# Patient Record
Sex: FEMALE | Race: WHITE | HISPANIC OR LATINO | Employment: FULL TIME | URBAN - METROPOLITAN AREA
[De-identification: names, ages, dates, MRNs, and addresses within clinical notes are randomized per-mention and may not be internally consistent; named-entity substitution may affect disease eponyms.]

---

## 2017-01-16 ENCOUNTER — TELEPHONE (OUTPATIENT)
Dept: MEDICAL GROUP | Facility: PHYSICIAN GROUP | Age: 27
End: 2017-01-16

## 2017-01-16 NOTE — TELEPHONE ENCOUNTER
----- Message from DEVANTE Vance sent at 1/15/2017  7:24 AM PST -----  Negative lab results.  F/u with Rosie as discussed.  Thank you

## 2017-02-09 ENCOUNTER — OFFICE VISIT (OUTPATIENT)
Dept: MEDICAL GROUP | Facility: PHYSICIAN GROUP | Age: 27
End: 2017-02-09
Payer: COMMERCIAL

## 2017-02-09 VITALS
HEART RATE: 96 BPM | DIASTOLIC BLOOD PRESSURE: 78 MMHG | BODY MASS INDEX: 29.27 KG/M2 | OXYGEN SATURATION: 97 % | RESPIRATION RATE: 14 BRPM | WEIGHT: 155 LBS | TEMPERATURE: 99.5 F | SYSTOLIC BLOOD PRESSURE: 114 MMHG | HEIGHT: 61 IN

## 2017-02-09 DIAGNOSIS — J01.10 ACUTE NON-RECURRENT FRONTAL SINUSITIS: ICD-10-CM

## 2017-02-09 PROCEDURE — 99214 OFFICE O/P EST MOD 30 MIN: CPT | Performed by: FAMILY MEDICINE

## 2017-02-09 RX ORDER — AMOXICILLIN AND CLAVULANATE POTASSIUM 875; 125 MG/1; MG/1
1 TABLET, FILM COATED ORAL 2 TIMES DAILY
Qty: 28 TAB | Refills: 0 | Status: SHIPPED | OUTPATIENT
Start: 2017-02-09 | End: 2017-02-23

## 2017-02-09 NOTE — MR AVS SNAPSHOT
"        Livier Nickerson   2017 1:00 PM   Office Visit   MRN: [id number]    Department:  [facility]   Dept Phone:  [phone]    Description:  Female : 1990   Provider:  Leatha Syed M.D.           Reason for Visit     Headache           Allergies as of 2017     Allergen Noted Reactions    Iodine 2015   Hives, Rash         Morphine 2015   Rash    \"Skin burny rash.\"      You were diagnosed with     Acute non-recurrent frontal sinusitis   [[id number]]         Vital Signs     Blood Pressure Pulse Temperature Respirations Height Weight    114/78 mmHg 96 37.5 °C (99.5 °F) 14 1.549 m (5' 0.98\") 70.308 kg (155 lb)    Body Mass Index Oxygen Saturation Last Menstrual Period Smoking Status          29.30 kg/m2 97% 2017 Never Smoker         Basic Information     Date Of Birth Sex Race Ethnicity Preferred Language    1990 Female White Non- English      Problem List              ICD-10-CM Priority Class Noted - Resolved    Palpitations R00.2   Unknown - Present    HYPOTENSION    Unknown - Present    Abdominal mass R19.00   9/10/2013 - Present    ASTHMA    9/10/2013 - Present    Colon polyps K63.5   9/10/2013 - Present    Headache R51 High  2015 - Present    Behcet's disease (CMS-[facility]) M35.2   2015 - Present    Bacteremia R78.81 Medium  2015 - Present    Hypokalemia E87.6   2015 - Present    Diarrhea R19.7   2015 - Present    Vaginitis N76.0   2016 - Present    Encounter for female birth control Z30.019   2016 - Present    Screening for STDs (sexually transmitted diseases) Z11.3   2016 - Present      Health Maintenance        Date Due Completion Dates    IMM HEP B VACCINE (1 of 3 - Primary Series) 1990 ---    IMM HEP A VACCINE (1 of 2 - Standard Series) 1993 ---    IMM HPV VACCINE (1 of 3 - Female 3 Dose Series) 2003 ---    IMM VARICELLA (CHICKENPOX) VACCINE (1 of 2 - 2 Dose Adolescent Series) 2005 ---   " IMM DTaP/Tdap/Td Vaccine (1 - Tdap) 2/19/2011 ---    IMM INFLUENZA (1) 9/1/2016 1/30/2015, 10/31/2013    PAP SMEAR 7/31/2018 7/31/2015, 6/13/2014, 9/10/2009 (Prv Comp)    Override on 9/10/2009: Previously completed (normal)    COLONOSCOPY 2/16/2025 2/16/2015            Current Immunizations     Influenza TIV (IM) 10/31/2013    Influenza Vaccine Quad Inj (Pf) 1/30/2015 10:32 PM    Pneumococcal polysaccharide vaccine (PPSV-23) 1/30/2015 10:35 PM      Below and/or attached are the medications your provider expects you to take. Review all of your home medications and newly ordered medications with your provider and/or pharmacist. Follow medication instructions as directed by your provider and/or pharmacist. Please keep your medication list with you and share with your provider. Update the information when medications are discontinued, doses are changed, or new medications (including over-the-counter products) are added; and carry medication information at all times in the event of emergency situations     Allergies:  IODINE - Hives,Rash     MORPHINE - Rash               Medications  Valid as of: February 09, 2017 -  1:34 PM    Generic Name Brand Name Tablet Size Instructions for use    Acetaminophen (Tab) TYLENOL 500 MG Take 500-1,000 mg by mouth every 6 hours as needed for Moderate Pain.        Amoxicillin-Pot Clavulanate (Tab) AUGMENTIN 875-125 MG Take 1 Tab by mouth 2 times a day for 14 days.        Aspirin-Acetaminophen-Caffeine (Tab) EXCEDRIN 250-250-65 MG Take 1 Tab by mouth every 6 hours as needed for Headache.        Butoconazole Nitrate (1 Dose) (Cream) Butoconazole Nitrate (1 Dose) 2 % One applicator vaginally every bedtime x 6 days        Doxycycline Hyclate (Tab) VIBRAMYCIN 100 MG Take 1 Tab by mouth 2 times a day.        Escitalopram Oxalate (Tab) LEXAPRO 10 MG Take 1 Tab by mouth every day.        Fluconazole (Tab) DIFLUCAN 150 MG Take 1 tablet by mouth once.  Repeat in 72 hours.        Fluconazole (Tab)  DIFLUCAN 150 MG Take 1 Tab by mouth every day.        Fluconazole (Tab) DIFLUCAN 150 MG 1 tab po as a single dose prn yeast infection.        Fluconazole (Tab) DIFLUCAN 150 MG Take 1 Tab by mouth 1 time daily as needed (; may repeat 2nd dose in 3-5d if sx persist).        Fluconazole (Tab) DIFLUCAN 150 MG Take 1 Tab by mouth every day. May repeat in 2 days        Fluconazole (Tab) DIFLUCAN 150 MG Take 1 Tab by mouth every day. Take one pill by mouth.  Repeat in 72 hours, then monthly for 6 months.        Ibuprofen (Tab) MOTRIN 800 MG Take 800 mg by mouth every 8 hours as needed for Moderate Pain or Headache.        Naproxen Sodium   Take  by mouth.        Norethin Ace-Eth Estrad-FE (Tab) Norethin Ace-Eth Estrad-FE 1-20 MG-MCG(24) Take 1 Each by mouth every day.        Norethin Ace-Eth Estrad-FE (Tab) Norethin Ace-Eth Estrad-FE 1-20 MG-MCG(24) Take 1 tablet by mouth every day.        Norethindrone (Tab) MICRONOR 0.35 MG Take 1 Tab by mouth every day. Begin on day 1 of menses.        Ondansetron (TABLET DISPERSIBLE) ZOFRAN ODT 4 MG Take 1 Tab by mouth every 8 hours as needed for Nausea/Vomiting.        Oseltamivir Phosphate (Cap) TAMIFLU 75 MG Take 1 Cap by mouth 2 times a day.        Terconazole (Cream) TERAZOL 0.4 % Insert 1 Applicator in vagina every bedtime.        .                 Medicines prescribed today were sent to:     [id number] - [place] - [address]    [address] [place] [address]    Phone: [phone] Fax: [phone]    Open 24 Hours?: No      Medication refill instructions:       If your prescription bottle indicates you have medication refills left, it is not necessary to call your provider’s office. Please contact your pharmacy and they will refill your medication.    If your prescription bottle indicates you do not have any refills left, you may request refills at any time through one of the following ways: The online [employer] system (except Urgent Care), by calling your  provider’s office, or by asking your pharmacy to contact your provider’s office with a refill request. Medication refills are processed only during regular business hours and may not be available until the next business day. Your provider may request additional information or to have a follow-up visit with you prior to refilling your medication.   *Please Note: Medication refills are assigned a new Rx number when refilled electronically. Your pharmacy may indicate that no refills were authorized even though a new prescription for the same medication is available at the pharmacy. Please request the medicine by name with the pharmacy before contacting your provider for a refill.           Digital Message Display Access Code: [email or web address]  Expires: 2/11/2017 11:49 AM    Digital Message Display  A secure, online tool to manage your health information     [employer]’s Digital Message Display® is a secure, online tool that connects you to your personalized health information from the privacy of your home -- day or night - making it very easy for you to manage your healthcare. Once the activation process is completed, you can even access your medical information using the Digital Message Display delta, which is available for free in the Apple Delta store or Google Play store.     Digital Message Display provides the following levels of access (as shown below):   My Chart Features   [facility] Doctor [facility]  Specialists [facility]  Urgent  Care Non-[facility]  Primary Care  Doctor   Email your healthcare team securely and privately 24/7 X X X    Manage appointments: schedule your next appointment; view details of past/upcoming appointments X      Request prescription refills. X      View recent personal medical records, including lab and immunizations X X X X   View health record, including health history, allergies, medications X X X X   Read reports about your outpatient visits, procedures, consult and ER notes X X X X   See your discharge summary, which is a recap of your hospital and/or ER  visit that includes your diagnosis, lab results, and care plan. X X       How to register for [employer]:  1. Go to  [email or web address].  2. Click on the Sign Up Now box, which takes you to the New Member Sign Up page. You will need to provide the following information:  a. Enter your [employer] Access Code exactly as it appears at the top of this page. (You will not need to use this code after you’ve completed the sign-up process. If you do not sign up before the expiration date, you must request a new code.)   b. Enter your date of birth.   c. Enter your home email address.   d. Click Submit, and follow the next screen’s instructions.  3. Create a [employer] ID. This will be your [employer] login ID and cannot be changed, so think of one that is secure and easy to remember.  4. Create a [employer] password. You can change your password at any time.  5. Enter your Password Reset Question and Answer. This can be used at a later time if you forget your password.   6. Enter your e-mail address. This allows you to receive e-mail notifications when new information is available in [employer].  7. Click Sign Up. You can now view your health information.    For assistance activating your [employer] account, call [phone]

## 2017-02-09 NOTE — PROGRESS NOTES
"Chief Complaint   Patient presents with   • Headache       HISTORY OF PRESENT ILLNESS: Patient is a 26 y.o. female established patient here today for the following concerns:    Here today with a couple months of headaches that she awakens with worsening throughout the day.  This seems to progress to \"migraine-like\" pain.  Also noted that she was getting some diplopia in the left eye.  New exposures include restarting OCPs.  Associated with nausea/no vomiting.  No fevers.  Has had chills.  Taking some ibuprofen with some mild relief.      Past Medical, Social, and Family history reviewed and updated in EPIC    Allergies:Iodine and Morphine    Current Outpatient Prescriptions   Medication Sig Dispense Refill   • ondansetron (ZOFRAN ODT) 4 MG TABLET DISPERSIBLE Take 1 Tab by mouth every 8 hours as needed for Nausea/Vomiting. 10 Tab 0   • Norethin Ace-Eth Estrad-FE (LOESTRIN 24 FE) 1-20 MG-MCG(24) Tab Take 1 tablet by mouth every day. 28 Tab 11   • terconazole (TERAZOL) 0.4 % Cream Insert 1 Applicator in vagina every bedtime. 100 g 2   • Naproxen Sodium (ALEVE PO) Take  by mouth.     • acetaminophen (TYLENOL) 500 MG TABS Take 500-1,000 mg by mouth every 6 hours as needed for Moderate Pain.     • ibuprofen (MOTRIN) 800 MG TABS Take 800 mg by mouth every 8 hours as needed for Moderate Pain or Headache.     • asa/apap/caffeine (EXCEDRIN) 250-250-65 MG TABS Take 1 Tab by mouth every 6 hours as needed for Headache.     • oseltamivir (TAMIFLU) 75 MG Cap Take 1 Cap by mouth 2 times a day. 10 Cap 0   • Butoconazole Nitrate, 1 Dose, 2 % Cream One applicator vaginally every bedtime x 6 days 5 g 1   • fluconazole (DIFLUCAN) 150 MG tablet Take 1 Tab by mouth every day. Take one pill by mouth.  Repeat in 72 hours, then monthly for 6 months. 7 Tab 0   • norethindrone (MICRONOR) 0.35 MG tablet Take 1 Tab by mouth every day. Begin on day 1 of menses. 28 Tab 11   • fluconazole (DIFLUCAN) 150 MG tablet Take 1 Tab by mouth every day. May " "repeat in 2 days 2 Tab 0   • doxycycline (VIBRAMYCIN) 100 MG Tab Take 1 Tab by mouth 2 times a day. 20 Tab 0   • fluconazole (DIFLUCAN) 150 MG tablet Take 1 Tab by mouth 1 time daily as needed (; may repeat 2nd dose in 3-5d if sx persist). 2 Tab 2   • escitalopram (LEXAPRO) 10 MG Tab Take 1 Tab by mouth every day. 30 Tab 3   • fluconazole (DIFLUCAN) 150 MG tablet 1 tab po as a single dose prn yeast infection. 1 Tab 1   • fluconazole (DIFLUCAN) 150 MG tablet Take 1 tablet by mouth once.  Repeat in 72 hours. 2 Tab 0   • fluconazole (DIFLUCAN) 150 MG tablet Take 1 Tab by mouth every day. 1 Tab 0   • Norethin Ace-Eth Estrad-FE 1-20 MG-MCG(24) TABS Take 1 Each by mouth every day. 84 Tab 3     No current facility-administered medications for this visit.         ROS:  Review of Systems   Constitutional: Negative for fever, chills, weight loss and malaise/fatigue.   HENT: Negative for ear pain, nosebleeds, congestion, sore throat and neck pain.    Eyes: Negative for blurred vision.   Respiratory: Negative for cough, sputum production, shortness of breath and wheezing.    Cardiovascular: Negative for chest pain, palpitations,  and leg swelling.   Gastrointestinal: Negative for heartburn, nausea, vomiting, diarrhea and abdominal pain.   Genitourinary: Negative for dysuria, urgency and frequency.   Musculoskeletal: Negative for myalgias, back pain and joint pain.   Skin: Negative for rash and itching.   Neurological: Negative for dizziness, tingling, tremors, sensory change, focal weakness and headaches.   Endo/Heme/Allergies: Does not bruise/bleed easily.   Psychiatric/Behavioral: Negative for depression, anxiety, suicidal ideas, insomnia and memory loss.      Exam:  Blood pressure 114/78, pulse 96, temperature 37.5 °C (99.5 °F), resp. rate 14, height 1.549 m (5' 0.98\"), weight 70.308 kg (155 lb), last menstrual period 02/03/2017, SpO2 97 %.    General:  Well nourished, well developed in NAD  Head is grossly normal.  HEENT: " TM clear bilaterally.  Significant PND. Nasal mucosa edematous with purulent nasal discharge.  Sinus tenderness on palpation.   Neck: Supple without JVD   Pulmonary:  Normal effort. CTAB  Cardiovascular: Regular rate and rhythm   Extremities: no clubbing, cyanosis, or edema.  Psych: affect appropriate      Please note that this dictation was created using voice recognition software. I have made every reasonable attempt to correct obvious errors, but I expect that there are errors of grammar and possibly content that I did not discover before finalizing the note.    Assessment/Plan:  1. Acute non-recurrent frontal sinusitis  Suspect sinusitis triggering migraines, likely with rebound headaches from NSAID use.  Will treat with augmentin x 14 days, if still persists may consider triptan therapy or d/c OCPs.   - amoxicillin-clavulanate (AUGMENTIN) 875-125 MG Tab; Take 1 Tab by mouth 2 times a day for 14 days.  Dispense: 28 Tab; Refill: 0

## 2017-03-21 ENCOUNTER — OFFICE VISIT (OUTPATIENT)
Dept: MEDICAL GROUP | Facility: PHYSICIAN GROUP | Age: 27
End: 2017-03-21
Payer: COMMERCIAL

## 2017-03-21 VITALS
WEIGHT: 156 LBS | DIASTOLIC BLOOD PRESSURE: 70 MMHG | RESPIRATION RATE: 16 BRPM | HEIGHT: 61 IN | HEART RATE: 96 BPM | SYSTOLIC BLOOD PRESSURE: 104 MMHG | TEMPERATURE: 99.4 F | BODY MASS INDEX: 29.45 KG/M2 | OXYGEN SATURATION: 98 %

## 2017-03-21 DIAGNOSIS — Z11.3 SCREENING FOR STDS (SEXUALLY TRANSMITTED DISEASES): ICD-10-CM

## 2017-03-21 DIAGNOSIS — R53.83 OTHER FATIGUE: ICD-10-CM

## 2017-03-21 DIAGNOSIS — Z11.3 ROUTINE SCREENING FOR STI (SEXUALLY TRANSMITTED INFECTION): ICD-10-CM

## 2017-03-21 PROCEDURE — 99214 OFFICE O/P EST MOD 30 MIN: CPT | Performed by: NURSE PRACTITIONER

## 2017-03-21 NOTE — ASSESSMENT & PLAN NOTE
Found out that her boyfriend had been sleeping with other girls.  Had STI's checked in January, but it had only been about couple weeks since exposure.  It has now been 3 months.  She has had a lot of anxiety about whether or not she waited long enough to test.  She has recently been feeling ill frequently, tired, and gaining weight.  Discussed plan to recheck labs for verification of negative results now that time has elapsed.

## 2017-03-21 NOTE — MR AVS SNAPSHOT
"        Livier Nickerson   3/21/2017 1:40 PM   Office Visit   MRN: [id number]    Department:  [facility]   Dept Phone:  [phone]    Description:  Female : 1990   Provider:  DEVANTE Vance           Reason for Visit     Nail Problem yellowish color     Exposure to STD           Allergies as of 3/21/2017     Allergen Noted Reactions    Iodine 2015   Hives, Rash         Morphine 2015   Rash    \"Skin burny rash.\"      You were diagnosed with     Other fatigue   [9733169]       Routine screening for STI (sexually transmitted infection)   [786944]         Vital Signs     Blood Pressure Pulse Temperature Respirations Height Weight    104/70 mmHg 96 37.4 °C (99.4 °F) 16 1.549 m (5' 0.98\") 70.761 kg (156 lb)    Body Mass Index Oxygen Saturation Last Menstrual Period Smoking Status          29.49 kg/m2 98% 2017 Never Smoker         Basic Information     Date Of Birth Sex Race Ethnicity Preferred Language    1990 Female White Non- English      Your appointments     Mar 24, 2017  9:30 AM   MR HEAD 60 with [facility]   [facility]    [address]  [place] [address]   [phone]            Mar 24, 2017 10:00 AM   MR ORBITS WITH/WITHOUT (60) with [facility] 1   [facility]    [address]  [place] [address]   [phone]              Problem List              ICD-10-CM Priority Class Noted - Resolved    Palpitations R00.2   Unknown - Present    HYPOTENSION    Unknown - Present    Abdominal mass R19.00   9/10/2013 - Present    ASTHMA    9/10/2013 - Present    Colon polyps K63.5   9/10/2013 - Present    Headache R51 High  2015 - Present    Behcet's disease (CMS-HCC) M35.2   2015 - Present    Bacteremia R78.81 Medium  2015 - Present    Hypokalemia E87.6   2015 - Present    Diarrhea R19.7   2015 - Present    Vaginitis N76.0   2016 - Present    Encounter for female birth control Z30.019   2016 " - Present    Screening for STDs (sexually transmitted diseases) Z11.3   12/11/2016 - Present      Health Maintenance        Date Due Completion Dates    IMM HEP B VACCINE (1 of 3 - Primary Series) 1990 ---    IMM HEP A VACCINE (1 of 2 - Standard Series) 2/19/1993 ---    IMM HPV VACCINE (1 of 3 - Female 3 Dose Series) 2/19/2003 ---    IMM VARICELLA (CHICKENPOX) VACCINE (1 of 2 - 2 Dose Adolescent Series) 2/19/2005 ---    IMM DTaP/Tdap/Td Vaccine (1 - Tdap) 2/19/2011 ---    IMM INFLUENZA (1) 9/1/2016 1/30/2015, 10/31/2013    PAP SMEAR 7/31/2018 7/31/2015, 6/13/2014, 9/10/2009 (Prv Comp)    Override on 9/10/2009: Previously completed (normal)    COLONOSCOPY 2/16/2025 2/16/2015            Current Immunizations     Influenza TIV (IM) 10/31/2013    Influenza Vaccine Quad Inj (Pf) 1/30/2015 10:32 PM    Pneumococcal polysaccharide vaccine (PPSV-23) 1/30/2015 10:35 PM      Below and/or attached are the medications your provider expects you to take. Review all of your home medications and newly ordered medications with your provider and/or pharmacist. Follow medication instructions as directed by your provider and/or pharmacist. Please keep your medication list with you and share with your provider. Update the information when medications are discontinued, doses are changed, or new medications (including over-the-counter products) are added; and carry medication information at all times in the event of emergency situations     Allergies:  IODINE - Hives,Rash     MORPHINE - Rash               Medications  Valid as of: March 21, 2017 -  2:36 PM    Generic Name Brand Name Tablet Size Instructions for use    Acetaminophen (Tab) TYLENOL 500 MG Take 500-1,000 mg by mouth every 6 hours as needed for Moderate Pain.        Aspirin-Acetaminophen-Caffeine (Tab) EXCEDRIN 250-250-65 MG Take 1 Tab by mouth every 6 hours as needed for Headache.        Butoconazole Nitrate (1 Dose) (Cream) Butoconazole Nitrate (1 Dose) 2 % One  applicator vaginally every bedtime x 6 days        Doxycycline Hyclate (Tab) VIBRAMYCIN 100 MG Take 1 Tab by mouth 2 times a day.        Escitalopram Oxalate (Tab) LEXAPRO 10 MG Take 1 Tab by mouth every day.        Fluconazole (Tab) DIFLUCAN 150 MG Take 1 tablet by mouth once.  Repeat in 72 hours.        Fluconazole (Tab) DIFLUCAN 150 MG Take 1 Tab by mouth every day.        Fluconazole (Tab) DIFLUCAN 150 MG 1 tab po as a single dose prn yeast infection.        Fluconazole (Tab) DIFLUCAN 150 MG Take 1 Tab by mouth 1 time daily as needed (; may repeat 2nd dose in 3-5d if sx persist).        Fluconazole (Tab) DIFLUCAN 150 MG Take 1 Tab by mouth every day. May repeat in 2 days        Fluconazole (Tab) DIFLUCAN 150 MG Take 1 Tab by mouth every day. Take one pill by mouth.  Repeat in 72 hours, then monthly for 6 months.        Ibuprofen (Tab) MOTRIN 800 MG Take 800 mg by mouth every 8 hours as needed for Moderate Pain or Headache.        Naproxen Sodium   Take  by mouth.        Norethin Ace-Eth Estrad-FE (Tab) Norethin Ace-Eth Estrad-FE 1-20 MG-MCG(24) Take 1 Each by mouth every day.        Norethin Ace-Eth Estrad-FE (Tab) Norethin Ace-Eth Estrad-FE 1-20 MG-MCG(24) Take 1 tablet by mouth every day.        Norethindrone (Tab) MICRONOR 0.35 MG Take 1 Tab by mouth every day. Begin on day 1 of menses.        Ondansetron (TABLET DISPERSIBLE) ZOFRAN ODT 4 MG Take 1 Tab by mouth every 8 hours as needed for Nausea/Vomiting.        Oseltamivir Phosphate (Cap) TAMIFLU 75 MG Take 1 Cap by mouth 2 times a day.        Terconazole (Cream) TERAZOL 0.4 % Insert 1 Applicator in vagina every bedtime.        .                 Medicines prescribed today were sent to:     [id number] - [place] - [address]    [address] [place] [address]    Phone: [phone] Fax: [phone]    Open 24 Hours?: No      Medication refill instructions:       If your prescription bottle indicates you have medication refills left, it is  not necessary to call your provider’s office. Please contact your pharmacy and they will refill your medication.    If your prescription bottle indicates you do not have any refills left, you may request refills at any time through one of the following ways: The online [employer] system (except Urgent Care), by calling your provider’s office, or by asking your pharmacy to contact your provider’s office with a refill request. Medication refills are processed only during regular business hours and may not be available until the next business day. Your provider may request additional information or to have a follow-up visit with you prior to refilling your medication.   *Please Note: Medication refills are assigned a new Rx number when refilled electronically. Your pharmacy may indicate that no refills were authorized even though a new prescription for the same medication is available at the pharmacy. Please request the medicine by name with the pharmacy before contacting your provider for a refill.        Your To Do List     Future Labs/Procedures Complete By Expires    CBC WITH DIFFERENTIAL  As directed 3/21/2018    COMP METABOLIC PANEL  As directed 3/21/2018    HEP B SURFACE ANTIGEN  As directed 3/21/2018    HEP C VIRUS ANTIBODY  As directed 3/21/2018    HIV RNA QUANT BY PCR  As directed 3/21/2018    T.PALLIDUM AB EIA  As directed 3/21/2018    TSH  As directed 3/21/2018    VITAMIN D,25 HYDROXY  As directed 3/21/2018         Z[employer]ix Access Code: [email or web address]  Expires: 4/10/2017 11:32 AM    [employer]ix  A secure, online tool to manage your health information     [employer] Zogenix® is a secure, online tool that connects you to your personalized health information from the privacy of your home -- day or night - making it very easy for you to manage your healthcare. Once the activation process is completed, you can even access your medical information using the Zogenix andrea, which is available for free in the Apple  Delta store or Google Play store.     In[employer]x provides the following levels of access (as shown below):   My Chart Features   [facility] Doctor [facility]  Specialists [facility]  Urgent  Care Non-[facility]  Primary Care  Doctor   Email your healthcare team securely and privately 24/7 X X X    Manage appointments: schedule your next appointment; view details of past/upcoming appointments X      Request prescription refills. X      View recent personal medical records, including lab and immunizations X X X X   View health record, including health history, allergies, medications X X X X   Read reports about your outpatient visits, procedures, consult and ER notes X X X X   See your discharge summary, which is a recap of your hospital and/or ER visit that includes your diagnosis, lab results, and care plan. X X       How to register for [employer]:  1. Go to  [email or web address].  2. Click on the Sign Up Now box, which takes you to the New Member Sign Up page. You will need to provide the following information:  a. Enter your [employer]x Access Code exactly as it appears at the top of this page. (You will not need to use this code after you’ve completed the sign-up process. If you do not sign up before the expiration date, you must request a new code.)   b. Enter your date of birth.   c. Enter your home email address.   d. Click Submit, and follow the next screen’s instructions.  3. Create a Inhibitex ID. This will be your Inhibitex login ID and cannot be changed, so think of one that is secure and easy to remember.  4. Create a Inhibitex password. You can change your password at any time.  5. Enter your Password Reset Question and Answer. This can be used at a later time if you forget your password.   6. Enter your e-mail address. This allows you to receive e-mail notifications when new information is available in [employer]itex.  7. Click Sign Up. You can now view your health information.    For assistance activating your [employer]  account, call [phone]

## 2017-03-21 NOTE — PROGRESS NOTES
Chief Complaint   Patient presents with   • Nail Problem     yellowish color    • Exposure to STD       HISTORY OF PRESENT ILLNESS: Patient is a 27 y.o. female established patient who presents today to discuss the following issues:    Screening for STDs (sexually transmitted diseases)  Found out that her boyfriend had been sleeping with other girls.  Had STI's checked in January, but it had only been about couple weeks since exposure.  It has now been 3 months.  She has had a lot of anxiety about whether or not she waited long enough to test.  She has recently been feeling ill frequently, tired, and gaining weight.  Discussed plan to recheck labs for verification of negative results now that time has elapsed.    Fatigue  Has been feeling very tired and rundown recently.  Will check routine labs as well.      Patient Active Problem List    Diagnosis Date Noted   • Headache 01/28/2015     Priority: High   • Bacteremia 01/29/2015     Priority: Medium   • Fatigue 03/21/2017   • Screening for STDs (sexually transmitted diseases) 12/11/2016   • Encounter for female birth control 12/08/2016   • Vaginitis 07/05/2016   • Behcet's disease (CMS-[facility]) 01/29/2015   • Hypokalemia 01/29/2015   • Diarrhea 01/29/2015   • Abdominal mass 09/10/2013   • ASTHMA 09/10/2013   • Colon polyps 09/10/2013   • Palpitations    • HYPOTENSION        Allergies:Iodine and Morphine    Current Outpatient Prescriptions   Medication Sig Dispense Refill   • Norethin Ace-Eth Estrad-FE (LOESTRIN 24 FE) 1-20 MG-MCG(24) Tab Take 1 tablet by mouth every day. 28 Tab 11   • Naproxen Sodium (ALEVE PO) Take  by mouth.     • oseltamivir (TAMIFLU) 75 MG Cap Take 1 Cap by mouth 2 times a day. 10 Cap 0   • ondansetron (ZOFRAN ODT) 4 MG TABLET DISPERSIBLE Take 1 Tab by mouth every 8 hours as needed for Nausea/Vomiting. 10 Tab 0   • Butoconazole Nitrate, 1 Dose, 2 % Cream One applicator vaginally every bedtime x 6 days 5 g 1   • fluconazole (DIFLUCAN) 150 MG tablet  Take 1 Tab by mouth every day. Take one pill by mouth.  Repeat in 72 hours, then monthly for 6 months. 7 Tab 0   • norethindrone (MICRONOR) 0.35 MG tablet Take 1 Tab by mouth every day. Begin on day 1 of menses. 28 Tab 11   • terconazole (TERAZOL) 0.4 % Cream Insert 1 Applicator in vagina every bedtime. 100 g 2   • fluconazole (DIFLUCAN) 150 MG tablet Take 1 Tab by mouth every day. May repeat in 2 days 2 Tab 0   • doxycycline (VIBRAMYCIN) 100 MG Tab Take 1 Tab by mouth 2 times a day. 20 Tab 0   • fluconazole (DIFLUCAN) 150 MG tablet Take 1 Tab by mouth 1 time daily as needed (; may repeat 2nd dose in 3-5d if sx persist). 2 Tab 2   • escitalopram (LEXAPRO) 10 MG Tab Take 1 Tab by mouth every day. 30 Tab 3   • fluconazole (DIFLUCAN) 150 MG tablet 1 tab po as a single dose prn yeast infection. 1 Tab 1   • fluconazole (DIFLUCAN) 150 MG tablet Take 1 tablet by mouth once.  Repeat in 72 hours. 2 Tab 0   • fluconazole (DIFLUCAN) 150 MG tablet Take 1 Tab by mouth every day. 1 Tab 0   • Norethin Ace-Eth Estrad-FE 1-20 MG-MCG(24) TABS Take 1 Each by mouth every day. 84 Tab 3   • acetaminophen (TYLENOL) 500 MG TABS Take 500-1,000 mg by mouth every 6 hours as needed for Moderate Pain.     • ibuprofen (MOTRIN) 800 MG TABS Take 800 mg by mouth every 8 hours as needed for Moderate Pain or Headache.     • asa/apap/caffeine (EXCEDRIN) 250-250-65 MG TABS Take 1 Tab by mouth every 6 hours as needed for Headache.       No current facility-administered medications for this visit.       Social History   Substance Use Topics   • Smoking status: Never Smoker    • Smokeless tobacco: Never Used   • Alcohol Use: 0.0 oz/week     0 Standard drinks or equivalent per week      Comment: rarely       Family Status   Relation Status Death Age   • Father Alive    • Mother Alive      Family History   Problem Relation Age of Onset   • Heart Disease Father    • Diabetes Maternal Grandmother    • Hypertension Maternal Grandmother    • Hypertension  "Maternal Grandfather    • Heart Disease Paternal Grandfather    • Thyroid Mother        Review of Systems:   Constitutional: Negative for fever, chills, and weight loss.  Positive for malaise/fatigue.   HENT: Negative for ear pain, nosebleeds, congestion, sore throat and neck pain.    Eyes: Negative for blurred vision.   Respiratory: Negative for cough, sputum production, shortness of breath and wheezing.    Cardiovascular: Negative for chest pain, palpitations, orthopnea and leg swelling.   Gastrointestinal: Negative for heartburn, nausea, vomiting and abdominal pain.   Genitourinary: Negative for dysuria, urgency and frequency.   Musculoskeletal: Negative for myalgias, joint pain, and back pain.  Skin: Negative for rash and itching.   Neurological: Negative for dizziness, tingling, tremors, sensory change, focal weakness and headaches.   Endo/Heme/Allergies: Does not bruise/bleed easily.   Psychiatric/Behavioral: Negative for depression, suicidal ideas and memory loss.  The patient is not nervous/anxious and does not have insomnia.    All other systems reviewed and are negative except as in HPI.    Exam:  Blood pressure 104/70, pulse 96, temperature 37.4 °C (99.4 °F), resp. rate 16, height 1.549 m (5' 0.98\"), weight 70.761 kg (156 lb), last menstrual period 02/24/2017, SpO2 98 %.  General:  Well nourished, well developed female in NAD  Head: Grossly normal.  Neck: Supple without JVD or bruit. Thyroid is not enlarged.  Pulmonary: Clear to ausculation. Normal effort. No rales, ronchi, or wheezing.  Cardiovascular: Regular rate and rhythm without murmur.   Extremities: No clubbing, cyanosis, or edema.  Skin: Intact with no obvious rashes or lesions.  Neuro: Grossly intact.  Psych: Alert and oriented x 3.  Mood and affect appropriate.    Medical decision-making and discussion: Livier is here today to discuss ordering lab work.  STI screening, as well as routine lab work was ordered.  She was encouraged to sign up " with Jamal, and she will follow-up here as needed.          Assessment/Plan:  1. Other fatigue  CBC WITH DIFFERENTIAL    COMP METABOLIC PANEL    TSH    VITAMIN D,25 HYDROXY   2. Routine screening for STI (sexually transmitted infection)  HEP B SURFACE ANTIGEN    HEP C VIRUS ANTIBODY    HIV RNA QUANT BY PCR    T.PALLIDUM AB EIA   3. Screening for STDs (sexually transmitted diseases)         Return if symptoms worsen or fail to improve.    Please note that this dictation was created using voice recognition software. I have made every reasonable attempt to correct obvious errors, but I expect that there are errors of grammar and possibly content that I did not discover before finalizing the note.

## 2017-03-24 ENCOUNTER — HOSPITAL ENCOUNTER (OUTPATIENT)
Dept: RADIOLOGY | Facility: MEDICAL CENTER | Age: 27
End: 2017-03-24
Attending: OPTOMETRIST
Payer: COMMERCIAL

## 2017-03-24 ENCOUNTER — HOSPITAL ENCOUNTER (OUTPATIENT)
Dept: RADIOLOGY | Facility: MEDICAL CENTER | Age: 27
End: 2017-03-24
Attending: FAMILY MEDICINE
Payer: COMMERCIAL

## 2017-03-24 DIAGNOSIS — H53.452 NASAL STEP VISUAL FIELD DEFECT OF LEFT EYE: ICD-10-CM

## 2017-03-24 DIAGNOSIS — H54.7: ICD-10-CM

## 2017-03-24 PROCEDURE — 70553 MRI BRAIN STEM W/O & W/DYE: CPT

## 2017-03-24 PROCEDURE — 70543 MRI ORBT/FAC/NCK W/O &W/DYE: CPT

## 2017-03-24 PROCEDURE — 700117 HCHG RX CONTRAST REV CODE 255: Performed by: OPTOMETRIST

## 2017-03-24 PROCEDURE — A9579 GAD-BASE MR CONTRAST NOS,1ML: HCPCS | Performed by: OPTOMETRIST

## 2017-03-24 RX ADMIN — GADODIAMIDE 15 ML: 287 INJECTION INTRAVENOUS at 10:44

## 2017-04-13 ENCOUNTER — TELEPHONE (OUTPATIENT)
Dept: MEDICAL GROUP | Facility: PHYSICIAN GROUP | Age: 27
End: 2017-04-13

## 2017-04-13 RX ORDER — ERGOCALCIFEROL 1.25 MG/1
CAPSULE ORAL
Qty: 12 CAP | Refills: 3 | Status: SHIPPED | OUTPATIENT
Start: 2017-04-13 | End: 2018-10-19

## 2017-04-13 NOTE — TELEPHONE ENCOUNTER
----- Message from DEVANTE Morin sent at 4/13/2017 11:43 AM PDT -----  Please call patient and let her know that her lab results were all normal!!  Her vitamin D was on the low side, so I have sent a prescription to her pharmacy.  Otherwise, they looked great!  She should plan to follow-up if she has any questions or concerns.    Thank you!  Wolfgang

## 2017-05-24 ENCOUNTER — OFFICE VISIT (OUTPATIENT)
Dept: MEDICAL GROUP | Facility: PHYSICIAN GROUP | Age: 27
End: 2017-05-24
Payer: COMMERCIAL

## 2017-05-24 VITALS
WEIGHT: 156 LBS | HEIGHT: 61 IN | HEART RATE: 70 BPM | RESPIRATION RATE: 16 BRPM | TEMPERATURE: 98.4 F | DIASTOLIC BLOOD PRESSURE: 70 MMHG | BODY MASS INDEX: 29.45 KG/M2 | SYSTOLIC BLOOD PRESSURE: 110 MMHG | OXYGEN SATURATION: 97 %

## 2017-05-24 DIAGNOSIS — L98.9 CHANGING SKIN LESION: ICD-10-CM

## 2017-05-24 DIAGNOSIS — N76.0 BV (BACTERIAL VAGINOSIS): ICD-10-CM

## 2017-05-24 DIAGNOSIS — B96.89 BV (BACTERIAL VAGINOSIS): ICD-10-CM

## 2017-05-24 PROCEDURE — 99214 OFFICE O/P EST MOD 30 MIN: CPT | Performed by: NURSE PRACTITIONER

## 2017-05-24 RX ORDER — METRONIDAZOLE 500 MG/1
500 TABLET ORAL 2 TIMES DAILY
Qty: 14 TAB | Refills: 0 | Status: SHIPPED | OUTPATIENT
Start: 2017-05-24 | End: 2017-06-20

## 2017-05-25 PROBLEM — B96.89 BV (BACTERIAL VAGINOSIS): Status: ACTIVE | Noted: 2017-05-25

## 2017-05-25 PROBLEM — N76.0 BV (BACTERIAL VAGINOSIS): Status: ACTIVE | Noted: 2017-05-25

## 2017-05-25 PROBLEM — L98.9 CHANGING SKIN LESION: Status: ACTIVE | Noted: 2017-05-25

## 2017-05-25 NOTE — PROGRESS NOTES
Chief Complaint   Patient presents with   • Nevus   • Vaginitis       HISTORY OF PRESENT ILLNESS: Patient is a 27 y.o. female established patient who presents today to discuss the following issues:    Changing skin lesion  Started out with a small mole on her right temple a few months ago.  Has been growing and changing.  Is now brown, raised, and rough.  Would like referral to derm for further evaluation.    BV (bacterial vaginosis)  Has recently noticed that she has thin vaginal discharge that smells like Windex.  Discussed plan.      Patient Active Problem List    Diagnosis Date Noted   • Headache 01/28/2015     Priority: High   • Bacteremia 01/29/2015     Priority: Medium   • Changing skin lesion 05/25/2017   • BV (bacterial vaginosis) 05/25/2017   • Fatigue 03/21/2017   • Screening for STDs (sexually transmitted diseases) 12/11/2016   • Encounter for female birth control 12/08/2016   • Behcet's disease (CMS-[facility]) 01/29/2015   • Hypokalemia 01/29/2015   • Diarrhea 01/29/2015   • Abdominal mass 09/10/2013   • ASTHMA 09/10/2013   • Colon polyps 09/10/2013   • Palpitations    • HYPOTENSION        Allergies:Iodine and Morphine    Current Outpatient Prescriptions   Medication Sig Dispense Refill   • metronidazole (FLAGYL) 500 MG Tab Take 1 Tab by mouth 2 Times a Day. 14 Tab 0   • Norethin Ace-Eth Estrad-FE (LOESTRIN 24 FE) 1-20 MG-MCG(24) Tab Take 1 tablet by mouth every day. 28 Tab 11   • ergocalciferol (DRISDOL) 74945 UNIT capsule Take 1 capsule by mouth once weekly. 12 Cap 3   • oseltamivir (TAMIFLU) 75 MG Cap Take 1 Cap by mouth 2 times a day. 10 Cap 0   • ondansetron (ZOFRAN ODT) 4 MG TABLET DISPERSIBLE Take 1 Tab by mouth every 8 hours as needed for Nausea/Vomiting. 10 Tab 0   • Butoconazole Nitrate, 1 Dose, 2 % Cream One applicator vaginally every bedtime x 6 days 5 g 1   • fluconazole (DIFLUCAN) 150 MG tablet Take 1 Tab by mouth every day. Take one pill by mouth.  Repeat in 72 hours, then monthly for 6 months.  7 Tab 0   • norethindrone (MICRONOR) 0.35 MG tablet Take 1 Tab by mouth every day. Begin on day 1 of menses. 28 Tab 11   • terconazole (TERAZOL) 0.4 % Cream Insert 1 Applicator in vagina every bedtime. 100 g 2   • fluconazole (DIFLUCAN) 150 MG tablet Take 1 Tab by mouth every day. May repeat in 2 days 2 Tab 0   • doxycycline (VIBRAMYCIN) 100 MG Tab Take 1 Tab by mouth 2 times a day. 20 Tab 0   • fluconazole (DIFLUCAN) 150 MG tablet Take 1 Tab by mouth 1 time daily as needed (; may repeat 2nd dose in 3-5d if sx persist). 2 Tab 2   • Naproxen Sodium (ALEVE PO) Take  by mouth.     • escitalopram (LEXAPRO) 10 MG Tab Take 1 Tab by mouth every day. 30 Tab 3   • fluconazole (DIFLUCAN) 150 MG tablet 1 tab po as a single dose prn yeast infection. 1 Tab 1   • fluconazole (DIFLUCAN) 150 MG tablet Take 1 tablet by mouth once.  Repeat in 72 hours. 2 Tab 0   • fluconazole (DIFLUCAN) 150 MG tablet Take 1 Tab by mouth every day. 1 Tab 0   • Norethin Ace-Eth Estrad-FE 1-20 MG-MCG(24) TABS Take 1 Each by mouth every day. 84 Tab 3   • acetaminophen (TYLENOL) 500 MG TABS Take 500-1,000 mg by mouth every 6 hours as needed for Moderate Pain.     • ibuprofen (MOTRIN) 800 MG TABS Take 800 mg by mouth every 8 hours as needed for Moderate Pain or Headache.     • asa/apap/caffeine (EXCEDRIN) 250-250-65 MG TABS Take 1 Tab by mouth every 6 hours as needed for Headache.       No current facility-administered medications for this visit.       Social History   Substance Use Topics   • Smoking status: Never Smoker    • Smokeless tobacco: Never Used   • Alcohol Use: 0.0 oz/week     0 Standard drinks or equivalent per week      Comment: rarely       Family Status   Relation Status Death Age   • Father Alive    • Mother Alive      Family History   Problem Relation Age of Onset   • Heart Disease Father    • Diabetes Maternal Grandmother    • Hypertension Maternal Grandmother    • Hypertension Maternal Grandfather    • Heart Disease Paternal Grandfather  "   • Thyroid Mother        Review of Systems:   Constitutional: Negative for fever, chills, weight loss and malaise/fatigue.   HENT: Negative for ear pain, nosebleeds, congestion, sore throat and neck pain.    Eyes: Negative for blurred vision.   Respiratory: Negative for cough, sputum production, shortness of breath and wheezing.    Cardiovascular: Negative for chest pain, palpitations, orthopnea and leg swelling.   Gastrointestinal: Negative for heartburn, nausea, vomiting and abdominal pain.   Genitourinary: Negative for dysuria, urgency and frequency. Positive for vaginal discharge that smells like \"windex\".  Musculoskeletal: Negative for myalgias, joint pain, and back pain.  Skin: Negative for rash and itching. Brown spot on right temple, changing.  Neurological: Negative for dizziness, tingling, tremors, sensory change, focal weakness and headaches.   Endo/Heme/Allergies: Does not bruise/bleed easily.   Psychiatric/Behavioral: Negative for depression, suicidal ideas and memory loss.  The patient is not nervous/anxious and does not have insomnia.    All other systems reviewed and are negative except as in HPI.    Exam:  Blood pressure 110/70, pulse 70, temperature 36.9 °C (98.4 °F), resp. rate 16, height 1.549 m (5' 1\"), weight 70.761 kg (156 lb), last menstrual period 05/14/2017, SpO2 97 %.  General:  Well nourished, well developed female in NAD  Head: Grossly normal.  Pulmonary: Clear to ausculation. Normal effort. No rales, ronchi, or wheezing.  Cardiovascular: Regular rate and rhythm without murmur.   Extremities: No clubbing, cyanosis, or edema.  Skin: Intact with no obvious rashes or lesions.  Irritated brown macule, < 1 cm right temple.  Neuro: Grossly intact.  Psych: Alert and oriented x 3.  Mood and affect appropriate.    Medical decision-making and discussion: Flaca is here today to discuss a skin lesion and vaginal discharge.  She was given a prescription for flagyl, and she was instructed to " avoid alcohol.  A referral was sent to dermatology.  She will follow-up here as needed.          Assessment/Plan:  1. Changing skin lesion  REFERRAL TO DERMATOLOGY   2. BV (bacterial vaginosis)  metronidazole (FLAGYL) 500 MG Tab       Return if symptoms worsen or fail to improve.    Please note that this dictation was created using voice recognition software. I have made every reasonable attempt to correct obvious errors, but I expect that there are errors of grammar and possibly content that I did not discover before finalizing the note.

## 2017-05-25 NOTE — ASSESSMENT & PLAN NOTE
Has recently noticed that she has thin vaginal discharge that smells like Windex.  Discussed plan.

## 2017-05-25 NOTE — ASSESSMENT & PLAN NOTE
Started out with a small mole on her right temple a few months ago.  Has been growing and changing.  Is now brown, raised, and rough.  Would like referral to derm for further evaluation.

## 2017-06-20 ENCOUNTER — OFFICE VISIT (OUTPATIENT)
Dept: MEDICAL GROUP | Facility: PHYSICIAN GROUP | Age: 27
End: 2017-06-20
Payer: COMMERCIAL

## 2017-06-20 ENCOUNTER — HOSPITAL ENCOUNTER (OUTPATIENT)
Facility: MEDICAL CENTER | Age: 27
End: 2017-06-20
Attending: NURSE PRACTITIONER
Payer: COMMERCIAL

## 2017-06-20 VITALS
WEIGHT: 160 LBS | RESPIRATION RATE: 16 BRPM | SYSTOLIC BLOOD PRESSURE: 120 MMHG | DIASTOLIC BLOOD PRESSURE: 78 MMHG | HEART RATE: 110 BPM | TEMPERATURE: 98.9 F | HEIGHT: 61 IN | BODY MASS INDEX: 30.21 KG/M2 | OXYGEN SATURATION: 90 %

## 2017-06-20 DIAGNOSIS — N76.0 ACUTE VAGINITIS: ICD-10-CM

## 2017-06-20 DIAGNOSIS — Z11.3 ROUTINE SCREENING FOR STI (SEXUALLY TRANSMITTED INFECTION): ICD-10-CM

## 2017-06-20 DIAGNOSIS — R11.2 NAUSEA AND VOMITING, INTRACTABILITY OF VOMITING NOT SPECIFIED, UNSPECIFIED VOMITING TYPE: ICD-10-CM

## 2017-06-20 PROBLEM — B96.89 BV (BACTERIAL VAGINOSIS): Status: RESOLVED | Noted: 2017-05-25 | Resolved: 2017-06-20

## 2017-06-20 PROCEDURE — 87480 CANDIDA DNA DIR PROBE: CPT

## 2017-06-20 PROCEDURE — 87660 TRICHOMONAS VAGIN DIR PROBE: CPT

## 2017-06-20 PROCEDURE — 87510 GARDNER VAG DNA DIR PROBE: CPT

## 2017-06-20 PROCEDURE — 99214 OFFICE O/P EST MOD 30 MIN: CPT | Performed by: NURSE PRACTITIONER

## 2017-06-20 RX ORDER — ONDANSETRON 4 MG/1
4 TABLET, ORALLY DISINTEGRATING ORAL EVERY 8 HOURS PRN
Qty: 30 TAB | Refills: 0 | Status: SHIPPED | OUTPATIENT
Start: 2017-06-20 | End: 2018-10-19

## 2017-06-20 NOTE — MR AVS SNAPSHOT
"        Flaca Nickerson   2017 4:20 PM   Office Visit   MRN: [id number]    Department:  [facility]   Dept Phone:  [phone]    Description:  Female : 1990   Provider:  DEVANTE Morin           Reason for Visit     Follow-Up           Allergies as of 2017     Allergen Noted Reactions    Iodine 2015   Hives, Rash         Morphine 2015   Rash    \"Skin burny rash.\"      You were diagnosed with     Nausea and vomiting, intractability of vomiting not specified, unspecified vomiting type   [1039617]       Routine screening for STI (sexually transmitted infection)   [045788]       Acute vaginitis   [516106]         Vital Signs     Blood Pressure Pulse Temperature Respirations Height Weight    120/78 mmHg 110 37.2 °C (98.9 °F) 16 1.549 m (5' 0.98\") 72.576 kg (160 lb)    Body Mass Index Oxygen Saturation Last Menstrual Period Breastfeeding? Smoking Status       30.25 kg/m2 90% 2017 No Never Smoker        Basic Information     Date Of Birth Sex Race Ethnicity Preferred Language    1990 Female White Non- English      Your appointments     2017  9:00 AM   GYN Visit with Ana M Serrano M.D.   [facility] ([address], [address] [place] [id number] [phone]              Problem List              ICD-10-CM Priority Class Noted - Resolved    Palpitations R00.2   Unknown - Present    HYPOTENSION    Unknown - Present    Abdominal mass R19.00   9/10/2013 - Present    ASTHMA    9/10/2013 - Present    Colon polyps K63.5   9/10/2013 - Present    Headache R51 High  2015 - Present    Behcet's disease (CMS-HCC) M35.2   2015 - Present    Hypokalemia E87.6   2015 - Present    Screening for STDs (sexually transmitted diseases) Z11.3   2016 - Present    Fatigue R53.83   3/21/2017 - Present    Changing skin lesion L98.9   2017 - Present    Acute vaginitis N76.0   2017 - " Present    Nausea and vomiting R11.2   6/20/2017 - Present      Health Maintenance        Date Due Completion Dates    IMM HEP B VACCINE (1 of 3 - Primary Series) 1990 ---    IMM HEP A VACCINE (1 of 2 - Standard Series) 2/19/1993 ---    IMM HPV VACCINE (1 of 3 - Female 3 Dose Series) 2/19/2003 ---    IMM VARICELLA (CHICKENPOX) VACCINE (1 of 2 - 2 Dose Adolescent Series) 2/19/2005 ---    IMM DTaP/Tdap/Td Vaccine (1 - Tdap) 2/19/2011 ---    PAP SMEAR 7/31/2018 7/31/2015, 6/13/2014, 9/10/2009 (Prv Comp)    Override on 9/10/2009: Previously completed (normal)    COLONOSCOPY 2/16/2025 2/16/2015            Current Immunizations     Influenza TIV (IM) 10/31/2013    Influenza Vaccine Quad Inj (Pf) 1/30/2015 10:32 PM    Pneumococcal polysaccharide vaccine (PPSV-23) 1/30/2015 10:35 PM      Below and/or attached are the medications your provider expects you to take. Review all of your home medications and newly ordered medications with your provider and/or pharmacist. Follow medication instructions as directed by your provider and/or pharmacist. Please keep your medication list with you and share with your provider. Update the information when medications are discontinued, doses are changed, or new medications (including over-the-counter products) are added; and carry medication information at all times in the event of emergency situations     Allergies:  IODINE - Hives,Rash     MORPHINE - Rash               Medications  Valid as of: June 21, 2017 -  1:51 PM    Generic Name Brand Name Tablet Size Instructions for use    Ergocalciferol (Cap) DRISDOL 12384 UNIT Take 1 capsule by mouth once weekly.        Norethin Ace-Eth Estrad-FE (Tab) Norethin Ace-Eth Estrad-FE 1-20 MG-MCG(24) Take 1 tablet by mouth every day.        Ondansetron (TABLET DISPERSIBLE) ZOFRAN ODT 4 MG Take 1 Tab by mouth every 8 hours as needed for Nausea/Vomiting.        Pramoxine-HC (Cream) PROTOFOAM-HC 1-2.5 % Apply to affected area 3 times a day as  needed.        .                 Medicines prescribed today were sent to:     [id number] - [place], [place] - [address] [place] [address]    [address] [place] [address] [place] [address]    Phone: [phone] Fax: [phone]    Open 24 Hours?: No      Medication refill instructions:       If your prescription bottle indicates you have medication refills left, it is not necessary to call your provider’s office. Please contact your pharmacy and they will refill your medication.    If your prescription bottle indicates you do not have any refills left, you may request refills at any time through one of the following ways: The online [employer] system (except Urgent Care), by calling your provider’s office, or by asking your pharmacy to contact your provider’s office with a refill request. Medication refills are processed only during regular business hours and may not be available until the next business day. Your provider may request additional information or to have a follow-up visit with you prior to refilling your medication.   *Please Note: Medication refills are assigned a new Rx number when refilled electronically. Your pharmacy may indicate that no refills were authorized even though a new prescription for the same medication is available at the pharmacy. Please request the medicine by name with the pharmacy before contacting your provider for a refill.        Your To Do List     Future Labs/Procedures Complete By [facility]    VAGINAL PATHOGENS DNA PANEL  As directed 6/20/2018         [employer] Access Code: [email or web address]  Expires: 7/2/2017  4:17 AM    [employer]  A secure, online tool to manage your health information     [employer]’s [employer]® is a secure, online tool that connects you to your personalized health information from the privacy of your home -- day or night - making it very easy for you to manage your healthcare. Once the activation process is completed, you can even access your medical information using the [employer]  delta, which is available for free in the Apple Delta store or Google Play store.     [employer] provides the following levels of access (as shown below):   My Chart Features   [facility] Doctor [facility]  Specialists [facility]  Urgent  Care Non-[facility]  Primary Care  Doctor   Email your healthcare team securely and privately 24/7 X X X    Manage appointments: schedule your next appointment; view details of past/upcoming appointments X      Request prescription refills. X      View recent personal medical records, including lab and immunizations X X X X   View health record, including health history, allergies, medications X X X X   Read reports about your outpatient visits, procedures, consult and ER notes X X X X   See your discharge summary, which is a recap of your hospital and/or ER visit that includes your diagnosis, lab results, and care plan. X X       How to register for [employer]:  1. Go to  [email or web address].  2. Click on the Sign Up Now box, which takes you to the New Member Sign Up page. You will need to provide the following information:  a. Enter your [employer] Access Code exactly as it appears at the top of this page. (You will not need to use this code after you’ve completed the sign-up process. If you do not sign up before the expiration date, you must request a new code.)   b. Enter your date of birth.   c. Enter your home email address.   d. Click Submit, and follow the next screen’s instructions.  3. Create a [employer] ID. This will be your [employer] login ID and cannot be changed, so think of one that is secure and easy to remember.  4. Create a [employer] password. You can change your password at any time.  5. Enter your Password Reset Question and Answer. This can be used at a later time if you forget your password.   6. Enter your e-mail address. This allows you to receive e-mail notifications when new information is available in [employer].  7. Click Sign Up. You can now view your health  information.    For assistance activating your [employer] account, call [phone]

## 2017-06-21 DIAGNOSIS — Z11.3 ROUTINE SCREENING FOR STI (SEXUALLY TRANSMITTED INFECTION): ICD-10-CM

## 2017-06-21 LAB
CANDIDA DNA VAG QL PROBE+SIG AMP: POSITIVE
G VAGINALIS DNA VAG QL PROBE+SIG AMP: POSITIVE
T VAGINALIS DNA VAG QL PROBE+SIG AMP: NEGATIVE

## 2017-06-21 NOTE — PROGRESS NOTES
Chief Complaint   Patient presents with   • Follow-Up       HISTORY OF PRESENT ILLNESS: Patient is a 27 y.o. female established patient who presents today to discuss the following issues:    Acute vaginitis  Finished the antibiotics for BV and shortly after noticed a very yellow vaginal discharge.  It is thick mucus with no specific odor.  She has also developed significant swelling and discomfort around her labia.  Discussed plan.  She has an appointment with a new GYN on July 18th.    Nausea and vomiting  Started 48 hours ago with nausea, vomiting, and diarrhea.  Is trying to stay hydrated and let it run it's course.  I will rx zofran.      Patient Active Problem List    Diagnosis Date Noted   • Headache 01/28/2015     Priority: High   • Acute vaginitis 06/20/2017   • Nausea and vomiting 06/20/2017   • Changing skin lesion 05/25/2017   • Fatigue 03/21/2017   • Screening for STDs (sexually transmitted diseases) 12/11/2016   • Behcet's disease (CMS-[facility]) 01/29/2015   • Hypokalemia 01/29/2015   • Abdominal mass 09/10/2013   • ASTHMA 09/10/2013   • Colon polyps 09/10/2013   • Palpitations    • HYPOTENSION        Allergies:Iodine and Morphine    Current Outpatient Prescriptions   Medication Sig Dispense Refill   • ondansetron (ZOFRAN ODT) 4 MG TABLET DISPERSIBLE Take 1 Tab by mouth every 8 hours as needed for Nausea/Vomiting. 30 Tab 0   • pramoxine-hydrocortisone (PROTOFOAM-HC) cream Apply to affected area 3 times a day as needed. 1 Tube 3   • ergocalciferol (DRISDOL) 75355 UNIT capsule Take 1 capsule by mouth once weekly. 12 Cap 3   • Norethin Ace-Eth Estrad-FE (LOESTRIN 24 FE) 1-20 MG-MCG(24) Tab Take 1 tablet by mouth every day. 28 Tab 11     No current facility-administered medications for this visit.       Social History   Substance Use Topics   • Smoking status: Never Smoker    • Smokeless tobacco: Never Used   • Alcohol Use: 0.0 oz/week     0 Standard drinks or equivalent per week      Comment: rarely  "      Family Status   Relation Status Death Age   • Father Alive    • Mother Alive      Family History   Problem Relation Age of Onset   • Heart Disease Father    • Diabetes Maternal Grandmother    • Hypertension Maternal Grandmother    • Hypertension Maternal Grandfather    • Heart Disease Paternal Grandfather    • Thyroid Mother        Review of Systems:   Constitutional: Negative for fever, chills, weight loss and malaise/fatigue.   HENT: Negative for ear pain, nosebleeds, congestion, sore throat and neck pain.    Eyes: Negative for blurred vision.   Respiratory: Negative for cough, sputum production, shortness of breath and wheezing.    Cardiovascular: Negative for chest pain, palpitations, orthopnea and leg swelling.   Gastrointestinal: Positive for nausea, vomiting, and diarrhea.   Genitourinary: Negative for dysuria, urgency and frequency. Positive for yellow vaginal discharge and redness/swelling of labia.  Musculoskeletal: Negative for myalgias, joint pain, and back pain.  Skin: Negative for rash and itching.   Neurological: Negative for dizziness, tingling, tremors, sensory change, focal weakness and headaches.   Endo/Heme/Allergies: Does not bruise/bleed easily.   Psychiatric/Behavioral: Negative for depression, suicidal ideas and memory loss.  The patient is not nervous/anxious and does not have insomnia.    All other systems reviewed and are negative except as in HPI.    Exam:  Blood pressure 120/78, pulse 110, temperature 37.2 °C (98.9 °F), resp. rate 16, height 1.549 m (5' 0.98\"), weight 72.576 kg (160 lb), last menstrual period 06/09/2017, SpO2 90 %, not currently breastfeeding.  General:  Well nourished, well developed female in NAD  Head: Grossly normal.  Pulmonary: Clear to ausculation. Normal effort. No rales, ronchi, or wheezing.  Cardiovascular: Regular rate and rhythm without murmur.   Extremities: No clubbing, cyanosis, or edema.  Abdomen:  Soft, nontender, nondistended  GYN:  External-vulva " with mild erythema, no sores/lesions.  Mucous membranes with erythema and light yellow discharge.  No odor.  Swab collected.  Skin: Intact with no obvious rashes or lesions.  Neuro: Grossly intact.  Psych: Alert and oriented x 3.  Mood and affect appropriate.    Medical decision-making and discussion: Flaca is here today to discuss a few issues.  A vaginal pathogen swab was collected and sent to the lab, and she was given prescriptions for pramoxine/HC and Zofran.  She will keep the appointment with her gyn, and she will follow-up here as needed.          Assessment/Plan:  1. Nausea and vomiting, intractability of vomiting not specified, unspecified vomiting type  ondansetron (ZOFRAN ODT) 4 MG TABLET DISPERSIBLE   2. Routine screening for STI (sexually transmitted infection)  VAGINAL PATHOGENS DNA PANEL    CANCELED: VAGINAL PATHOGENS DNA PANEL   3. Acute vaginitis  pramoxine-hydrocortisone (PROTOFOAM-HC) cream       Return if symptoms worsen or fail to improve.    Please note that this dictation was created using voice recognition software. I have made every reasonable attempt to correct obvious errors, but I expect that there are errors of grammar and possibly content that I did not discover before finalizing the note.

## 2017-06-21 NOTE — ASSESSMENT & PLAN NOTE
Finished the antibiotics for BV and shortly after noticed a very yellow vaginal discharge.  It is thick mucus with no specific odor.  She has also developed significant swelling and discomfort around her labia.  Discussed plan.  She has an appointment with a new GYN on July 18th.

## 2017-06-21 NOTE — ASSESSMENT & PLAN NOTE
Started 48 hours ago with nausea, vomiting, and diarrhea.  Is trying to stay hydrated and let it run it's course.  I will rx zofran.

## 2017-06-22 ENCOUNTER — HOSPITAL ENCOUNTER (EMERGENCY)
Dept: HOSPITAL 8 - ED | Age: 27
Discharge: HOME | End: 2017-06-22
Payer: COMMERCIAL

## 2017-06-22 VITALS — DIASTOLIC BLOOD PRESSURE: 71 MMHG | SYSTOLIC BLOOD PRESSURE: 119 MMHG

## 2017-06-22 VITALS — WEIGHT: 161.6 LBS | BODY MASS INDEX: 30.51 KG/M2 | HEIGHT: 61 IN

## 2017-06-22 DIAGNOSIS — Z90.89: ICD-10-CM

## 2017-06-22 DIAGNOSIS — E86.9: ICD-10-CM

## 2017-06-22 DIAGNOSIS — N30.90: ICD-10-CM

## 2017-06-22 DIAGNOSIS — R19.7: ICD-10-CM

## 2017-06-22 DIAGNOSIS — K52.29: Primary | ICD-10-CM

## 2017-06-22 DIAGNOSIS — E86.0: ICD-10-CM

## 2017-06-22 DIAGNOSIS — R11.2: ICD-10-CM

## 2017-06-22 LAB
AST SERPL-CCNC: 17 U/L (ref 15–37)
BUN SERPL-MCNC: 12 MG/DL (ref 7–18)

## 2017-06-22 PROCEDURE — 87086 URINE CULTURE/COLONY COUNT: CPT

## 2017-06-22 PROCEDURE — 96374 THER/PROPH/DIAG INJ IV PUSH: CPT

## 2017-06-22 PROCEDURE — 96361 HYDRATE IV INFUSION ADD-ON: CPT

## 2017-06-22 PROCEDURE — 99285 EMERGENCY DEPT VISIT HI MDM: CPT

## 2017-06-22 PROCEDURE — 81001 URINALYSIS AUTO W/SCOPE: CPT

## 2017-06-22 PROCEDURE — 83690 ASSAY OF LIPASE: CPT

## 2017-06-22 PROCEDURE — 36415 COLL VENOUS BLD VENIPUNCTURE: CPT

## 2017-06-22 PROCEDURE — 85025 COMPLETE CBC W/AUTO DIFF WBC: CPT

## 2017-06-22 PROCEDURE — 80053 COMPREHEN METABOLIC PANEL: CPT

## 2017-06-22 PROCEDURE — 96375 TX/PRO/DX INJ NEW DRUG ADDON: CPT

## 2017-06-22 PROCEDURE — 84703 CHORIONIC GONADOTROPIN ASSAY: CPT

## 2017-06-22 PROCEDURE — 96372 THER/PROPH/DIAG INJ SC/IM: CPT

## 2017-06-23 ENCOUNTER — TELEPHONE (OUTPATIENT)
Dept: MEDICAL GROUP | Facility: PHYSICIAN GROUP | Age: 27
End: 2017-06-23

## 2017-06-23 ENCOUNTER — HOSPITAL ENCOUNTER (EMERGENCY)
Dept: HOSPITAL 8 - ED | Age: 27
LOS: 1 days | Discharge: HOME | End: 2017-06-24
Payer: COMMERCIAL

## 2017-06-23 VITALS — WEIGHT: 162.7 LBS | HEIGHT: 61 IN | BODY MASS INDEX: 30.72 KG/M2

## 2017-06-23 DIAGNOSIS — Z91.041: ICD-10-CM

## 2017-06-23 DIAGNOSIS — Z88.5: ICD-10-CM

## 2017-06-23 DIAGNOSIS — B96.89 BV (BACTERIAL VAGINOSIS): ICD-10-CM

## 2017-06-23 DIAGNOSIS — R07.89: Primary | ICD-10-CM

## 2017-06-23 DIAGNOSIS — N76.0 VULVOVAGINITIS: ICD-10-CM

## 2017-06-23 DIAGNOSIS — F41.1: ICD-10-CM

## 2017-06-23 DIAGNOSIS — Z88.8: ICD-10-CM

## 2017-06-23 DIAGNOSIS — N76.0 BV (BACTERIAL VAGINOSIS): ICD-10-CM

## 2017-06-23 LAB
AST SERPL-CCNC: 77 U/L (ref 15–37)
BUN SERPL-MCNC: 4 MG/DL (ref 7–18)
IS PT STATUS REG ER OR PRE ER?: YES

## 2017-06-23 PROCEDURE — S0028 INJECTION, FAMOTIDINE, 20 MG: HCPCS

## 2017-06-23 PROCEDURE — 81001 URINALYSIS AUTO W/SCOPE: CPT

## 2017-06-23 PROCEDURE — 71275 CT ANGIOGRAPHY CHEST: CPT

## 2017-06-23 PROCEDURE — 71010: CPT

## 2017-06-23 PROCEDURE — 99285 EMERGENCY DEPT VISIT HI MDM: CPT

## 2017-06-23 PROCEDURE — 36415 COLL VENOUS BLD VENIPUNCTURE: CPT

## 2017-06-23 PROCEDURE — 93005 ELECTROCARDIOGRAM TRACING: CPT

## 2017-06-23 PROCEDURE — 96375 TX/PRO/DX INJ NEW DRUG ADDON: CPT

## 2017-06-23 PROCEDURE — 96361 HYDRATE IV INFUSION ADD-ON: CPT

## 2017-06-23 PROCEDURE — 85025 COMPLETE CBC W/AUTO DIFF WBC: CPT

## 2017-06-23 PROCEDURE — 85379 FIBRIN DEGRADATION QUANT: CPT

## 2017-06-23 PROCEDURE — 80053 COMPREHEN METABOLIC PANEL: CPT

## 2017-06-23 PROCEDURE — 84484 ASSAY OF TROPONIN QUANT: CPT

## 2017-06-23 PROCEDURE — 76700 US EXAM ABDOM COMPLETE: CPT

## 2017-06-23 PROCEDURE — 87086 URINE CULTURE/COLONY COUNT: CPT

## 2017-06-23 PROCEDURE — 96374 THER/PROPH/DIAG INJ IV PUSH: CPT

## 2017-06-23 PROCEDURE — 83690 ASSAY OF LIPASE: CPT

## 2017-06-23 RX ORDER — METRONIDAZOLE 500 MG/1
2000 TABLET ORAL ONCE
Qty: 4 TAB | Refills: 1 | Status: SHIPPED | OUTPATIENT
Start: 2017-06-23 | End: 2017-06-23

## 2017-06-23 RX ORDER — FLUCONAZOLE 150 MG/1
150 TABLET ORAL DAILY
Qty: 2 TAB | Refills: 0 | Status: SHIPPED | OUTPATIENT
Start: 2017-06-23 | End: 2018-10-19

## 2017-06-23 NOTE — TELEPHONE ENCOUNTER
----- Message from Leatha Syed M.D. sent at 6/23/2017  8:16 AM PDT -----  Please let Margot know that she is positive for both BV and Candida - I am sending in flagyl and diflucan.

## 2017-06-24 VITALS — SYSTOLIC BLOOD PRESSURE: 110 MMHG | DIASTOLIC BLOOD PRESSURE: 64 MMHG

## 2017-07-11 ENCOUNTER — HOSPITAL ENCOUNTER (OUTPATIENT)
Dept: LAB | Facility: MEDICAL CENTER | Age: 27
End: 2017-07-11
Attending: FAMILY MEDICINE
Payer: COMMERCIAL

## 2017-07-11 ENCOUNTER — OFFICE VISIT (OUTPATIENT)
Dept: MEDICAL GROUP | Facility: PHYSICIAN GROUP | Age: 27
End: 2017-07-11
Payer: COMMERCIAL

## 2017-07-11 VITALS
BODY MASS INDEX: 31.15 KG/M2 | DIASTOLIC BLOOD PRESSURE: 82 MMHG | HEIGHT: 61 IN | SYSTOLIC BLOOD PRESSURE: 124 MMHG | HEART RATE: 66 BPM | OXYGEN SATURATION: 99 % | WEIGHT: 165 LBS | TEMPERATURE: 98 F | RESPIRATION RATE: 14 BRPM

## 2017-07-11 DIAGNOSIS — E66.9 OBESITY (BMI 30-39.9): ICD-10-CM

## 2017-07-11 DIAGNOSIS — J02.9 PHARYNGITIS, UNSPECIFIED ETIOLOGY: ICD-10-CM

## 2017-07-11 DIAGNOSIS — Z20.9 HISTORY OF EXPOSURE TO INFECTIOUS DISEASE: ICD-10-CM

## 2017-07-11 DIAGNOSIS — R63.5 WEIGHT GAIN: ICD-10-CM

## 2017-07-11 LAB
HIV 1+2 AB+HIV1 P24 AG SERPL QL IA: NON REACTIVE
INT CON NEG: NEGATIVE
INT CON POS: POSITIVE
S PYO AG THROAT QL: NEGATIVE
T3 SERPL-MCNC: 113.7 NG/DL (ref 60–181)
T4 FREE SERPL-MCNC: 0.87 NG/DL (ref 0.53–1.43)
THYROPEROXIDASE AB SERPL-ACNC: 1.4 IU/ML (ref 0–9)
TSH SERPL DL<=0.005 MIU/L-ACNC: 0.9 UIU/ML (ref 0.3–3.7)

## 2017-07-11 PROCEDURE — 36415 COLL VENOUS BLD VENIPUNCTURE: CPT

## 2017-07-11 PROCEDURE — 99214 OFFICE O/P EST MOD 30 MIN: CPT | Performed by: FAMILY MEDICINE

## 2017-07-11 PROCEDURE — 84480 ASSAY TRIIODOTHYRONINE (T3): CPT

## 2017-07-11 PROCEDURE — 87880 STREP A ASSAY W/OPTIC: CPT | Performed by: FAMILY MEDICINE

## 2017-07-11 PROCEDURE — 87389 HIV-1 AG W/HIV-1&-2 AB AG IA: CPT

## 2017-07-11 PROCEDURE — 86376 MICROSOMAL ANTIBODY EACH: CPT

## 2017-07-11 PROCEDURE — 84443 ASSAY THYROID STIM HORMONE: CPT

## 2017-07-11 PROCEDURE — 84439 ASSAY OF FREE THYROXINE: CPT

## 2017-07-11 NOTE — PROGRESS NOTES
Chief Complaint   Patient presents with   • Pharyngitis     x 10 days       HISTORY OF PRESENT ILLNESS: Patient is a 27 y.o. female established patient here today for the following concerns:    1. Pharyngitis, unspecified etiology  Cherelle is here today with about 10 days of sore throat, worsening in the last 48 hours not associated with any cough, headache, sinus pressure, ear pain, fever, or chills.  She has had some PND.  Hurts to swallow.  Using over the counter pain relievers with some improvement.     2. Weight gain  3. Obesity (BMI 30-39.9)  Noted continued increasing weight.  In review has noted increasing TSH as well.   She has family hx of thyroid disease including thyroid cancer and hypothyroidism.  She reports she has noted excessive hair growth and changes in her menstural cycle included shortened cycle interval.     4. History of exposure to infectious disease  Was potential exposed to someone who had exposure to HIV.  She was tested 6 weeks post exposure and 3 months both negative.  Would appreciate an additional test.  No prodromal symptoms.       Past Medical, Social, and Family history reviewed and updated in EPIC    Allergies:Iodine and Morphine    Current Outpatient Prescriptions   Medication Sig Dispense Refill   • Norethin Ace-Eth Estrad-FE (LOESTRIN 24 FE) 1-20 MG-MCG(24) Tab Take 1 tablet by mouth every day. 28 Tab 11   • fluconazole (DIFLUCAN) 150 MG tablet Take 1 Tab by mouth every day. May repeat in 2 days 2 Tab 0   • ondansetron (ZOFRAN ODT) 4 MG TABLET DISPERSIBLE Take 1 Tab by mouth every 8 hours as needed for Nausea/Vomiting. 30 Tab 0   • pramoxine-hydrocortisone (PROTOFOAM-HC) cream Apply to affected area 3 times a day as needed. 1 Tube 3   • ergocalciferol (DRISDOL) 87512 UNIT capsule Take 1 capsule by mouth once weekly. 12 Cap 3     No current facility-administered medications for this visit.         ROS:  Review of Systems   Constitutional: Negative for fever, chills, weight loss and  "malaise/fatigue.   HENT: Negative for ear pain, nosebleeds, congestion, sore throat and neck pain.    Eyes: Negative for blurred vision.   Respiratory: Negative for cough, sputum production, shortness of breath and wheezing.    Cardiovascular: Negative for chest pain, palpitations,  and leg swelling.   Gastrointestinal: Negative for heartburn, nausea, vomiting, diarrhea and abdominal pain.   Genitourinary: Negative for dysuria, urgency and frequency.   Musculoskeletal: Negative for myalgias, back pain and joint pain.   Skin: Negative for rash and itching.   Neurological: Negative for dizziness, tingling, tremors, sensory change, focal weakness and headaches.   Endo/Heme/Allergies: Does not bruise/bleed easily.   Psychiatric/Behavioral: Negative for depression, anxiety, suicidal ideas, insomnia and memory loss.      Exam:  Blood pressure 124/82, pulse 66, temperature 36.7 °C (98 °F), resp. rate 14, height 1.549 m (5' 0.98\"), weight 74.844 kg (165 lb), last menstrual period 07/11/2017, SpO2 99 %.    General:  Well nourished, well developed in NAD  Head is grossly normal.  Neck: Supple without JVD   Pulmonary:  Normal effort.   Cardiovascular: Regular rate  Extremities: no clubbing, cyanosis, or edema.  Psych: affect appropriate      Please note that this dictation was created using voice recognition software. I have made every reasonable attempt to correct obvious errors, but I expect that there are errors of grammar and possibly content that I did not discover before finalizing the note.    Assessment/Plan:  1. Pharyngitis, unspecified etiology  Strep negative - suspect viral and environmental allergies  - TSH; Future  - FREE THYROXINE; Future  - THYROID PEROXIDASE  (TPO) AB; Future  - TRIIDOTHYRONINE; Future  - POCT Rapid Strep A    2. Weight gain  Check   - TSH; Future  - FREE THYROXINE; Future  - THYROID PEROXIDASE  (TPO) AB; Future  - TRIIDOTHYRONINE; Future    3. Obesity (BMI 30-39.9)  Counseled.   - Patient " identified as having weight management issue.  Appropriate orders and counseling given.    4. History of exposure to infectious disease  Check   - HIV ANTIBODIES; Future    Follow up pending findings or if not improving.

## 2017-07-12 ENCOUNTER — TELEPHONE (OUTPATIENT)
Dept: MEDICAL GROUP | Facility: PHYSICIAN GROUP | Age: 27
End: 2017-07-12

## 2017-07-12 NOTE — TELEPHONE ENCOUNTER
----- Message from Leatha Syed M.D. sent at 7/11/2017  8:08 PM PDT -----  Please let Cherelle know that her thyroid and HIV tests were normal.

## 2017-07-18 ENCOUNTER — GYNECOLOGY VISIT (OUTPATIENT)
Dept: OBGYN | Facility: CLINIC | Age: 27
End: 2017-07-18
Payer: COMMERCIAL

## 2017-07-18 ENCOUNTER — HOSPITAL ENCOUNTER (OUTPATIENT)
Facility: MEDICAL CENTER | Age: 27
End: 2017-07-18
Attending: OBSTETRICS & GYNECOLOGY
Payer: COMMERCIAL

## 2017-07-18 VITALS
DIASTOLIC BLOOD PRESSURE: 60 MMHG | WEIGHT: 162.6 LBS | HEIGHT: 61 IN | SYSTOLIC BLOOD PRESSURE: 100 MMHG | BODY MASS INDEX: 30.7 KG/M2

## 2017-07-18 DIAGNOSIS — Z12.4 SCREENING FOR MALIGNANT NEOPLASM OF CERVIX: ICD-10-CM

## 2017-07-18 DIAGNOSIS — Z01.419 ROUTINE GYNECOLOGICAL EXAMINATION: ICD-10-CM

## 2017-07-18 DIAGNOSIS — Z11.51 SCREENING FOR HUMAN PAPILLOMAVIRUS: ICD-10-CM

## 2017-07-18 DIAGNOSIS — Z11.3 SCREENING FOR VENEREAL DISEASE: ICD-10-CM

## 2017-07-18 PROCEDURE — 88175 CYTOPATH C/V AUTO FLUID REDO: CPT

## 2017-07-18 PROCEDURE — 87491 CHLMYD TRACH DNA AMP PROBE: CPT

## 2017-07-18 PROCEDURE — 99213 OFFICE O/P EST LOW 20 MIN: CPT | Performed by: OBSTETRICS & GYNECOLOGY

## 2017-07-18 PROCEDURE — 87591 N.GONORRHOEAE DNA AMP PROB: CPT

## 2017-07-18 ASSESSMENT — ENCOUNTER SYMPTOMS
DOUBLE VISION: 0
BLURRED VISION: 0
CHILLS: 0
COUGH: 0
DEPRESSION: 0
BRUISES/BLEEDS EASILY: 0
FEVER: 0
NAUSEA: 0
DIZZINESS: 0
HEARTBURN: 0
ABDOMINAL PAIN: 0
VOMITING: 0
HEADACHES: 0
MYALGIAS: 0

## 2017-07-18 NOTE — PROGRESS NOTES
Subjective:      Flaca Nickerson is a 27 y.o. female who presents with Other            Other  Pertinent negatives include no abdominal pain, chest pain, chills, coughing, fever, headaches, myalgias, nausea, rash or vomiting.    27-year-old  011 his last menstrual period is . Patient is here today for a number of reasons and has a number of different complaints. She reports regular monthly menstrual cycles that are every 28 days. She states however for the last 6 months she has noticed that her periods have become shorter lasting only 3-1/2 days, they used to be 4-5. She also states that she has a days worth of spotting and then no bleeding and then begins her period right after that. She also states she is complaining of abnormal her growth on the areola and believes she may have a hormone imbalance. She also states that she has a yeast infection prior to her menstrual cycle for days every month and then it usually goes away one day after her period goes away. She states this has been going on for a year and was taking Diflucan monthly but states that she does not feel as if the medication had any help with her yeast and so she discontinued it. She also uses Vagisil for the discomfort which provides her with good relief.    GYN history no history of abnormal Pap smears or sexually transmitted diseases    Review of Systems   Constitutional: Negative for fever and chills.   Eyes: Negative for blurred vision and double vision.   Respiratory: Negative for cough.    Cardiovascular: Negative for chest pain.   Gastrointestinal: Negative for heartburn, nausea, vomiting and abdominal pain.   Genitourinary: Negative for dysuria.   Musculoskeletal: Negative for myalgias.   Skin: Negative for rash.   Neurological: Negative for dizziness and headaches.   Endo/Heme/Allergies: Does not bruise/bleed easily.   Psychiatric/Behavioral: Negative for depression.          Objective:     /60 mmHg  Ht 1.549 m  "(5' 1\")  Wt 73.755 kg (162 lb 9.6 oz)  BMI 30.74 kg/m2  LMP 07/07/2017     Physical Exam   Constitutional: She is oriented to person, place, and time. She appears well-developed and well-nourished.   HENT:   Head: Normocephalic and atraumatic.   Neck: Normal range of motion. Neck supple.   Cardiovascular: Normal rate and regular rhythm.    Pulmonary/Chest: Effort normal.   Abdominal: Soft. Bowel sounds are normal.   Genitourinary: Rectal exam shows no external hemorrhoid. Pelvic exam was performed with patient supine. No labial fusion. There is no rash, tenderness, lesion or injury on the right labia. There is no rash, tenderness, lesion or injury on the left labia. Uterus is not deviated, not enlarged, not fixed and not tender. Cervix exhibits no motion tenderness, no discharge and no friability. Right adnexum displays no mass, no tenderness and no fullness. Left adnexum displays no mass, no tenderness and no fullness. No erythema, tenderness or bleeding in the vagina. No foreign body around the vagina. No signs of injury around the vagina. No vaginal discharge found.   Lymphadenopathy:        Right: No inguinal adenopathy present.        Left: No inguinal adenopathy present.   Neurological: She is alert and oriented to person, place, and time.   Skin: Skin is warm and dry.   Psychiatric: She has a normal mood and affect.   Nursing note and vitals reviewed.              Assessment/Plan:     1. Routine gynecological examination    - THINPREP RFLX HPV ASCUS W/CTNG; Future    2. Screening for malignant neoplasm of cervix    - THINPREP RFLX HPV ASCUS W/CTNG; Future    3. Screening for human papillomavirus    - THINPREP RFLX HPV ASCUS W/CTNG; Future    4. Screening for venereal disease    - THINPREP RFLX HPV ASCUS W/CTNG; Future      Discussed with patient that menstrual cycles seem completely normal especially given that she is on birth control pills, and also her growth on the areola is also a normal " finding  Discussed with patient that she should come back prior to her menses when she is having the symptoms related to this yeast infection and I can evaluate her at that time

## 2017-07-18 NOTE — MR AVS SNAPSHOT
"Flaca Nickerson   2017 9:00 AM   Gynecology Visit   MRN: [id number]    Department:  [facility]   Dept Phone:  [phone]    Description:  Female : 1990   Provider:  Ana M Serrano M.D.           Reason for Visit     Other hormonal imbalance      Allergies as of 2017     Allergen Noted Reactions    Iodine 2015   Hives, Rash         Morphine 2015   Rash    \"Skin burny rash.\"      You were diagnosed with     Routine gynecological examination   [V72.31.ICD-9-CM]       Screening for malignant neoplasm of cervix   [978661]       Screening for human papillomavirus   [385898]       Screening for venereal disease   [384333]         Vital Signs     Blood Pressure Height Weight Body Mass Index Last Menstrual Period Smoking Status    100/60 mmHg 1.549 m (5' 1\") 73.755 kg (162 lb 9.6 oz) 30.74 kg/m2 2017 Never Smoker       Basic Information     Date Of Birth Sex Race Ethnicity Preferred Language    1990 Female White Non- English      Problem List              ICD-10-CM Priority Class Noted - Resolved    Palpitations R00.2   Unknown - Present    HYPOTENSION    Unknown - Present    Abdominal mass R19.00   9/10/2013 - Present    ASTHMA    9/10/2013 - Present    Colon polyps K63.5   9/10/2013 - Present    Headache R51 High  2015 - Present    Behcet's disease (CMS-[facility]) M35.2   2015 - Present    Hypokalemia E87.6   2015 - Present    Screening for STDs (sexually transmitted diseases) Z11.3   2016 - Present    Fatigue R53.83   3/21/2017 - Present    Changing skin lesion L98.9   2017 - Present    Acute vaginitis N76.0   2017 - Present    Nausea and vomiting R11.2   2017 - Present    Obesity (BMI 30-39.9) E66.9   2017 - Present      Health Maintenance        Date Due Completion Dates    IMM HEP B VACCINE (1 of 3 - Primary Series) 1990 ---    IMM HEP A VACCINE (1 of 2 - Standard Series) 1993 ---    IMM HPV " VACCINE (1 of 3 - Female 3 Dose Series) 2/19/2003 ---    IMM VARICELLA (CHICKENPOX) VACCINE (1 of 2 - 2 Dose Adolescent Series) 2/19/2005 ---    IMM DTaP/Tdap/Td Vaccine (1 - Tdap) 2/19/2011 ---    IMM INFLUENZA (1) 9/1/2017 1/30/2015, 10/31/2013    PAP SMEAR 7/31/2018 7/31/2015, 6/13/2014, 9/10/2009 (Prv Comp)    Override on 9/10/2009: Previously completed (normal)    COLONOSCOPY 2/16/2025 2/16/2015            Current Immunizations     Influenza TIV (IM) 10/31/2013    Influenza Vaccine Quad Inj (Pf) 1/30/2015 10:32 PM    Pneumococcal polysaccharide vaccine (PPSV-23) 1/30/2015 10:35 PM      Below and/or attached are the medications your provider expects you to take. Review all of your home medications and newly ordered medications with your provider and/or pharmacist. Follow medication instructions as directed by your provider and/or pharmacist. Please keep your medication list with you and share with your provider. Update the information when medications are discontinued, doses are changed, or new medications (including over-the-counter products) are added; and carry medication information at all times in the event of emergency situations     Allergies:  IODINE - Hives,Rash     MORPHINE - Rash               Medications  Valid as of: July 18, 2017 -  9:38 AM    Generic Name Brand Name Tablet Size Instructions for use    Ergocalciferol (Cap) DRISDOL 25996 UNIT Take 1 capsule by mouth once weekly.        Fluconazole (Tab) DIFLUCAN 150 MG Take 1 Tab by mouth every day. May repeat in 2 days        Norethin Ace-Eth Estrad-FE (Tab) Norethin Ace-Eth Estrad-FE 1-20 MG-MCG(24) Take 1 tablet by mouth every day.        Ondansetron (TABLET DISPERSIBLE) ZOFRAN ODT 4 MG Take 1 Tab by mouth every 8 hours as needed for Nausea/Vomiting.        Pramoxine-HC (Cream) PROTOFOAM-HC 1-2.5 % Apply to affected area 3 times a day as needed.        .                 Medicines prescribed today were sent to:     [facility]Y #[id number] - [address]  - [address] [facility].    [address] [place] [address]    Phone: [phone] Fax: [phone]    Open 24 Hours?: No      Medication refill instructions:       If your prescription bottle indicates you have medication refills left, it is not necessary to call your provider’s office. Please contact your pharmacy and they will refill your medication.    If your prescription bottle indicates you do not have any refills left, you may request refills at any time through one of the following ways: The online Cumed system (except Urgent Care), by calling your provider’s office, or by asking your pharmacy to contact your provider’s office with a refill request. Medication refills are processed only during regular business hours and may not be available until the next business day. Your provider may request additional information or to have a follow-up visit with you prior to refilling your medication.   *Please Note: Medication refills are assigned a new Rx number when refilled electronically. Your pharmacy may indicate that no refills were authorized even though a new prescription for the same medication is available at the pharmacy. Please request the medicine by name with the pharmacy before contacting your provider for a refill.           Cumed Access Code: [email or web address]  Expires: 7/31/2017  4:14 AM    Cumed  A secure, online tool to manage your health information     [employer]’s Cumed® is a secure, online tool that connects you to your personalized health information from the privacy of your home -- day or night - making it very easy for you to manage your healthcare. Once the activation process is completed, you can even access your medical information using the Cumed delta, which is available for free in the Apple Delta store or Google Play store.     Cumed provides the following levels of access (as shown below):   My Chart Features   [facility] Primary Care Doctor [facility]  Specialists  [facility]  Urgent  Care [facility]  Primary Care  Doctor   Email your healthcare team securely and privately 24/7 X X X    Manage appointments: schedule your next appointment; view details of past/upcoming appointments X      Request prescription refills. X      View recent personal medical records, including lab and immunizations X X X X   View health record, including health history, allergies, medications X X X X   Read reports about your outpatient visits, procedures, consult and ER notes X X X X   See your discharge summary, which is a recap of your hospital and/or ER visit that includes your diagnosis, lab results, and care plan. X X       How to register for [employer]:  1. Go to  [email or web address].  2. Click on the Sign Up Now box, which takes you to the New Member Sign Up page. You will need to provide the following information:  a. Enter your Indotrading Access Code exactly as it appears at the top of this page. (You will not need to use this code after you’ve completed the sign-up process. If you do not sign up before the expiration date, you must request a new code.)   b. Enter your date of birth.   c. Enter your home email address.   d. Click Submit, and follow the next screen’s instructions.  3. Create a Indotrading ID. This will be your Indotrading login ID and cannot be changed, so think of one that is secure and easy to remember.  4. Create a Indotrading password. You can change your password at any time.  5. Enter your Password Reset Question and Answer. This can be used at a later time if you forget your password.   6. Enter your e-mail address. This allows you to receive e-mail notifications when new information is available in Indotrading.  7. Click Sign Up. You can now view your health information.    For assistance activating your Indotrading account, call [phone]

## 2017-07-20 LAB
C TRACH DNA GENITAL QL NAA+PROBE: NEGATIVE
CYTOLOGY REG CYTOL: NORMAL
N GONORRHOEA DNA GENITAL QL NAA+PROBE: NEGATIVE
SPECIMEN SOURCE: NORMAL

## 2017-07-21 ENCOUNTER — PATIENT MESSAGE (OUTPATIENT)
Dept: MEDICAL GROUP | Facility: PHYSICIAN GROUP | Age: 27
End: 2017-07-21

## 2017-07-21 DIAGNOSIS — L65.9 ALOPECIA: ICD-10-CM

## 2017-07-21 DIAGNOSIS — N92.1 MENORRHAGIA WITH IRREGULAR CYCLE: ICD-10-CM

## 2017-07-24 ENCOUNTER — HOSPITAL ENCOUNTER (OUTPATIENT)
Dept: LAB | Facility: MEDICAL CENTER | Age: 27
End: 2017-07-24
Attending: FAMILY MEDICINE
Payer: COMMERCIAL

## 2017-07-24 DIAGNOSIS — N92.1 MENORRHAGIA WITH IRREGULAR CYCLE: ICD-10-CM

## 2017-07-24 DIAGNOSIS — L65.9 ALOPECIA: ICD-10-CM

## 2017-07-24 LAB
DHEA-S SERPL-MCNC: 131.2 UG/DL (ref 98.8–340)
ESTRADIOL SERPL-MCNC: 66 PG/ML
FSH SERPL-ACNC: 4.9 MIU/ML
LH SERPL-ACNC: 7 IU/L
TESTOST SERPL-MCNC: 30 NG/DL (ref 9–75)

## 2017-07-24 PROCEDURE — 36415 COLL VENOUS BLD VENIPUNCTURE: CPT

## 2017-07-24 PROCEDURE — 84403 ASSAY OF TOTAL TESTOSTERONE: CPT

## 2017-07-24 PROCEDURE — 82670 ASSAY OF TOTAL ESTRADIOL: CPT

## 2017-07-24 PROCEDURE — 82627 DEHYDROEPIANDROSTERONE: CPT

## 2017-07-24 PROCEDURE — 83001 ASSAY OF GONADOTROPIN (FSH): CPT

## 2017-07-24 PROCEDURE — 83002 ASSAY OF GONADOTROPIN (LH): CPT

## 2017-08-15 ENCOUNTER — TELEPHONE (OUTPATIENT)
Dept: OBGYN | Facility: CLINIC | Age: 27
End: 2017-08-15

## 2017-08-15 NOTE — TELEPHONE ENCOUNTER
Patient called stated that she needs to come in as soon as possible for a swab. Per patient   told her to come in next time she has a yeast infection but would like to talk to medical assistant first please call back thank you.

## 2017-08-16 NOTE — TELEPHONE ENCOUNTER
Pt states she may have yeast infection and would like for Dr Serrano to do a swab test but Dr Serrano won't be in until Friday. Called patient back but no answer left message on voicemail.

## 2017-08-18 ENCOUNTER — HOSPITAL ENCOUNTER (OUTPATIENT)
Facility: MEDICAL CENTER | Age: 27
End: 2017-08-18
Attending: OBSTETRICS & GYNECOLOGY
Payer: COMMERCIAL

## 2017-08-18 ENCOUNTER — TELEPHONE (OUTPATIENT)
Dept: OBGYN | Facility: CLINIC | Age: 27
End: 2017-08-18

## 2017-08-18 ENCOUNTER — GYNECOLOGY VISIT (OUTPATIENT)
Dept: OBGYN | Facility: CLINIC | Age: 27
End: 2017-08-18
Payer: COMMERCIAL

## 2017-08-18 VITALS
SYSTOLIC BLOOD PRESSURE: 110 MMHG | BODY MASS INDEX: 29.45 KG/M2 | WEIGHT: 156 LBS | DIASTOLIC BLOOD PRESSURE: 62 MMHG | HEIGHT: 61 IN

## 2017-08-18 DIAGNOSIS — B37.31 YEAST VAGINITIS: ICD-10-CM

## 2017-08-18 DIAGNOSIS — N89.8 VAGINAL DISCHARGE: ICD-10-CM

## 2017-08-18 PROCEDURE — 87510 GARDNER VAG DNA DIR PROBE: CPT

## 2017-08-18 PROCEDURE — 87660 TRICHOMONAS VAGIN DIR PROBE: CPT

## 2017-08-18 PROCEDURE — 87480 CANDIDA DNA DIR PROBE: CPT

## 2017-08-18 PROCEDURE — 99214 OFFICE O/P EST MOD 30 MIN: CPT | Performed by: OBSTETRICS & GYNECOLOGY

## 2017-08-18 NOTE — PROGRESS NOTES
"27-year-old  011 who is here today complaining of a yeast infection. Patient states that over the last year she has had a yeast infection every time before she has her period. After she has for. The yeast infection goes away on its own. The patient was using Diflucan monthly but it did not seem to improve her symptoms and she discontinued it. Last month she states that she had a menstrual cycle with all having a yeast infection, however one week ago she began to have a symptoms associated with yeast. She complains of burning itching and discomfort mostly on the outside of the vagina. She also states that she has large amount of clumpy vaginal discharge. She denies abdominal pain nausea vomiting or fever    Past medical history is reviewed and updated  Past surgical history is reviewed and updated  Medications reviewed and updated  Allergies reviewed and updated  Social history reviewed and updated  Family history reviewed and updated      /62 mmHg  Ht 1.549 m (5' 1\")  Wt 70.761 kg (156 lb)  BMI 29.49 kg/m2  LMP 2017  Normal external female genitalia with a small amount of yellowish discharge on the external vulva, no evidence of excoriation or erythema  Vagina also appears normal with minimal discharge appreciated  Cervix is normal    Wet mount was performed and read by me shows fairly normal squamous cells with no Trichomonas occasional white blood cells and also minimal amount of yeast    Assessment and plan a 27-year-old female with symptomatic yeast infection  We will give Terazol due to better broad spectrum coverage  Vaginal pathogens culture has been ordered  Patient is on birth control pills and she may want to consider discontinuing pills for 3-4 months to see if yeast infections do not resolve      "

## 2017-08-18 NOTE — MR AVS SNAPSHOT
"Flaca Nickerson   2017 2:00 PM   Gynecology Visit   MRN: [id number]    Department:  [facility]   Dept Phone:  [phone]    Description:  Female : 1990   Provider:  Ana M Serrano M.D.           Allergies as of 2017     Allergen Noted Reactions    Iodine 2015   Hives, Rash         Morphine 2015   Rash    \"Skin burny rash.\"      You were diagnosed with     Yeast vaginitis   [355356]       Vaginal discharge   [2015]         Vital Signs     Blood Pressure Height Weight Body Mass Index Last Menstrual Period Smoking Status    110/62 mmHg 1.549 m (5' 1\") 70.761 kg (156 lb) 29.49 kg/m2 2017 Never Smoker       Basic Information     Date Of Birth Sex Race Ethnicity Preferred Language    1990 Female White Non- English      Problem List              ICD-10-CM Priority Class Noted - Resolved    Palpitations R00.2   Unknown - Present    HYPOTENSION    Unknown - Present    Abdominal mass R19.00   9/10/2013 - Present    ASTHMA    9/10/2013 - Present    Colon polyps K63.5   9/10/2013 - Present    Headache R51 High  2015 - Present    Behcet's disease (CMS-HCC) M35.2   2015 - Present    Hypokalemia E87.6   2015 - Present    Screening for STDs (sexually transmitted diseases) Z11.3   2016 - Present    Fatigue R53.83   3/21/2017 - Present    Changing skin lesion L98.9   2017 - Present    Acute vaginitis N76.0   2017 - Present    Nausea and vomiting R11.2   2017 - Present    Obesity (BMI 30-39.9) E66.9   2017 - Present      Health Maintenance        Date Due Completion Dates    IMM HEP B VACCINE (1 of 3 - Primary Series) 1992 ---    IMM HEP A VACCINE (1 of 2 - Standard Series) 1993 ---    IMM HPV VACCINE (1 of 3 - Female 3 Dose Series) 2003 ---    IMM VARICELLA (CHICKENPOX) VACCINE (1 of 2 - 2 Dose Adolescent Series) 2005 ---    IMM DTaP/Tdap/Td Vaccine (1 - Tdap) 2011 ---    IMM INFLUENZA " (1) 9/1/2017 1/30/2015, 10/31/2013    PAP SMEAR 7/18/2020 7/18/2017, 7/31/2015, 6/13/2014, 9/10/2009 (Prv Comp)    Override on 9/10/2009: Previously completed (normal)    COLONOSCOPY 2/16/2025 2/16/2015            Current Immunizations     Influenza TIV (IM) 10/31/2013    Influenza Vaccine Quad Inj (Pf) 1/30/2015 10:32 PM    Pneumococcal polysaccharide vaccine (PPSV-23) 1/30/2015 10:35 PM      Below and/or attached are the medications your provider expects you to take. Review all of your home medications and newly ordered medications with your provider and/or pharmacist. Follow medication instructions as directed by your provider and/or pharmacist. Please keep your medication list with you and share with your provider. Update the information when medications are discontinued, doses are changed, or new medications (including over-the-counter products) are added; and carry medication information at all times in the event of emergency situations     Allergies:  IODINE - Hives,Rash     MORPHINE - Rash               Medications  Valid as of: August 18, 2017 -  3:09 PM    Generic Name Brand Name Tablet Size Instructions for use    Ergocalciferol (Cap) DRISDOL 54225 UNIT Take 1 capsule by mouth once weekly.        Fluconazole (Tab) DIFLUCAN 150 MG Take 1 Tab by mouth every day. May repeat in 2 days        Norethin Ace-Eth Estrad-FE (Tab) Norethin Ace-Eth Estrad-FE 1-20 MG-MCG(24) Take 1 tablet by mouth every day.        Ondansetron (TABLET DISPERSIBLE) ZOFRAN ODT 4 MG Take 1 Tab by mouth every 8 hours as needed for Nausea/Vomiting.        Pramoxine-HC (Cream) PROTOFOAM-HC 1-2.5 % Apply to affected area 3 times a day as needed.        Terconazole (Cream) TERAZOL 0.4 % Insert 1 Applicator in vagina every bedtime. For seven days        .                 Medicines prescribed today were sent to:     [facility]ACY #[id number] - [place] [address] - [address]    [address] [place] [address]    Phone: [phone] Fax: [phone]      Open 24 Hours?: No      Medication refill instructions:       If your prescription bottle indicates you have medication refills left, it is not necessary to call your provider’s office. Please contact your pharmacy and they will refill your medication.    If your prescription bottle indicates you do not have any refills left, you may request refills at any time through one of the following ways: The online Outerstuff system (except Urgent Care), by calling your provider’s office, or by asking your pharmacy to contact your provider’s office with a refill request. Medication refills are processed only during regular business hours and may not be available until the next business day. Your provider may request additional information or to have a follow-up visit with you prior to refilling your medication.   *Please Note: Medication refills are assigned a new Rx number when refilled electronically. Your pharmacy may indicate that no refills were authorized even though a new prescription for the same medication is available at the pharmacy. Please request the medicine by name with the pharmacy before contacting your provider for a refill.        Your To Do List     Future Labs/Procedures Complete By Expires    VAGINAL PATHOGENS DNA PANEL  As directed 8/18/2018         Outerstuff Access Code: Activation code not generated  Current Outerstuff Status: Active

## 2017-08-18 NOTE — TELEPHONE ENCOUNTER
Per Dr Serrano, ok for patient to be fit in @ 2:00 today 8/18/17 due to infection patient has. Patient may have to wait due to other patients that are scheduled at this time. No answer, left message for patient to call back and let  know if she will be able to make it.

## 2017-08-19 DIAGNOSIS — N89.8 VAGINAL DISCHARGE: ICD-10-CM

## 2017-08-19 LAB
CANDIDA DNA VAG QL PROBE+SIG AMP: NEGATIVE
G VAGINALIS DNA VAG QL PROBE+SIG AMP: POSITIVE
T VAGINALIS DNA VAG QL PROBE+SIG AMP: NEGATIVE

## 2017-08-22 RX ORDER — METRONIDAZOLE 500 MG/1
500 TABLET ORAL 2 TIMES DAILY
Qty: 14 TAB | Refills: 0 | Status: SHIPPED | OUTPATIENT
Start: 2017-08-22 | End: 2018-10-19

## 2017-08-22 NOTE — TELEPHONE ENCOUNTER
----- Message from Ana M Serrano M.D. sent at 8/21/2017  9:05 AM PDT -----  Bacterial pathogens culture shows Gardnerella, please call out Flagyl 500 by mouth twice a day for 7 days

## 2017-08-23 ENCOUNTER — OFFICE VISIT (OUTPATIENT)
Dept: MEDICAL GROUP | Facility: PHYSICIAN GROUP | Age: 27
End: 2017-08-23
Payer: COMMERCIAL

## 2017-08-23 VITALS
BODY MASS INDEX: 28.89 KG/M2 | RESPIRATION RATE: 12 BRPM | SYSTOLIC BLOOD PRESSURE: 126 MMHG | DIASTOLIC BLOOD PRESSURE: 82 MMHG | OXYGEN SATURATION: 97 % | HEIGHT: 61 IN | TEMPERATURE: 97.7 F | WEIGHT: 153 LBS | HEART RATE: 104 BPM

## 2017-08-23 DIAGNOSIS — J06.9 UPPER RESPIRATORY TRACT INFECTION, UNSPECIFIED TYPE: ICD-10-CM

## 2017-08-23 DIAGNOSIS — R07.9 CHEST PAIN, UNSPECIFIED TYPE: ICD-10-CM

## 2017-08-23 DIAGNOSIS — R00.0 TACHYCARDIA: ICD-10-CM

## 2017-08-23 PROCEDURE — 99214 OFFICE O/P EST MOD 30 MIN: CPT | Performed by: FAMILY MEDICINE

## 2017-08-23 PROCEDURE — 93000 ELECTROCARDIOGRAM COMPLETE: CPT | Performed by: FAMILY MEDICINE

## 2017-08-23 NOTE — PROGRESS NOTES
Chief Complaint   Patient presents with   • Abnormal EKG       HISTORY OF PRESENT ILLNESS: Patient is a 27 y.o. female established patient here today for the following concerns:    1. Tachycardia  2. Chest pain, unspecified type  3. Upper respiratory tract infection, unspecified type  Mary is here today for follow-up on abnormal EKG performed at her job site after she began experiencing some palpitations dizziness and chest discomfort. She reports that she was febrile at that time with fevers up to 102-103 and experiencing cough congestion rhinorrhea with cold-like symptoms for the last 4 days. She reports that she did have some central chest pain and pain in her jaw. Her mom has history of SVT. She was advised to go the emergency room but made an appointment with us instead. She denies any shortness of breath, no leg swelling Pain or recent travel or immobilization. No previous history of blood clots personal or familial. She is on oral contraceptive therapy. Cough has been nonproductive in nature. Chest pain is worse with coughing.     EKG from her work demonstrates sinus tachycardia and possibly some flattening of T waves in the inferior leads otherwise normal    Past Medical, Social, and Family history reviewed and updated in EPIC    Allergies:Iodine and Morphine    Current Outpatient Prescriptions   Medication Sig Dispense Refill   • Norethin Ace-Eth Estrad-FE (LOESTRIN 24 FE) 1-20 MG-MCG(24) Tab Take 1 tablet by mouth every day. 28 Tab 11   • metronidazole (FLAGYL) 500 MG Tab Take 1 Tab by mouth 2 Times a Day. 14 Tab 0   • terconazole (TERAZOL 7) 0.4 % Cream Insert 1 Applicator in vagina every bedtime. For seven days 45 g 3   • fluconazole (DIFLUCAN) 150 MG tablet Take 1 Tab by mouth every day. May repeat in 2 days 2 Tab 0   • ondansetron (ZOFRAN ODT) 4 MG TABLET DISPERSIBLE Take 1 Tab by mouth every 8 hours as needed for Nausea/Vomiting. 30 Tab 0   • pramoxine-hydrocortisone (PROTOFOAM-HC) cream Apply to  "affected area 3 times a day as needed. 1 Tube 3   • ergocalciferol (DRISDOL) 77579 UNIT capsule Take 1 capsule by mouth once weekly. 12 Cap 3     No current facility-administered medications for this visit.         ROS:  Review of Systems   Constitutional: Negative for fever, chills, weight loss and malaise/fatigue.   HENT: Negative for ear pain, nosebleeds, congestion, sore throat and neck pain.    Eyes: Negative for blurred vision.   Respiratory: Negative for cough, sputum production, shortness of breath and wheezing.    Cardiovascular: Negative for chest pain, palpitations,  and leg swelling.   Gastrointestinal: Negative for heartburn, nausea, vomiting, diarrhea and abdominal pain.   Genitourinary: Negative for dysuria, urgency and frequency.   Musculoskeletal: Negative for myalgias, back pain and joint pain.   Skin: Negative for rash and itching.   Neurological: Negative for dizziness, tingling, tremors, sensory change, focal weakness and headaches.   Endo/Heme/Allergies: Does not bruise/bleed easily.   Psychiatric/Behavioral: Negative for depression, anxiety, suicidal ideas, insomnia and memory loss.      Exam:  Blood pressure 126/82, pulse 104, temperature 36.5 °C (97.7 °F), resp. rate 12, height 1.549 m (5' 0.98\"), weight 69.4 kg (153 lb), last menstrual period 08/07/2017, SpO2 97 %.    General:  Well nourished, well developed in NAD  Head is grossly normal.  Neck: Supple without JVD   Pulmonary:  Normal effort. Clear to auscultation bilaterally no wheezing rhonchi or rales  Cardiovascular: Regular rate and rhythm no murmurs, rubs or gallops  Extremities: no clubbing, cyanosis, or edema.  Psych: affect appropriate      Please note that this dictation was created using voice recognition software. I have made every reasonable attempt to correct obvious errors, but I expect that there are errors of grammar and possibly content that I did not discover before finalizing the note.    Assessment/Plan:  1. " Tachycardia  - EKG    2. Chest pain, unspecified type  - EKG    3. Upper respiratory tract infection, unspecified type    EKG Interpretation-HR is sinus tachycardia, rate of 100, borderline flattening of T waves in leads 2 and 3 no MO depression or ST elevation no evidence of ischemia     EKG is reassuring at this time, in the background of having a viral infection we suspected that of sinus tachycardia. Patient may use ibuprofen as needed for discomfort. Tylenol for fever. She has been cautioned that if she develops worsening chest pain, shortness of breath  Different symptoms that she should be evaluated in the emergency room. I have very low suspicion for pulmonary embolus. A no exam or EKG findings of pericarditis.

## 2017-08-23 NOTE — MR AVS SNAPSHOT
"Flaca Nickerson   2017 8:40 AM   Office Visit   MRN: [id number]    Department:  [facility]   Dept Phone:  [phone]    Description:  Female : 1990   Provider:  Leatha Syed M.D.           Reason for Visit     Abnormal EKG           Allergies as of 2017     Allergen Noted Reactions    Iodine 2015   Hives, Rash         Morphine 2015   Rash    \"Skin burny rash.\"      You were diagnosed with     Tachycardia   [309429]       Chest pain, unspecified type   [5926496]       Upper respiratory tract infection, unspecified type   [5232804]         Vital Signs     Blood Pressure Pulse Temperature Respirations Height Weight    126/82 mmHg 104 36.5 °C (97.7 °F) 12 1.549 m (5' 0.98\") 69.4 kg (153 lb)    Body Mass Index Oxygen Saturation Last Menstrual Period Smoking Status          28.92 kg/m2 97% 2017 Never Smoker         Basic Information     Date Of Birth Sex Race Ethnicity Preferred Language    1990 Female White Non- English      Problem List              ICD-10-CM Priority Class Noted - Resolved    Palpitations R00.2   Unknown - Present    HYPOTENSION    Unknown - Present    Abdominal mass R19.00   9/10/2013 - Present    ASTHMA    9/10/2013 - Present    Colon polyps K63.5   9/10/2013 - Present    Headache R51 High  2015 - Present    Behcet's disease (CMS-HCC) M35.2   2015 - Present    Hypokalemia E87.6   2015 - Present    Screening for STDs (sexually transmitted diseases) Z11.3   2016 - Present    Fatigue R53.83   3/21/2017 - Present    Changing skin lesion L98.9   2017 - Present    Acute vaginitis N76.0   2017 - Present    Nausea and vomiting R11.2   2017 - Present    Obesity (BMI 30-39.9) E66.9   2017 - Present      Health Maintenance        Date Due Completion Dates    IMM HEP B VACCINE (1 of 3 - Primary Series) 1990 ---    IMM HEP A VACCINE (1 of 2 - Standard Series) 1993 ---    IMM HPV " VACCINE (1 of 3 - Female 3 Dose Series) 2/19/2003 ---    IMM VARICELLA (CHICKENPOX) VACCINE (1 of 2 - 2 Dose Adolescent Series) 2/19/2005 ---    IMM DTaP/Tdap/Td Vaccine (1 - Tdap) 2/19/2011 ---    IMM INFLUENZA (1) 9/1/2017 1/30/2015, 10/31/2013    PAP SMEAR 7/18/2020 7/18/2017, 7/31/2015, 6/13/2014, 9/10/2009 (Prv Comp)    Override on 9/10/2009: Previously completed (normal)    COLONOSCOPY 2/16/2025 2/16/2015            Results     EKG                   Current Immunizations     Influenza TIV (IM) 10/31/2013    Influenza Vaccine Quad Inj (Pf) 1/30/2015 10:32 PM    Pneumococcal polysaccharide vaccine (PPSV-23) 1/30/2015 10:35 PM      Below and/or attached are the medications your provider expects you to take. Review all of your home medications and newly ordered medications with your provider and/or pharmacist. Follow medication instructions as directed by your provider and/or pharmacist. Please keep your medication list with you and share with your provider. Update the information when medications are discontinued, doses are changed, or new medications (including over-the-counter products) are added; and carry medication information at all times in the event of emergency situations     Allergies:  IODINE - Hives,Rash     MORPHINE - Rash               Medications  Valid as of: August 23, 2017 - 10:48 AM    Generic Name Brand Name Tablet Size Instructions for use    Ergocalciferol (Cap) DRISDOL 44980 UNIT Take 1 capsule by mouth once weekly.        Fluconazole (Tab) DIFLUCAN 150 MG Take 1 Tab by mouth every day. May repeat in 2 days        MetroNIDAZOLE (Tab) FLAGYL 500 MG Take 1 Tab by mouth 2 Times a Day.        Norethin Ace-Eth Estrad-FE (Tab) Norethin Ace-Eth Estrad-FE 1-20 MG-MCG(24) Take 1 tablet by mouth every day.        Ondansetron (TABLET DISPERSIBLE) ZOFRAN ODT 4 MG Take 1 Tab by mouth every 8 hours as needed for Nausea/Vomiting.        Pramoxine-HC (Cream) PROTOFOAM-HC 1-2.5 % Apply to affected area 3  times a day as needed.        Terconazole (Cream) TERAZOL 0.4 % Insert 1 Applicator in vagina every bedtime. For seven days        .                 Medicines prescribed today were sent to:     [id number] - [place] - [address]    [address] [place] [address]    Phone: [phone] Fax: [phone]    Open 24 Hours?: No      Medication refill instructions:       If your prescription bottle indicates you have medication refills left, it is not necessary to call your provider’s office. Please contact your pharmacy and they will refill your medication.    If your prescription bottle indicates you do not have any refills left, you may request refills at any time through one of the following ways: The online Amber[employer] system (except Urgent Care), by calling your provider’s office, or by asking your pharmacy to contact your provider’s office with a refill request. Medication refills are processed only during regular business hours and may not be available until the next business day. Your provider may request additional information or to have a follow-up visit with you prior to refilling your medication.   *Please Note: Medication refills are assigned a new Rx number when refilled electronically. Your pharmacy may indicate that no refills were authorized even though a new prescription for the same medication is available at the pharmacy. Please request the medicine by name with the pharmacy before contacting your provider for a refill.           AmberWave Access Code: Activation code not generated  Current AmberWave Status: Active

## 2018-01-31 DIAGNOSIS — Z30.019 ENCOUNTER FOR FEMALE BIRTH CONTROL: ICD-10-CM

## 2018-02-02 RX ORDER — NORETHINDRONE ACETATE AND ETHINYL ESTRADIOL AND FERROUS FUMARATE 1MG-20(24)
1 KIT ORAL DAILY
Qty: 84 TAB | Refills: 0 | Status: SHIPPED | OUTPATIENT
Start: 2018-02-02 | End: 2020-05-05

## 2018-07-11 ENCOUNTER — EMPLOYEE HEALTH (OUTPATIENT)
Dept: OCCUPATIONAL MEDICINE | Facility: CLINIC | Age: 28
End: 2018-07-11

## 2018-07-11 ENCOUNTER — HOSPITAL ENCOUNTER (OUTPATIENT)
Facility: MEDICAL CENTER | Age: 28
End: 2018-07-11
Attending: PREVENTIVE MEDICINE
Payer: COMMERCIAL

## 2018-07-11 ENCOUNTER — EH NON-PROVIDER (OUTPATIENT)
Dept: OCCUPATIONAL MEDICINE | Facility: CLINIC | Age: 28
End: 2018-07-11

## 2018-07-11 VITALS
WEIGHT: 152 LBS | OXYGEN SATURATION: 99 % | HEIGHT: 61 IN | HEART RATE: 78 BPM | RESPIRATION RATE: 14 BRPM | TEMPERATURE: 98 F | DIASTOLIC BLOOD PRESSURE: 80 MMHG | BODY MASS INDEX: 28.7 KG/M2 | SYSTOLIC BLOOD PRESSURE: 110 MMHG

## 2018-07-11 DIAGNOSIS — Z02.1 ENCOUNTER FOR PRE-EMPLOYMENT HEALTH SCREENING EXAMINATION: ICD-10-CM

## 2018-07-11 DIAGNOSIS — Z02.1 PRE-EMPLOYMENT DRUG SCREENING: ICD-10-CM

## 2018-07-11 DIAGNOSIS — Z02.1 PRE-EMPLOYMENT EXAMINATION: ICD-10-CM

## 2018-07-11 LAB
AMP AMPHETAMINE: NORMAL
BAR BARBITURATES: NORMAL
BZO BENZODIAZEPINES: NORMAL
COC COCAINE: NORMAL
INT CON NEG: NORMAL
INT CON POS: NORMAL
MDMA ECSTASY: NORMAL
MET METHAMPHETAMINES: NORMAL
MTD METHADONE: NORMAL
OPI OPIATES: NORMAL
OXY OXYCODONE: NORMAL
PCP PHENCYCLIDINE: NORMAL
POC URINE DRUG SCREEN OCDRS: NORMAL
THC: NORMAL

## 2018-07-11 PROCEDURE — 8915 PR COMPREHENSIVE PHYSICAL: Performed by: PREVENTIVE MEDICINE

## 2018-07-11 PROCEDURE — 86480 TB TEST CELL IMMUN MEASURE: CPT | Performed by: PREVENTIVE MEDICINE

## 2018-07-11 PROCEDURE — 80305 DRUG TEST PRSMV DIR OPT OBS: CPT | Performed by: PREVENTIVE MEDICINE

## 2018-07-11 NOTE — PROGRESS NOTES
Patient's body mass index is 28.72 kg/m². Exercise and nutrition counseling were performed at this visit.

## 2018-07-13 LAB
M TB TUBERC IFN-G BLD QL: NEGATIVE
M TB TUBERC IFN-G/MITOGEN IGNF BLD: 0
M TB TUBERC IGNF/MITOGEN IGNF CONTROL: 38.4 [IU]/ML
MITOGEN IGNF BCKGRD COR BLD-ACNC: 0.02 [IU]/ML

## 2018-07-16 ENCOUNTER — DOCUMENTATION (OUTPATIENT)
Dept: OCCUPATIONAL MEDICINE | Facility: CLINIC | Age: 28
End: 2018-07-16

## 2018-10-19 ENCOUNTER — OFFICE VISIT (OUTPATIENT)
Dept: MEDICAL GROUP | Facility: MEDICAL CENTER | Age: 28
End: 2018-10-19
Payer: COMMERCIAL

## 2018-10-19 VITALS
BODY MASS INDEX: 27.75 KG/M2 | DIASTOLIC BLOOD PRESSURE: 74 MMHG | SYSTOLIC BLOOD PRESSURE: 112 MMHG | HEART RATE: 89 BPM | OXYGEN SATURATION: 97 % | HEIGHT: 61 IN | RESPIRATION RATE: 16 BRPM | WEIGHT: 147 LBS

## 2018-10-19 DIAGNOSIS — K63.5 POLYP OF COLON, UNSPECIFIED PART OF COLON, UNSPECIFIED TYPE: ICD-10-CM

## 2018-10-19 DIAGNOSIS — Z11.3 SCREEN FOR STD (SEXUALLY TRANSMITTED DISEASE): ICD-10-CM

## 2018-10-19 DIAGNOSIS — R53.83 FATIGUE, UNSPECIFIED TYPE: ICD-10-CM

## 2018-10-19 PROBLEM — R11.2 NAUSEA AND VOMITING: Status: RESOLVED | Noted: 2017-06-20 | Resolved: 2018-10-19

## 2018-10-19 PROBLEM — E66.9 OBESITY (BMI 30-39.9): Status: RESOLVED | Noted: 2017-07-11 | Resolved: 2018-10-19

## 2018-10-19 PROBLEM — N76.0 ACUTE VAGINITIS: Status: RESOLVED | Noted: 2017-06-20 | Resolved: 2018-10-19

## 2018-10-19 PROBLEM — L98.9 CHANGING SKIN LESION: Status: RESOLVED | Noted: 2017-05-25 | Resolved: 2018-10-19

## 2018-10-19 PROCEDURE — 99213 OFFICE O/P EST LOW 20 MIN: CPT | Performed by: NURSE PRACTITIONER

## 2018-10-19 ASSESSMENT — PATIENT HEALTH QUESTIONNAIRE - PHQ9: CLINICAL INTERPRETATION OF PHQ2 SCORE: 0

## 2018-10-19 NOTE — PROGRESS NOTES
Subjective:      Flaca Nickerson is a 28 y.o. female who presents with Establish Care        CC: Patient is here today to establish care closer to her place of employment as well as for issues such as fatigue and STD screening.    HPI       1. Fatigue, unspecified type  Patient feels she has been fatigued more than usual over the past 2 months and would like to be worked up for this.  She states she does tend to have heavy periods and is concerned about anemia.  There was a diagnosis in her chart from 2017 for complaints of fatigue as well but her workup at that time was normal.  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    2. Screen for STD (sexually transmitted disease)  Patient reports she is getting back into a relationship and her partner would like to have her checked for STDs.  She states she has no problems with vaginal discharge, dysuria, dyspareunia or abdominal pain.    3. Polyp of colon, unspecified part of colon, unspecified type  Patient reports she has every 5-year follow-up for this and will be due for new testing next year.      Social History   Substance Use Topics   • Smoking status: Never Smoker   • Smokeless tobacco: Never Used   • Alcohol use 0.0 oz/week      Comment: rarely     Current Outpatient Prescriptions   Medication Sig Dispense Refill   • Norethin Ace-Eth Estrad-FE (LOESTRIN 24 FE) 1-20 MG-MCG(24) Tab Take 1 tablet by mouth every day. 84 Tab 0     No current facility-administered medications for this visit.      Past Medical History:   Diagnosis Date   • Abdominal mass 9/10/2013   • Adenomatous polyps     in colon   • ASTHMA     outgrown per mother   • Behcet's disease (HCC)    • Behcet's disease (HCC)    • Colon polyps 9/10/2013   • HYPOTENSION    • Migraine    • Mouth ulcer     abcess   • Palpitation    • Palpitations     SVT   • SVT (supraventricular tachycardia)  "([facility])    • Typhoid fever        Review of Systems   Constitutional: Positive for malaise/fatigue.   All other systems reviewed and are negative.         Objective:     /74 (BP Location: Right arm, Patient Position: Sitting, BP Cuff Size: Adult)   Pulse 89   Resp 16   Ht 1.549 m (5' 1\")   Wt 66.7 kg (147 lb)   SpO2 97%   BMI 27.78 kg/m²      Physical Exam   Constitutional: She is oriented to person, place, and time. She appears well-developed and well-nourished. No distress.   HENT:   Head: Normocephalic and atraumatic.   Right Ear: External ear normal.   Left Ear: External ear normal.   Nose: Nose normal.   Eyes: Right eye exhibits no discharge. Left eye exhibits no discharge.   Neck: Normal range of motion. Neck supple. No thyromegaly present.   Cardiovascular: Normal rate, regular rhythm and normal heart sounds.  Exam reveals no gallop and no friction rub.    No murmur heard.  Pulmonary/Chest: Effort normal and breath sounds normal. She has no wheezes. She has no rales.   Musculoskeletal: She exhibits no edema or tenderness.   Neurological: She is alert and oriented to person, place, and time. She displays normal reflexes.   Skin: Skin is warm and dry. No rash noted. She is not diaphoretic.   Psychiatric: She has a normal mood and affect. Her behavior is normal. Judgment and thought content normal.   Nursing note and vitals reviewed.              Assessment/Plan:     1. Fatigue, unspecified type  I suspect patient's symptoms are related to her stress of going to school, working and caring for an infant.  However, because of her menstrual history I will also check for anemia as well as check thyroid and for diabetes.  - COMP METABOLIC PANEL; Future  - CBC WITH DIFFERENTIAL; Future  - FE+TIBC+ROSS  - TSH; Future    2. Screen for STD (sexually transmitted disease)    - HIV ANTIBODIES; Future  - RPR    3. Polyp of colon, unspecified part of colon, unspecified type  Patient to follow-up with gastroenterology " when she is due for her 5-year colonoscopy.

## 2018-11-10 ENCOUNTER — HOSPITAL ENCOUNTER (OUTPATIENT)
Dept: LAB | Facility: MEDICAL CENTER | Age: 28
End: 2018-11-10
Attending: NURSE PRACTITIONER
Payer: COMMERCIAL

## 2018-11-10 DIAGNOSIS — R53.83 FATIGUE, UNSPECIFIED TYPE: ICD-10-CM

## 2018-11-10 DIAGNOSIS — Z11.3 SCREEN FOR STD (SEXUALLY TRANSMITTED DISEASE): ICD-10-CM

## 2018-11-10 LAB
ALBUMIN SERPL BCP-MCNC: 4.1 G/DL (ref 3.2–4.9)
ALBUMIN/GLOB SERPL: 1.2 G/DL
ALP SERPL-CCNC: 50 U/L (ref 30–99)
ALT SERPL-CCNC: 18 U/L (ref 2–50)
ANION GAP SERPL CALC-SCNC: 8 MMOL/L (ref 0–11.9)
AST SERPL-CCNC: 19 U/L (ref 12–45)
BASOPHILS # BLD AUTO: 0.4 % (ref 0–1.8)
BASOPHILS # BLD: 0.03 K/UL (ref 0–0.12)
BILIRUB SERPL-MCNC: 0.5 MG/DL (ref 0.1–1.5)
BUN SERPL-MCNC: 10 MG/DL (ref 8–22)
CALCIUM SERPL-MCNC: 9.5 MG/DL (ref 8.5–10.5)
CHLORIDE SERPL-SCNC: 102 MMOL/L (ref 96–112)
CO2 SERPL-SCNC: 26 MMOL/L (ref 20–33)
CREAT SERPL-MCNC: 0.6 MG/DL (ref 0.5–1.4)
EOSINOPHIL # BLD AUTO: 0.15 K/UL (ref 0–0.51)
EOSINOPHIL NFR BLD: 2.2 % (ref 0–6.9)
ERYTHROCYTE [DISTWIDTH] IN BLOOD BY AUTOMATED COUNT: 43.4 FL (ref 35.9–50)
FERRITIN SERPL-MCNC: 27.9 NG/ML (ref 10–291)
GLOBULIN SER CALC-MCNC: 3.3 G/DL (ref 1.9–3.5)
GLUCOSE SERPL-MCNC: 79 MG/DL (ref 65–99)
HCT VFR BLD AUTO: 40.1 % (ref 37–47)
HGB BLD-MCNC: 13.9 G/DL (ref 12–16)
HIV 1+2 AB+HIV1 P24 AG SERPL QL IA: NON REACTIVE
IMM GRANULOCYTES # BLD AUTO: 0.03 K/UL (ref 0–0.11)
IMM GRANULOCYTES NFR BLD AUTO: 0.4 % (ref 0–0.9)
IRON SATN MFR SERPL: 24 % (ref 15–55)
IRON SERPL-MCNC: 112 UG/DL (ref 40–170)
LYMPHOCYTES # BLD AUTO: 2.03 K/UL (ref 1–4.8)
LYMPHOCYTES NFR BLD: 29.9 % (ref 22–41)
MCH RBC QN AUTO: 31.2 PG (ref 27–33)
MCHC RBC AUTO-ENTMCNC: 34.7 G/DL (ref 33.6–35)
MCV RBC AUTO: 90.1 FL (ref 81.4–97.8)
MONOCYTES # BLD AUTO: 0.47 K/UL (ref 0–0.85)
MONOCYTES NFR BLD AUTO: 6.9 % (ref 0–13.4)
NEUTROPHILS # BLD AUTO: 4.07 K/UL (ref 2–7.15)
NEUTROPHILS NFR BLD: 60.2 % (ref 44–72)
NRBC # BLD AUTO: 0 K/UL
NRBC BLD-RTO: 0 /100 WBC
PLATELET # BLD AUTO: 346 K/UL (ref 164–446)
PMV BLD AUTO: 10 FL (ref 9–12.9)
POTASSIUM SERPL-SCNC: 3.7 MMOL/L (ref 3.6–5.5)
PROT SERPL-MCNC: 7.4 G/DL (ref 6–8.2)
RBC # BLD AUTO: 4.45 M/UL (ref 4.2–5.4)
SODIUM SERPL-SCNC: 136 MMOL/L (ref 135–145)
TIBC SERPL-MCNC: 472 UG/DL (ref 250–450)
TREPONEMA PALLIDUM IGG+IGM AB [PRESENCE] IN SERUM OR PLASMA BY IMMUNOASSAY: NON REACTIVE
TSH SERPL DL<=0.005 MIU/L-ACNC: 1.47 UIU/ML (ref 0.38–5.33)
WBC # BLD AUTO: 6.8 K/UL (ref 4.8–10.8)

## 2018-11-10 PROCEDURE — 86780 TREPONEMA PALLIDUM: CPT

## 2018-11-10 PROCEDURE — 80053 COMPREHEN METABOLIC PANEL: CPT

## 2018-11-10 PROCEDURE — 84443 ASSAY THYROID STIM HORMONE: CPT

## 2018-11-10 PROCEDURE — 36415 COLL VENOUS BLD VENIPUNCTURE: CPT

## 2018-11-10 PROCEDURE — 83540 ASSAY OF IRON: CPT

## 2018-11-10 PROCEDURE — 83550 IRON BINDING TEST: CPT

## 2018-11-10 PROCEDURE — 85025 COMPLETE CBC W/AUTO DIFF WBC: CPT

## 2018-11-10 PROCEDURE — 87389 HIV-1 AG W/HIV-1&-2 AB AG IA: CPT

## 2018-11-10 PROCEDURE — 82728 ASSAY OF FERRITIN: CPT

## 2019-01-03 ENCOUNTER — OFFICE VISIT (OUTPATIENT)
Dept: URGENT CARE | Facility: PHYSICIAN GROUP | Age: 29
End: 2019-01-03
Payer: COMMERCIAL

## 2019-01-03 ENCOUNTER — HOSPITAL ENCOUNTER (OUTPATIENT)
Facility: MEDICAL CENTER | Age: 29
End: 2019-01-03
Attending: FAMILY MEDICINE
Payer: COMMERCIAL

## 2019-01-03 VITALS
OXYGEN SATURATION: 100 % | DIASTOLIC BLOOD PRESSURE: 84 MMHG | HEIGHT: 61 IN | HEART RATE: 78 BPM | WEIGHT: 145 LBS | BODY MASS INDEX: 27.38 KG/M2 | SYSTOLIC BLOOD PRESSURE: 118 MMHG | TEMPERATURE: 98 F | RESPIRATION RATE: 16 BRPM

## 2019-01-03 DIAGNOSIS — Z11.3 ROUTINE SCREENING FOR STI (SEXUALLY TRANSMITTED INFECTION): ICD-10-CM

## 2019-01-03 DIAGNOSIS — N94.9 VAGINAL DISCOMFORT: ICD-10-CM

## 2019-01-03 LAB
APPEARANCE UR: NORMAL
BILIRUB UR STRIP-MCNC: NEGATIVE MG/DL
COLOR UR AUTO: YELLOW
GLUCOSE UR STRIP.AUTO-MCNC: NEGATIVE MG/DL
KETONES UR STRIP.AUTO-MCNC: NEGATIVE MG/DL
LEUKOCYTE ESTERASE UR QL STRIP.AUTO: NORMAL
NITRITE UR QL STRIP.AUTO: NEGATIVE
PH UR STRIP.AUTO: 5.5 [PH] (ref 5–8)
PROT UR QL STRIP: NEGATIVE MG/DL
RBC UR QL AUTO: NORMAL
SP GR UR STRIP.AUTO: 1.03
UROBILINOGEN UR STRIP-MCNC: 0.2 MG/DL

## 2019-01-03 PROCEDURE — 87660 TRICHOMONAS VAGIN DIR PROBE: CPT

## 2019-01-03 PROCEDURE — 81002 URINALYSIS NONAUTO W/O SCOPE: CPT | Performed by: FAMILY MEDICINE

## 2019-01-03 PROCEDURE — 87491 CHLMYD TRACH DNA AMP PROBE: CPT

## 2019-01-03 PROCEDURE — 99213 OFFICE O/P EST LOW 20 MIN: CPT | Performed by: FAMILY MEDICINE

## 2019-01-03 PROCEDURE — 87591 N.GONORRHOEAE DNA AMP PROB: CPT

## 2019-01-03 PROCEDURE — 87510 GARDNER VAG DNA DIR PROBE: CPT

## 2019-01-03 PROCEDURE — 87480 CANDIDA DNA DIR PROBE: CPT

## 2019-01-04 DIAGNOSIS — N94.9 VAGINAL DISCOMFORT: ICD-10-CM

## 2019-01-04 LAB
AMBIGUOUS DTTM AMBI4: NORMAL
C TRACH DNA SPEC QL NAA+PROBE: NEGATIVE
N GONORRHOEA DNA SPEC QL NAA+PROBE: NEGATIVE
SIGNIFICANT IND 70042: NORMAL
SITE SITE: NORMAL
SOURCE SOURCE: NORMAL
SPECIMEN SOURCE: NORMAL

## 2019-01-04 NOTE — PROGRESS NOTES
"Subjective:      Flaca Nickerson is a 28 y.o. female who presents with Vaginal Discharge (x3 days discomfort, bleeding, had unprotected sex 12/24)            This is a new problem.  Patient is here for evaluation of vaginal irritation and discomfort for the past few days she had unprotected sex on December 24.  She has had history of BV and yeast and infection in the past and reports that his symptoms are slightly different.  Denies any abdominal pain, nausea or vomiting, fever chills or back pain.  She just started menstruating today.  No history of STI's in the past        ROS       Objective:     /84 (BP Location: Right arm, Patient Position: Sitting, BP Cuff Size: Adult)   Pulse 78   Temp 36.7 °C (98 °F) (Temporal)   Resp 16   Ht 1.549 m (5' 1\")   Wt 65.8 kg (145 lb)   LMP 01/03/2019   SpO2 100%   BMI 27.40 kg/m²      Physical Exam   Constitutional: She is oriented to person, place, and time. She appears well-developed and well-nourished. No distress.   Cardiovascular: Normal rate.    Pulmonary/Chest: Effort normal. No respiratory distress.   Genitourinary:   Genitourinary Comments: Self collected vaginal swab   Neurological: She is alert and oriented to person, place, and time.   Skin: She is not diaphoretic.        UA shows trace of leukocytes and large amount of blood (patient is menstruating)      Assessment/Plan:     1. Vaginal discomfort  - POCT Urinalysis  - CHLAMYDIA/GC PCR URINE OR SWAB; Future  - VAGINAL PATHOGENS DNA PANEL; Future    Treatment pending vaginal pathology report and patient is comfortable with that      2. Routine screening for STI (sexually transmitted infection)  - RPR  - HIV AG/AB COMBO ASSAY SCREENING; Future  - HEP C VIRUS ANTIBODY; Future    Patient to come back for blood draw    "

## 2019-01-05 LAB
CANDIDA DNA VAG QL PROBE+SIG AMP: NEGATIVE
G VAGINALIS DNA VAG QL PROBE+SIG AMP: NEGATIVE
T VAGINALIS DNA VAG QL PROBE+SIG AMP: NEGATIVE

## 2019-01-20 ENCOUNTER — OFFICE VISIT (OUTPATIENT)
Dept: URGENT CARE | Facility: PHYSICIAN GROUP | Age: 29
End: 2019-01-20
Payer: COMMERCIAL

## 2019-01-20 ENCOUNTER — HOSPITAL ENCOUNTER (OUTPATIENT)
Facility: MEDICAL CENTER | Age: 29
End: 2019-01-20
Attending: NURSE PRACTITIONER
Payer: COMMERCIAL

## 2019-01-20 VITALS
HEART RATE: 69 BPM | OXYGEN SATURATION: 98 % | HEIGHT: 61 IN | RESPIRATION RATE: 16 BRPM | TEMPERATURE: 98.7 F | DIASTOLIC BLOOD PRESSURE: 70 MMHG | SYSTOLIC BLOOD PRESSURE: 98 MMHG | WEIGHT: 158 LBS | BODY MASS INDEX: 29.83 KG/M2

## 2019-01-20 DIAGNOSIS — N89.8 VAGINAL DISCHARGE: ICD-10-CM

## 2019-01-20 LAB
APPEARANCE UR: CLEAR
BILIRUB UR STRIP-MCNC: NEGATIVE MG/DL
COLOR UR AUTO: YELLOW
GLUCOSE UR STRIP.AUTO-MCNC: NEGATIVE MG/DL
KETONES UR STRIP.AUTO-MCNC: NEGATIVE MG/DL
LEUKOCYTE ESTERASE UR QL STRIP.AUTO: NORMAL
NITRITE UR QL STRIP.AUTO: NEGATIVE
PH UR STRIP.AUTO: 7 [PH] (ref 5–8)
PROT UR QL STRIP: NEGATIVE MG/DL
RBC UR QL AUTO: NEGATIVE
SP GR UR STRIP.AUTO: 1.02
UROBILINOGEN UR STRIP-MCNC: NORMAL MG/DL

## 2019-01-20 PROCEDURE — 99213 OFFICE O/P EST LOW 20 MIN: CPT | Performed by: NURSE PRACTITIONER

## 2019-01-20 PROCEDURE — 87480 CANDIDA DNA DIR PROBE: CPT

## 2019-01-20 PROCEDURE — 87491 CHLMYD TRACH DNA AMP PROBE: CPT

## 2019-01-20 PROCEDURE — 87591 N.GONORRHOEAE DNA AMP PROB: CPT

## 2019-01-20 PROCEDURE — 87510 GARDNER VAG DNA DIR PROBE: CPT

## 2019-01-20 PROCEDURE — 87660 TRICHOMONAS VAGIN DIR PROBE: CPT

## 2019-01-20 PROCEDURE — 81002 URINALYSIS NONAUTO W/O SCOPE: CPT | Performed by: NURSE PRACTITIONER

## 2019-01-20 ASSESSMENT — ENCOUNTER SYMPTOMS
NAUSEA: 0
HEADACHES: 0
VOMITING: 0
BACK PAIN: 0
DIARRHEA: 0
CHILLS: 0
CONSTIPATION: 0
DIZZINESS: 0
FEVER: 0
ABDOMINAL PAIN: 0
MYALGIAS: 0
WEAKNESS: 0
FLANK PAIN: 0

## 2019-01-20 NOTE — PROGRESS NOTES
"Subjective:      Flaca Nickerson is a 28 y.o. female who presents with Vaginal Itching (vaginal itching, burning with intercourse, oderous, white, clumpy discharge x 4 days)            HPI  C/o vaginal discharge. Was seen in UC for vaginal d/c and irritation 2 weeks ago. GC/Chlamydia/BV/yeast were negative. C/o thick white discharge, itchiness, redness from anterior vaginal region to anus. LMP was around 1/3/19. Denies fever, n/v/d, abdominal/back pain. Last gyne visit 8/2017 for yeast infection, last PAP 7/2017 which was \"normal\".     PMH:  has a past medical history of Abdominal mass (9/10/2013); Adenomatous polyps; ASTHMA; Behcet's disease (HCC); Behcet's disease (HCC); Colon polyps (9/10/2013); HYPOTENSION; Migraine; Mouth ulcer; Palpitation; Palpitations; SVT (supraventricular tachycardia) ([facility]); and Typhoid fever.  MEDS:   Current Outpatient Prescriptions:   •  Norethin Ace-Eth Estrad-FE (LOESTRIN 24 FE) 1-20 MG-MCG(24) Tab, Take 1 tablet by mouth every day., Disp: 84 Tab, Rfl: 0  ALLERGIES:   Allergies   Allergen Reactions   • Iodine Hives and Rash         • Morphine Rash     \"Skin burny rash.\"     SURGHX:   Past Surgical History:   Procedure Laterality Date   • TONSILLECTOMY  2004   • APPENDECTOMY       SOCHX:  reports that she has never smoked. She has never used smokeless tobacco. She reports that she drinks alcohol. She reports that she does not use drugs.  FH: Family history was reviewed, no pertinent findings to report    Review of Systems   Constitutional: Negative for chills, fever and malaise/fatigue.   Gastrointestinal: Negative for abdominal pain, constipation, diarrhea, nausea and vomiting.   Genitourinary: Negative for dysuria, flank pain, frequency, hematuria and urgency.   Musculoskeletal: Negative for back pain and myalgias.   Skin: Negative for itching and rash.   Neurological: Negative for dizziness, weakness and headaches.   All other systems reviewed and are negative.         " "Objective:     BP (!) 98/70 (BP Location: Left arm, Patient Position: Sitting, BP Cuff Size: Adult)   Pulse 69   Temp 37.1 °C (98.7 °F) (Temporal)   Resp 16   Ht 1.549 m (5' 1\")   Wt 71.7 kg (158 lb)   LMP 01/03/2019   SpO2 98%   BMI 29.85 kg/m²      Physical Exam   Constitutional: She is oriented to person, place, and time. Vital signs are normal. She appears well-developed and well-nourished. No distress.   HENT:   Head: Normocephalic.   Eyes: Pupils are equal, round, and reactive to light. Conjunctivae and EOM are normal.   Neck: Normal range of motion. Neck supple.   Cardiovascular: Normal rate and regular rhythm.    Pulmonary/Chest: Effort normal and breath sounds normal.   Abdominal: Soft. Bowel sounds are normal. She exhibits no distension. There is no tenderness. There is no rigidity, no rebound, no guarding and no CVA tenderness.   Genitourinary:   Genitourinary Comments: Patient defers exam.   Musculoskeletal: Normal range of motion.   Neurological: She is alert and oriented to person, place, and time.   Skin: Skin is warm and dry. She is not diaphoretic.   Vitals reviewed.            Patient would like testing for GC/Chlamydia/yeast/BV at this time before taking abx.  Patient would like to self swab at this time.  Assessment/Plan:     1. Vaginal discharge    - CHLAMYDIA/GC PCR URINE OR SWAB; Future  - VAGINAL PATHOGENS DNA PANEL; Future  - POCT Urinalysis    Increase water intake  Urinate more frequently and empty bladder completely  Practice good toileting hygiene after bowel movements and sexual intercourse, refrain from sexual intercourse until infection cleared  Monitor for back/flank pain, difficulty urinating, blood in urine- need re-evaluation      "

## 2019-01-21 ENCOUNTER — TELEPHONE (OUTPATIENT)
Dept: URGENT CARE | Facility: MEDICAL CENTER | Age: 29
End: 2019-01-21

## 2019-01-21 DIAGNOSIS — B37.31 VAGINAL YEAST INFECTION: ICD-10-CM

## 2019-01-21 LAB
C TRACH DNA SPEC QL NAA+PROBE: NEGATIVE
CANDIDA DNA VAG QL PROBE+SIG AMP: POSITIVE
G VAGINALIS DNA VAG QL PROBE+SIG AMP: NEGATIVE
N GONORRHOEA DNA SPEC QL NAA+PROBE: NEGATIVE
SPECIMEN SOURCE: NORMAL
T VAGINALIS DNA VAG QL PROBE+SIG AMP: NEGATIVE

## 2019-01-21 RX ORDER — FLUCONAZOLE 150 MG/1
150 TABLET ORAL ONCE
Qty: 2 TAB | Refills: 0 | Status: SHIPPED | OUTPATIENT
Start: 2019-01-21 | End: 2019-01-21

## 2019-01-22 ENCOUNTER — TELEPHONE (OUTPATIENT)
Dept: URGENT CARE | Facility: MEDICAL CENTER | Age: 29
End: 2019-01-22

## 2019-02-04 ENCOUNTER — HOSPITAL ENCOUNTER (OUTPATIENT)
Dept: RADIOLOGY | Facility: MEDICAL CENTER | Age: 29
End: 2019-02-04
Attending: EMERGENCY MEDICINE
Payer: COMMERCIAL

## 2019-02-04 ENCOUNTER — HOSPITAL ENCOUNTER (EMERGENCY)
Facility: MEDICAL CENTER | Age: 29
End: 2019-02-04
Attending: EMERGENCY MEDICINE
Payer: COMMERCIAL

## 2019-02-04 ENCOUNTER — OFFICE VISIT (OUTPATIENT)
Dept: URGENT CARE | Facility: PHYSICIAN GROUP | Age: 29
End: 2019-02-04
Payer: COMMERCIAL

## 2019-02-04 VITALS
HEART RATE: 63 BPM | WEIGHT: 160.05 LBS | TEMPERATURE: 97.3 F | DIASTOLIC BLOOD PRESSURE: 72 MMHG | HEIGHT: 61 IN | SYSTOLIC BLOOD PRESSURE: 116 MMHG | BODY MASS INDEX: 30.22 KG/M2 | RESPIRATION RATE: 22 BRPM | OXYGEN SATURATION: 98 %

## 2019-02-04 VITALS
HEART RATE: 73 BPM | BODY MASS INDEX: 30.58 KG/M2 | HEIGHT: 61 IN | DIASTOLIC BLOOD PRESSURE: 72 MMHG | WEIGHT: 162 LBS | TEMPERATURE: 96.9 F | OXYGEN SATURATION: 94 % | RESPIRATION RATE: 16 BRPM | SYSTOLIC BLOOD PRESSURE: 110 MMHG

## 2019-02-04 DIAGNOSIS — R07.81 PLEURITIC CHEST PAIN: ICD-10-CM

## 2019-02-04 DIAGNOSIS — R07.9 CHEST PAIN, UNSPECIFIED TYPE: ICD-10-CM

## 2019-02-04 LAB
ANION GAP SERPL CALC-SCNC: 9 MMOL/L (ref 0–11.9)
APTT PPP: 29.7 SEC (ref 24.7–36)
BASOPHILS # BLD AUTO: 0.6 % (ref 0–1.8)
BASOPHILS # BLD: 0.06 K/UL (ref 0–0.12)
BUN SERPL-MCNC: 15 MG/DL (ref 8–22)
CALCIUM SERPL-MCNC: 9 MG/DL (ref 8.4–10.2)
CHLORIDE SERPL-SCNC: 98 MMOL/L (ref 96–112)
CO2 SERPL-SCNC: 27 MMOL/L (ref 20–33)
CREAT SERPL-MCNC: 0.68 MG/DL (ref 0.5–1.4)
D DIMER PPP IA.FEU-MCNC: <0.4 UG/ML (FEU) (ref 0–0.5)
EKG IMPRESSION: NORMAL
EOSINOPHIL # BLD AUTO: 0.39 K/UL (ref 0–0.51)
EOSINOPHIL NFR BLD: 3.9 % (ref 0–6.9)
ERYTHROCYTE [DISTWIDTH] IN BLOOD BY AUTOMATED COUNT: 39.9 FL (ref 35.9–50)
GLUCOSE SERPL-MCNC: 89 MG/DL (ref 65–99)
HCT VFR BLD AUTO: 39.7 % (ref 37–47)
HGB BLD-MCNC: 13.3 G/DL (ref 12–16)
IMM GRANULOCYTES # BLD AUTO: 0.02 K/UL (ref 0–0.11)
IMM GRANULOCYTES NFR BLD AUTO: 0.2 % (ref 0–0.9)
INR PPP: 1.03 (ref 0.87–1.13)
LYMPHOCYTES # BLD AUTO: 4.14 K/UL (ref 1–4.8)
LYMPHOCYTES NFR BLD: 41.6 % (ref 22–41)
MCH RBC QN AUTO: 29.7 PG (ref 27–33)
MCHC RBC AUTO-ENTMCNC: 33.5 G/DL (ref 33.6–35)
MCV RBC AUTO: 88.6 FL (ref 81.4–97.8)
MONOCYTES # BLD AUTO: 0.76 K/UL (ref 0–0.85)
MONOCYTES NFR BLD AUTO: 7.6 % (ref 0–13.4)
NEUTROPHILS # BLD AUTO: 4.57 K/UL (ref 2–7.15)
NEUTROPHILS NFR BLD: 46.1 % (ref 44–72)
NRBC # BLD AUTO: 0 K/UL
NRBC BLD-RTO: 0 /100 WBC
PLATELET # BLD AUTO: 327 K/UL (ref 164–446)
PMV BLD AUTO: 9.6 FL (ref 9–12.9)
POTASSIUM SERPL-SCNC: 3.4 MMOL/L (ref 3.6–5.5)
PROTHROMBIN TIME: 13.4 SEC (ref 12–14.6)
RBC # BLD AUTO: 4.48 M/UL (ref 4.2–5.4)
SODIUM SERPL-SCNC: 134 MMOL/L (ref 135–145)
WBC # BLD AUTO: 9.9 K/UL (ref 4.8–10.8)

## 2019-02-04 PROCEDURE — 93005 ELECTROCARDIOGRAM TRACING: CPT | Performed by: EMERGENCY MEDICINE

## 2019-02-04 PROCEDURE — 700105 HCHG RX REV CODE 258: Performed by: EMERGENCY MEDICINE

## 2019-02-04 PROCEDURE — 85610 PROTHROMBIN TIME: CPT

## 2019-02-04 PROCEDURE — 71046 X-RAY EXAM CHEST 2 VIEWS: CPT

## 2019-02-04 PROCEDURE — 85730 THROMBOPLASTIN TIME PARTIAL: CPT

## 2019-02-04 PROCEDURE — 94760 N-INVAS EAR/PLS OXIMETRY 1: CPT

## 2019-02-04 PROCEDURE — A9270 NON-COVERED ITEM OR SERVICE: HCPCS | Performed by: EMERGENCY MEDICINE

## 2019-02-04 PROCEDURE — 85379 FIBRIN DEGRADATION QUANT: CPT

## 2019-02-04 PROCEDURE — 700111 HCHG RX REV CODE 636 W/ 250 OVERRIDE (IP): Performed by: EMERGENCY MEDICINE

## 2019-02-04 PROCEDURE — 36415 COLL VENOUS BLD VENIPUNCTURE: CPT

## 2019-02-04 PROCEDURE — 85025 COMPLETE CBC W/AUTO DIFF WBC: CPT

## 2019-02-04 PROCEDURE — 93000 ELECTROCARDIOGRAM COMPLETE: CPT | Performed by: EMERGENCY MEDICINE

## 2019-02-04 PROCEDURE — 99284 EMERGENCY DEPT VISIT MOD MDM: CPT

## 2019-02-04 PROCEDURE — 80048 BASIC METABOLIC PNL TOTAL CA: CPT

## 2019-02-04 PROCEDURE — 99203 OFFICE O/P NEW LOW 30 MIN: CPT | Performed by: EMERGENCY MEDICINE

## 2019-02-04 PROCEDURE — 700102 HCHG RX REV CODE 250 W/ 637 OVERRIDE(OP): Performed by: EMERGENCY MEDICINE

## 2019-02-04 RX ORDER — ASPIRIN 81 MG/1
324 TABLET, CHEWABLE ORAL ONCE
Status: COMPLETED | OUTPATIENT
Start: 2019-02-04 | End: 2019-02-04

## 2019-02-04 RX ORDER — SODIUM CHLORIDE 9 MG/ML
INJECTION, SOLUTION INTRAVENOUS CONTINUOUS
Status: DISCONTINUED | OUTPATIENT
Start: 2019-02-04 | End: 2019-02-05 | Stop reason: HOSPADM

## 2019-02-04 RX ORDER — ASPIRIN 600 MG/1
300 SUPPOSITORY RECTAL ONCE
Status: COMPLETED | OUTPATIENT
Start: 2019-02-04 | End: 2019-02-04

## 2019-02-04 RX ORDER — KETOROLAC TROMETHAMINE 30 MG/ML
30 INJECTION, SOLUTION INTRAMUSCULAR; INTRAVENOUS ONCE
Status: DISCONTINUED | OUTPATIENT
Start: 2019-02-04 | End: 2019-02-05 | Stop reason: HOSPADM

## 2019-02-04 RX ORDER — KETOROLAC TROMETHAMINE 10 MG/1
10 TABLET, FILM COATED ORAL 3 TIMES DAILY PRN
Qty: 15 TAB | Refills: 0 | Status: SHIPPED | OUTPATIENT
Start: 2019-02-04 | End: 2019-05-24

## 2019-02-04 RX ADMIN — SODIUM CHLORIDE: 9 INJECTION, SOLUTION INTRAVENOUS at 22:30

## 2019-02-04 RX ADMIN — ASPIRIN 81 MG CHEWABLE TABLET 324 MG: 81 TABLET CHEWABLE at 22:20

## 2019-02-04 ASSESSMENT — ENCOUNTER SYMPTOMS
SORE THROAT: 0
SYNCOPE: 0
HEARTBURN: 0
LEG PAIN: 0
LOWER EXTREMITY EDEMA: 0
PALPITATIONS: 0
NAUSEA: 0
HEMOPTYSIS: 0
WHEEZING: 0
ABDOMINAL PAIN: 0
SHORTNESS OF BREATH: 1
COUGH: 0
FEVER: 0

## 2019-02-05 NOTE — ED NOTES
D/c inst reviewed w/ the pt to include pain mangement and deep breathing as well as x 1 rx.  The pt verb understanding and denies questions.  The pt amb out of th ed w/o diff.

## 2019-02-05 NOTE — PROGRESS NOTES
"Subjective:      Flaca Nickerson is a 28 y.o. female who presents with Chest Pain (Chest pain radiating down Lt side of back, SOB  x1day)            Chest Pain    This is a new problem. The current episode started today. The onset quality is sudden. The problem occurs constantly. The problem has been unchanged. The pain is present in the substernal region (left). The quality of the pain is described as sharp. The pain radiates to the left shoulder and mid back. Associated symptoms include shortness of breath. Pertinent negatives include no abdominal pain, cough, fever, hemoptysis, leg pain, lower extremity edema, malaise/fatigue, nausea, palpitations or syncope. Treatments tried: TUMS. The treatment provided no relief. Risk factors include oral contraceptive use.       Review of Systems   Constitutional: Negative for fever and malaise/fatigue.   HENT: Negative for sore throat.    Respiratory: Positive for shortness of breath. Negative for cough, hemoptysis and wheezing.    Cardiovascular: Positive for chest pain. Negative for palpitations, leg swelling and syncope.   Gastrointestinal: Negative for abdominal pain, heartburn and nausea.   Skin: Negative for rash.     PMH:  has a past medical history of Abdominal mass (9/10/2013); Adenomatous polyps; ASTHMA; Behcet's disease (HCC); Behcet's disease (HCC); Colon polyps (9/10/2013); HYPOTENSION; Migraine; Mouth ulcer; Palpitation; Palpitations; SVT (supraventricular tachycardia) (HCC); and Typhoid fever.  MEDS:   Current Outpatient Prescriptions:   •  hyoscyamine-maalox plus-lidocaine viscous (GI COCKTAIL), Take 30 mL by mouth Once for 1 dose., Disp: 30 mL, Rfl: 0  •  Norethin Ace-Eth Estrad-FE (LOESTRIN 24 FE) 1-20 MG-MCG(24) Tab, Take 1 tablet by mouth every day., Disp: 84 Tab, Rfl: 0  ALLERGIES:   Allergies   Allergen Reactions   • Iodine Hives and Rash         • Morphine Rash     \"Skin burny rash.\"     SURGHX:   Past Surgical History:   Procedure " "Laterality Date   • TONSILLECTOMY  2004   • APPENDECTOMY       SOCHX:  reports that she has never smoked. She has never used smokeless tobacco. She reports that she drinks alcohol. She reports that she does not use drugs.  FH: family history includes Cancer (age of onset: 35) in her maternal aunt; Diabetes in her maternal grandmother; Heart Disease in her father and paternal grandfather; Hypertension in her maternal grandfather and maternal grandmother; Thyroid in her mother.       Objective:     /72   Pulse 73   Temp 36.1 °C (96.9 °F) (Temporal)   Resp 16   Ht 1.549 m (5' 1\")   Wt 73.5 kg (162 lb)   SpO2 94%   Breastfeeding? No   BMI 30.61 kg/m²      Physical Exam   Constitutional: She is oriented to person, place, and time. Vital signs are normal. She appears well-developed and well-nourished. She is cooperative. She does not have a sickly appearance. She does not appear ill. No distress.   HENT:   Mouth/Throat: Oropharynx is clear and moist.   Eyes: Conjunctivae are normal.   Neck: Phonation normal. Neck supple. No JVD present. No thyromegaly present.   Cardiovascular: Normal rate, regular rhythm and normal heart sounds.  Exam reveals no gallop.    No murmur heard.  Pulmonary/Chest: Effort normal. She has no decreased breath sounds. She has no wheezes. She has no rhonchi. She has no rales.   Abdominal: Soft. She exhibits no distension. There is no hepatosplenomegaly. There is no tenderness. There is no CVA tenderness.   Neurological: She is alert and oriented to person, place, and time.   Skin: Skin is warm and dry. No rash noted.   Psychiatric: She has a normal mood and affect.          History concerning for possible PE given oral contraceptive use.    No ability to perform d-dimer or advanced imaging at this facility at this time.  Advised patient of need for ED evaluation and management; she agreed.  Patient appears stable enough to transport by private vehicle.  [facility] ED transfer service " notified.     Assessment/Plan:     1. Chest pain, unspecified type  Normal sinus rhythm, normal axis, no ST-T wave abnormality- EKG - Clinic Performed  - DX-CHEST-2 VIEWS; per radiologist:  No acute cardiopulmonary findings.  - hyoscyamine-maalox plus-lidocaine viscous (GI COCKTAIL); Take 30 mL by mouth Once for 1 dose.  Dispense: 30 mL; Refill: 0

## 2019-02-05 NOTE — ED PROVIDER NOTES
CHIEF COMPLAINT  Chief Complaint   Patient presents with   • Chest Pain       HPI  Flaca Nickerson is a 28 y.o. female who presents tonight with her boyfriend with a chief complaint of sharp stabbing left-sided chest pain that started today after she ate oatmeal and drank coffee this morning for breakfast.  She states she thought it was just acid reflux and took some Tums while she was at work today but it did nothing for the pain.  As the day has progressed the pain has worsened.  She denies any nausea, vomiting, diaphoresis, shortness of breath associated with this.  She states the pain does worsen with deep inspiration and movement.  She is on birth control pills but denies smoking tobacco.  She has had no recent long trips in a car or plane and she has no calf tenderness.  She has had no upper respiratory symptoms with recent cough congestion or fever.  She went to [facility] urgent care earlier today and had a chest x-ray performed and an EKG both of which were read as negative.  We repeated EKG here and it was also negative.  REVIEW OF SYSTEMS  See HPI for further details. All other system reviews are negative.    PAST MEDICAL HISTORY  Past Medical History:   Diagnosis Date   • Abdominal mass 9/10/2013   • Adenomatous polyps     in colon   • ASTHMA     outgrown per mother   • Behcet's disease (HCC)    • Behcet's disease (HCC)    • Colon polyps 9/10/2013   • HYPOTENSION    • Migraine    • Mouth ulcer     abcess   • Palpitation    • Palpitations     SVT   • SVT (supraventricular tachycardia) (HCC)    • Typhoid fever        FAMILY HISTORY  Family History   Problem Relation Age of Onset   • Heart Disease Father    • Thyroid Mother    • Diabetes Maternal Grandmother    • Hypertension Maternal Grandmother    • Hypertension Maternal Grandfather    • Heart Disease Paternal Grandfather    • Cancer Maternal Aunt 35        breast ca       SOCIAL HISTORY  Social History     Social History   • Marital status: Single  "    Spouse name: N/A   • Number of children: N/A   • Years of education: N/A     Social History Main Topics   • Smoking status: Never Smoker   • Smokeless tobacco: Never Used   • Alcohol use 0.0 oz/week      Comment: rarely   • Drug use: No   • Sexual activity: Yes     Partners: Male     Birth control/ protection: Condom, Pill      Comment: single     Other Topics Concern   • Not on file     Social History Narrative   • No narrative on file       SURGICAL HISTORY  Past Surgical History:   Procedure Laterality Date   • TONSILLECTOMY  2004   • APPENDECTOMY         CURRENT MEDICATIONS  See nurse's notes    ALLERGIES  Allergies   Allergen Reactions   • Iodine Hives and Rash         • Morphine Rash     \"Skin burny rash.\"       PHYSICAL EXAM  VITAL SIGNS: /72   Pulse 60   Temp 36.6 °C (97.8 °F) (Temporal)   Resp (!) 21   Ht 1.549 m (5' 1\")   Wt 72.6 kg (160 lb 0.9 oz)   SpO2 97%   BMI 30.24 kg/m²     Constitutional: Patient is well developed, well nourished in mild distress, somewhat anxious.  HENT: Normocephalic, Oropharynx moist , nose normal with no drainage.   Eyes: PERRL, EOMI, Conjunctiva without erythema.   Neck: Supple with  Normal range of motion in flexion, extension and lateral rotation. No tenderness along the bony prominences or paraspinal muscles.  Lymphatic: No lymphadenopathy noted.   Cardiovascular: Normal heart rate and rhythm. No murmur.  Thorax & Lungs: Clear and equal breath sounds with good excursion. No respiratory distress, no rhonchi or wheezing. No chest tenderness or signs of trauma .  Abdomen: Bowel sounds normal in all four quadrants. Soft,nontender.   Skin: Warm, Dry, No rashes.   Back: No cervical, thoracic, or lumbosacral tenderness.  Extremities: Peripheral pulses 4/4 No no calf tenderness or swelling.  Musculoskeletal: Normal range of motion in all major joints.  Neurologic: Alert & oriented x 3, Normal motor function, Normal sensory function, DTR's 4/4 " bilaterally.  Psychiatric: Affect normal, Judgment normal, Mood normal.     EKG  Twelve-lead EKG was performed and read by myself to show a sinus bradycardia at a rate of 57 bpm.  There is no acute ST elevation or depression.  No ventricular ectopy.  No QT prolongation or A-V dissociation, no axis deviation and no old EKGs for comparison.    RADIOLOGY/PROCEDURES  Chest x-ray was performed today at [facility] and it was read as negative for any acute cardiopulmonary disease.      COURSE & MEDICAL DECISION MAKING  Pertinent Labs & Imaging studies reviewed. (See chart for details)  Patient had a twelve-lead EKG which was normal, chest x-ray was unremarkable, laboratories were all negative as well including a negative d-dimer.  Her white blood cell count was within normal limits.  I gave her some fluids and some Toradol 30 mg IV push and she is pain-free upon recheck.  My plan will be to send her home with a prescription for Toradol, she is to increase clear fluids, no dairy products, cool mist humidifier at the bedside, rest and follow-up with her primary care provider in the week for recheck.    FINAL IMPRESSION  1.  Pleuritic chest pain secondary to pleurisy  2.   3.         Electronically signed by: Josephine Dhillon, 2/4/2019 11:04 PMED Provider Note

## 2019-02-05 NOTE — DISCHARGE INSTRUCTIONS
Increase fluids especially water and water based products, no dairy products for the next 2-3 days  Cool mist humidifier at your bedside  Drink hot tea with lemon and honey and breathe in the hot moist air.  Take the medications as directed with food  Follow-up with your primary care provider within the week for recheck and return if any problems or worsening.

## 2019-02-05 NOTE — ED TRIAGE NOTES
Patient sent by  for chest pain and diaphoresis when she goes from sitting to standing. The symptoms started today. She denies any dizziness, n/v. She started new birth control 2 months ago but denies smoking or recent travel.

## 2019-03-08 ENCOUNTER — HOSPITAL ENCOUNTER (OUTPATIENT)
Dept: LAB | Facility: MEDICAL CENTER | Age: 29
End: 2019-03-08
Attending: FAMILY MEDICINE
Payer: COMMERCIAL

## 2019-03-08 DIAGNOSIS — Z11.3 ROUTINE SCREENING FOR STI (SEXUALLY TRANSMITTED INFECTION): ICD-10-CM

## 2019-03-08 LAB — TREPONEMA PALLIDUM IGG+IGM AB [PRESENCE] IN SERUM OR PLASMA BY IMMUNOASSAY: NON REACTIVE

## 2019-03-08 PROCEDURE — 87389 HIV-1 AG W/HIV-1&-2 AB AG IA: CPT

## 2019-03-08 PROCEDURE — 86780 TREPONEMA PALLIDUM: CPT

## 2019-03-08 PROCEDURE — 86803 HEPATITIS C AB TEST: CPT

## 2019-03-08 PROCEDURE — 36415 COLL VENOUS BLD VENIPUNCTURE: CPT

## 2019-03-09 LAB
HCV AB SER QL: NEGATIVE
HIV 1+2 AB+HIV1 P24 AG SERPL QL IA: NON REACTIVE

## 2019-05-24 ENCOUNTER — OFFICE VISIT (OUTPATIENT)
Dept: URGENT CARE | Facility: PHYSICIAN GROUP | Age: 29
End: 2019-05-24
Payer: COMMERCIAL

## 2019-05-24 ENCOUNTER — HOSPITAL ENCOUNTER (OUTPATIENT)
Facility: MEDICAL CENTER | Age: 29
End: 2019-05-24
Attending: NURSE PRACTITIONER
Payer: COMMERCIAL

## 2019-05-24 VITALS
RESPIRATION RATE: 16 BRPM | OXYGEN SATURATION: 98 % | BODY MASS INDEX: 27.4 KG/M2 | DIASTOLIC BLOOD PRESSURE: 72 MMHG | TEMPERATURE: 99.7 F | WEIGHT: 145 LBS | SYSTOLIC BLOOD PRESSURE: 110 MMHG | HEART RATE: 85 BPM

## 2019-05-24 DIAGNOSIS — R30.0 DYSURIA: ICD-10-CM

## 2019-05-24 DIAGNOSIS — Z20.2 POTENTIAL EXPOSURE TO STD: ICD-10-CM

## 2019-05-24 LAB
APPEARANCE UR: NORMAL
BILIRUB UR STRIP-MCNC: NEGATIVE MG/DL
COLOR UR AUTO: NORMAL
GLUCOSE UR STRIP.AUTO-MCNC: NEGATIVE MG/DL
KETONES UR STRIP.AUTO-MCNC: NORMAL MG/DL
LEUKOCYTE ESTERASE UR QL STRIP.AUTO: NORMAL
NITRITE UR QL STRIP.AUTO: NEGATIVE
PH UR STRIP.AUTO: 5.5 [PH] (ref 5–8)
PROT UR QL STRIP: NEGATIVE MG/DL
RBC UR QL AUTO: NEGATIVE
SP GR UR STRIP.AUTO: 1.03
UROBILINOGEN UR STRIP-MCNC: 0.2 MG/DL

## 2019-05-24 PROCEDURE — 87086 URINE CULTURE/COLONY COUNT: CPT

## 2019-05-24 PROCEDURE — 87480 CANDIDA DNA DIR PROBE: CPT

## 2019-05-24 PROCEDURE — 87510 GARDNER VAG DNA DIR PROBE: CPT

## 2019-05-24 PROCEDURE — 99214 OFFICE O/P EST MOD 30 MIN: CPT | Performed by: NURSE PRACTITIONER

## 2019-05-24 PROCEDURE — 87591 N.GONORRHOEAE DNA AMP PROB: CPT

## 2019-05-24 PROCEDURE — 87660 TRICHOMONAS VAGIN DIR PROBE: CPT

## 2019-05-24 PROCEDURE — 81002 URINALYSIS NONAUTO W/O SCOPE: CPT | Performed by: NURSE PRACTITIONER

## 2019-05-24 PROCEDURE — 87491 CHLMYD TRACH DNA AMP PROBE: CPT

## 2019-05-24 ASSESSMENT — ENCOUNTER SYMPTOMS
MYALGIAS: 0
RESPIRATORY NEGATIVE: 1
ABDOMINAL PAIN: 0
CONSTIPATION: 0
CARDIOVASCULAR NEGATIVE: 1
NECK PAIN: 0
HEADACHES: 0
NEUROLOGICAL NEGATIVE: 1
CHILLS: 0
VOMITING: 0
FLANK PAIN: 0
SHORTNESS OF BREATH: 0
FEVER: 0
SWEATS: 0
WHEEZING: 0
BACK PAIN: 0
NAUSEA: 0
DIZZINESS: 0
EYES NEGATIVE: 1
DIARRHEA: 0

## 2019-05-25 DIAGNOSIS — Z20.2 POTENTIAL EXPOSURE TO STD: ICD-10-CM

## 2019-05-25 DIAGNOSIS — R30.0 DYSURIA: ICD-10-CM

## 2019-05-25 LAB
C TRACH DNA SPEC QL NAA+PROBE: NEGATIVE
N GONORRHOEA DNA SPEC QL NAA+PROBE: NEGATIVE
SPECIMEN SOURCE: NORMAL

## 2019-05-25 NOTE — PROGRESS NOTES
Subjective:   Flaca Nickerson is a 29 y.o. female who presents for Vaginal Itching (burning,irritation,x 1 week)        Dysuria    This is a new problem. The current episode started 1 to 4 weeks ago. The problem occurs intermittently. The problem has been waxing and waning. The quality of the pain is described as burning and aching. She is sexually active. There is no history of pyelonephritis. Associated symptoms include a discharge. Pertinent negatives include no chills, flank pain, frequency, hematuria, hesitancy, nausea, possible pregnancy, sweats, urgency or vomiting. Associated symptoms comments: States with yellow discharge. . She has tried nothing for the symptoms. The treatment provided no relief.      States with recent potential exposure to STD. Also states she has been taking spin class more frequently and pain in urethral area. LMP was last week. Denies chance for pregnancy.    Review of Systems   Constitutional: Negative for chills and fever.   Eyes: Negative.    Respiratory: Negative.  Negative for shortness of breath and wheezing.    Cardiovascular: Negative.  Negative for chest pain.   Gastrointestinal: Negative for abdominal pain, constipation, diarrhea, nausea and vomiting.   Genitourinary: Positive for dysuria. Negative for flank pain, frequency, hematuria, hesitancy and urgency.        Vaginal discharge   Musculoskeletal: Negative for back pain, myalgias and neck pain.   Skin: Negative.    Neurological: Negative.  Negative for dizziness and headaches.     PMH:  has a past medical history of Abdominal mass (9/10/2013); Adenomatous polyps; ASTHMA; Behcet's disease ([facility]); Behcet's disease ([facility]); Colon polyps (9/10/2013); HYPOTENSION; Migraine; Mouth ulcer; Palpitation; Palpitations; SVT (supraventricular tachycardia) ([facility]); and Typhoid fever.  MEDS:   Current Outpatient Prescriptions:   •  Norethin Ace-Eth Estrad-FE (LOESTRIN 24 FE) 1-20 MG-MCG(24) Tab, Take 1 tablet by mouth every day.,  "Disp: 84 Tab, Rfl: 0  ALLERGIES:   Allergies   Allergen Reactions   • Iodine Hives and Rash         • Morphine Rash     \"Skin burny rash.\"     SURGHX:   Past Surgical History:   Procedure Laterality Date   • TONSILLECTOMY  2004   • APPENDECTOMY       SOCHX:  reports that she has never smoked. She has never used smokeless tobacco. She reports that she drinks alcohol. She reports that she does not use drugs.  FH: Family history was reviewed, no pertinent findings to report     Objective:   /72 (BP Location: Right arm, Patient Position: Sitting, BP Cuff Size: Adult)   Pulse 85   Temp 37.6 °C (99.7 °F) (Temporal)   Resp 16   Wt 65.8 kg (145 lb)   SpO2 98%   BMI 27.40 kg/m²   Physical Exam   Constitutional: She is oriented to person, place, and time. She appears well-developed and well-nourished. No distress.   HENT:   Head: Normocephalic.   Right Ear: Hearing normal.   Left Ear: Hearing normal.   Nose: Nose normal.   Mouth/Throat: Oropharynx is clear and moist and mucous membranes are normal. No posterior oropharyngeal erythema.   Eyes: Pupils are equal, round, and reactive to light. Conjunctivae are normal.   Cardiovascular: Normal rate, regular rhythm and normal heart sounds.    No murmur heard.  Pulmonary/Chest: Effort normal and breath sounds normal. No respiratory distress.   Abdominal: Soft. Normal appearance and bowel sounds are normal. She exhibits no distension. There is no hepatosplenomegaly. There is no tenderness. There is no CVA tenderness.   Genitourinary:   Genitourinary Comments: Patient declines  exam and prefers self swab   Neurological: She is alert and oriented to person, place, and time.   Skin: Skin is warm and dry. Capillary refill takes less than 2 seconds. No rash noted. She is not diaphoretic.   Psychiatric: She has a normal mood and affect. Her speech is normal and behavior is normal. Judgment and thought content normal.   Vitals reviewed.        Assessment/Plan:   Assessment  "   1. Dysuria  - POCT Urinalysis  - URINE CULTURE(NEW); Future    2. Potential exposure to STD  - CHLAMYDIA/GC PCR URINE OR SWAB; Future  - VAGINAL PATHOGENS DNA PANEL; Future    UA with trace leuks, will send for culture.  Vaginal swabs sent and will call with results. She states ok to leave detailed voicemail with positive or negative results.    Differential diagnosis, natural history, supportive care, and indications for immediate follow-up discussed.

## 2019-05-26 ENCOUNTER — TELEPHONE (OUTPATIENT)
Dept: URGENT CARE | Facility: CLINIC | Age: 29
End: 2019-05-26

## 2019-05-26 DIAGNOSIS — B96.89 BACTERIAL VAGINOSIS: ICD-10-CM

## 2019-05-26 DIAGNOSIS — N76.0 BACTERIAL VAGINOSIS: ICD-10-CM

## 2019-05-26 RX ORDER — METRONIDAZOLE 500 MG/1
500 TABLET ORAL 2 TIMES DAILY
Qty: 14 TAB | Refills: 0 | Status: SHIPPED | OUTPATIENT
Start: 2019-05-26 | End: 2019-06-02

## 2019-05-26 NOTE — TELEPHONE ENCOUNTER
Called and left detailed voicemail with negative gonorrhea/chlamydia results. Will call back with urine culture and vaginal pathogens once resulted. Instructed to call back office with any questions/concerns.

## 2019-05-26 NOTE — TELEPHONE ENCOUNTER
Called and left voicemail for patient to return phone call so we can discuss positive BV results. I have already sent prescription to pharmacy.

## 2019-05-26 NOTE — TELEPHONE ENCOUNTER
Called and spoke with patient regarding positive BV results. She is aware to take prescription and complete the full course.

## 2019-05-27 ENCOUNTER — TELEPHONE (OUTPATIENT)
Dept: URGENT CARE | Facility: PHYSICIAN GROUP | Age: 29
End: 2019-05-27

## 2019-05-27 DIAGNOSIS — N39.0 URINARY TRACT INFECTION WITHOUT HEMATURIA, SITE UNSPECIFIED: ICD-10-CM

## 2019-05-27 LAB
BACTERIA UR CULT: ABNORMAL
BACTERIA UR CULT: ABNORMAL
SIGNIFICANT IND 70042: ABNORMAL
SITE SITE: ABNORMAL
SOURCE SOURCE: ABNORMAL

## 2019-05-27 RX ORDER — NITROFURANTOIN 25; 75 MG/1; MG/1
100 CAPSULE ORAL EVERY 12 HOURS
Qty: 10 CAP | Refills: 0 | Status: SHIPPED | OUTPATIENT
Start: 2019-05-27 | End: 2019-06-01

## 2019-06-02 ENCOUNTER — HOSPITAL ENCOUNTER (EMERGENCY)
Facility: MEDICAL CENTER | Age: 29
End: 2019-06-02
Payer: COMMERCIAL

## 2019-06-02 VITALS
WEIGHT: 144 LBS | HEART RATE: 79 BPM | TEMPERATURE: 97.1 F | DIASTOLIC BLOOD PRESSURE: 76 MMHG | BODY MASS INDEX: 23.14 KG/M2 | HEIGHT: 66 IN | SYSTOLIC BLOOD PRESSURE: 112 MMHG | RESPIRATION RATE: 16 BRPM | OXYGEN SATURATION: 98 %

## 2019-06-02 PROCEDURE — 302449 STATCHG TRIAGE ONLY (STATISTIC)

## 2019-06-02 ASSESSMENT — PAIN SCALES - WONG BAKER: WONGBAKER_NUMERICALRESPONSE: HURTS A WHOLE LOT

## 2019-06-02 NOTE — ED TRIAGE NOTES
Pt to triage by w/c c/o right-sided abdominal pain with 1 episode of vomiting x 30 min ago.  Pt states she felt fine when she woke up.  Reports taking 2 antibiotics for UTI & BV since Friday.

## 2019-06-04 ENCOUNTER — OFFICE VISIT (OUTPATIENT)
Dept: URGENT CARE | Facility: CLINIC | Age: 29
End: 2019-06-04
Payer: COMMERCIAL

## 2019-06-04 VITALS
SYSTOLIC BLOOD PRESSURE: 110 MMHG | HEART RATE: 90 BPM | TEMPERATURE: 98.7 F | HEIGHT: 61 IN | BODY MASS INDEX: 29.07 KG/M2 | OXYGEN SATURATION: 97 % | WEIGHT: 154 LBS | RESPIRATION RATE: 16 BRPM | DIASTOLIC BLOOD PRESSURE: 70 MMHG

## 2019-06-04 DIAGNOSIS — R09.82 PND (POST-NASAL DRIP): ICD-10-CM

## 2019-06-04 DIAGNOSIS — J02.9 PHARYNGITIS, UNSPECIFIED ETIOLOGY: ICD-10-CM

## 2019-06-04 LAB
INT CON NEG: NORMAL
INT CON POS: NORMAL
S PYO AG THROAT QL: NEGATIVE

## 2019-06-04 PROCEDURE — 99214 OFFICE O/P EST MOD 30 MIN: CPT | Performed by: PHYSICIAN ASSISTANT

## 2019-06-04 PROCEDURE — 87880 STREP A ASSAY W/OPTIC: CPT | Performed by: PHYSICIAN ASSISTANT

## 2019-06-04 RX ORDER — FLUTICASONE PROPIONATE 50 MCG
2 SPRAY, SUSPENSION (ML) NASAL DAILY
Qty: 16 G | Refills: 0 | Status: CANCELLED | OUTPATIENT
Start: 2019-06-04

## 2019-06-04 RX ORDER — FLUTICASONE PROPIONATE 50 MCG
2 SPRAY, SUSPENSION (ML) NASAL DAILY
Qty: 16 G | Refills: 0 | Status: SHIPPED | OUTPATIENT
Start: 2019-06-04 | End: 2019-11-05

## 2019-06-04 ASSESSMENT — ENCOUNTER SYMPTOMS
VOMITING: 0
FEVER: 1
SORE THROAT: 1
COUGH: 0
ABDOMINAL PAIN: 0
NAUSEA: 0
SPUTUM PRODUCTION: 0
WHEEZING: 0
SHORTNESS OF BREATH: 0
SINUS PAIN: 0
DIARRHEA: 0
CHILLS: 0

## 2019-06-04 NOTE — LETTER
June 4, 2019       Patient: Flaca Nickerson   YOB: 1990   Date of Visit: 6/4/2019         To Whom It May Concern:    It is my medical opinion that Flaca Nickerson should be excused from work for today due to illness.    If you have any questions or concerns, please don't hesitate to call [phone]          Sincerely,          Patrice De Anda P.A.-C.  Electronically Signed

## 2019-06-04 NOTE — PROGRESS NOTES
"Subjective:   Flaca Nickerson is a 29 y.o. female who presents for Sore Throat (xtoday)        Pharyngitis    This is a new problem. The current episode started today. Associated symptoms include congestion. Pertinent negatives include no abdominal pain, coughing, diarrhea, ear pain, shortness of breath or vomiting.     Notes today w/ onset St, subj fever/chills, c/o tylenol, denies cough, denies earpain, denies nauesa/voiting/abdpain/diarrhea, denies rash, denies PMH of strep. Brother did have strep last week. Denies PMH of asthma/bronchitis/pneumonia. PMH of ashtma, denies wheeze. Taking macrobid.     Review of Systems   Constitutional: Positive for fever ( subj). Negative for chills.   HENT: Positive for congestion and sore throat. Negative for ear pain and sinus pain.    Respiratory: Negative for cough, sputum production, shortness of breath and wheezing.    Gastrointestinal: Negative for abdominal pain, diarrhea, nausea and vomiting.   Skin: Negative for rash.     Allergies   Allergen Reactions   • Iodine Hives and Rash         • Morphine Rash     \"Skin burny rash.\"      Objective:   /70 (BP Location: Left arm, Patient Position: Sitting, BP Cuff Size: Adult)   Pulse 90   Temp 37.1 °C (98.7 °F) (Temporal)   Resp 16   Ht 1.549 m (5' 1\")   Wt 69.9 kg (154 lb)   LMP 05/19/2019 (Approximate)   SpO2 97%   BMI 29.10 kg/m²   Physical Exam   Constitutional: She is oriented to person, place, and time. She appears well-developed and well-nourished. No distress.   HENT:   Head: Normocephalic and atraumatic.   Right Ear: Tympanic membrane, external ear and ear canal normal.   Left Ear: Tympanic membrane, external ear and ear canal normal.   Nose: Nose normal.   Mouth/Throat: Uvula is midline and mucous membranes are normal. Posterior oropharyngeal erythema (moderate PND ) present. No oropharyngeal exudate, posterior oropharyngeal edema or tonsillar abscesses. Tonsils are 0 on the right. Tonsils are " 0 on the left. No tonsillar exudate.   Eyes: Conjunctivae and lids are normal. Right eye exhibits no discharge. Left eye exhibits no discharge. No scleral icterus.   Neck: Neck supple.   Pulmonary/Chest: Effort normal. No respiratory distress. She has no decreased breath sounds. She has no wheezes. She has no rhonchi. She has no rales.   Musculoskeletal: Normal range of motion.   Lymphadenopathy:     She has cervical adenopathy ( mild bilat).   Neurological: She is alert and oriented to person, place, and time. She is not disoriented.   Skin: Skin is warm and dry. She is not diaphoretic. No erythema. No pallor.   Psychiatric: Her speech is normal and behavior is normal.   Nursing note and vitals reviewed.  POCT Strep - NEG      Assessment/Plan:   1. Pharyngitis, unspecified etiology  - POCT Rapid Strep A  - lidocaine viscous 2% (XYLOCAINE) 2 % Solution; Take 5 mL by mouth as needed for Throat/Mouth Pain (q6hr PRN throat pain, ok to rinse and spit or swallow).  Dispense: 120 mL; Refill: 0  - fluticasone (FLONASE) 50 MCG/ACT nasal spray; Spray 2 Sprays in nose every day.  Dispense: 16 g; Refill: 0    2. PND (post-nasal drip)  - lidocaine viscous 2% (XYLOCAINE) 2 % Solution; Take 5 mL by mouth as needed for Throat/Mouth Pain (q6hr PRN throat pain, ok to rinse and spit or swallow).  Dispense: 120 mL; Refill: 0  - fluticasone (FLONASE) 50 MCG/ACT nasal spray; Spray 2 Sprays in nose every day.  Dispense: 16 g; Refill: 0    Other orders  - Nitrofurantoin Monohyd Macro (MACROBID PO); Take  by mouth.  Supportive care is reviewed with patient/caregiver - recommend to push PO fluids and electrolytes, Nsaids/tylenol, netti pot/saline irrig, humidifier in home, flonase, ponaris, antihistamine, viscous lido  Return to clinic with lack of resolution or progression of symptoms.    Differential diagnosis, natural history, supportive care, and indications for immediate follow-up discussed.

## 2019-06-05 ENCOUNTER — APPOINTMENT (OUTPATIENT)
Dept: RADIOLOGY | Facility: MEDICAL CENTER | Age: 29
End: 2019-06-05
Attending: EMERGENCY MEDICINE
Payer: COMMERCIAL

## 2019-06-05 ENCOUNTER — HOSPITAL ENCOUNTER (EMERGENCY)
Facility: MEDICAL CENTER | Age: 29
End: 2019-06-05
Attending: EMERGENCY MEDICINE
Payer: COMMERCIAL

## 2019-06-05 VITALS
DIASTOLIC BLOOD PRESSURE: 72 MMHG | BODY MASS INDEX: 26.43 KG/M2 | HEART RATE: 66 BPM | TEMPERATURE: 98.5 F | WEIGHT: 140 LBS | OXYGEN SATURATION: 97 % | RESPIRATION RATE: 16 BRPM | HEIGHT: 61 IN | SYSTOLIC BLOOD PRESSURE: 125 MMHG

## 2019-06-05 DIAGNOSIS — N83.201 HEMORRHAGIC CYST OF RIGHT OVARY: ICD-10-CM

## 2019-06-05 LAB
ALBUMIN SERPL BCP-MCNC: 4.1 G/DL (ref 3.2–4.9)
ALBUMIN/GLOB SERPL: 1.4 G/DL
ALP SERPL-CCNC: 58 U/L (ref 30–99)
ALT SERPL-CCNC: 12 U/L (ref 2–50)
ANION GAP SERPL CALC-SCNC: 11 MMOL/L (ref 0–11.9)
AST SERPL-CCNC: 17 U/L (ref 12–45)
BASOPHILS # BLD AUTO: 0.4 % (ref 0–1.8)
BASOPHILS # BLD: 0.04 K/UL (ref 0–0.12)
BILIRUB SERPL-MCNC: 0.4 MG/DL (ref 0.1–1.5)
BUN SERPL-MCNC: 10 MG/DL (ref 8–22)
CALCIUM SERPL-MCNC: 9.4 MG/DL (ref 8.5–10.5)
CHLORIDE SERPL-SCNC: 106 MMOL/L (ref 96–112)
CO2 SERPL-SCNC: 22 MMOL/L (ref 20–33)
CREAT SERPL-MCNC: 0.64 MG/DL (ref 0.5–1.4)
EOSINOPHIL # BLD AUTO: 0.19 K/UL (ref 0–0.51)
EOSINOPHIL NFR BLD: 1.8 % (ref 0–6.9)
ERYTHROCYTE [DISTWIDTH] IN BLOOD BY AUTOMATED COUNT: 42 FL (ref 35.9–50)
GLOBULIN SER CALC-MCNC: 3 G/DL (ref 1.9–3.5)
GLUCOSE SERPL-MCNC: 83 MG/DL (ref 65–99)
HCG SERPL QL: NEGATIVE
HCT VFR BLD AUTO: 39.3 % (ref 37–47)
HGB BLD-MCNC: 13.2 G/DL (ref 12–16)
IMM GRANULOCYTES # BLD AUTO: 0.04 K/UL (ref 0–0.11)
IMM GRANULOCYTES NFR BLD AUTO: 0.4 % (ref 0–0.9)
LACTATE BLD-SCNC: 1.7 MMOL/L (ref 0.5–2)
LIPASE SERPL-CCNC: 21 U/L (ref 11–82)
LYMPHOCYTES # BLD AUTO: 2.17 K/UL (ref 1–4.8)
LYMPHOCYTES NFR BLD: 20.8 % (ref 22–41)
MCH RBC QN AUTO: 29.7 PG (ref 27–33)
MCHC RBC AUTO-ENTMCNC: 33.6 G/DL (ref 33.6–35)
MCV RBC AUTO: 88.5 FL (ref 81.4–97.8)
MONOCYTES # BLD AUTO: 0.9 K/UL (ref 0–0.85)
MONOCYTES NFR BLD AUTO: 8.6 % (ref 0–13.4)
NEUTROPHILS # BLD AUTO: 7.1 K/UL (ref 2–7.15)
NEUTROPHILS NFR BLD: 68 % (ref 44–72)
NRBC # BLD AUTO: 0 K/UL
NRBC BLD-RTO: 0 /100 WBC
PLATELET # BLD AUTO: 337 K/UL (ref 164–446)
PMV BLD AUTO: 10.1 FL (ref 9–12.9)
POTASSIUM SERPL-SCNC: 3.7 MMOL/L (ref 3.6–5.5)
PROT SERPL-MCNC: 7.1 G/DL (ref 6–8.2)
RBC # BLD AUTO: 4.44 M/UL (ref 4.2–5.4)
SODIUM SERPL-SCNC: 139 MMOL/L (ref 135–145)
WBC # BLD AUTO: 10.4 K/UL (ref 4.8–10.8)

## 2019-06-05 PROCEDURE — 99285 EMERGENCY DEPT VISIT HI MDM: CPT

## 2019-06-05 PROCEDURE — 83690 ASSAY OF LIPASE: CPT

## 2019-06-05 PROCEDURE — 84703 CHORIONIC GONADOTROPIN ASSAY: CPT

## 2019-06-05 PROCEDURE — 96374 THER/PROPH/DIAG INJ IV PUSH: CPT

## 2019-06-05 PROCEDURE — 83605 ASSAY OF LACTIC ACID: CPT

## 2019-06-05 PROCEDURE — 96375 TX/PRO/DX INJ NEW DRUG ADDON: CPT

## 2019-06-05 PROCEDURE — 700111 HCHG RX REV CODE 636 W/ 250 OVERRIDE (IP): Performed by: EMERGENCY MEDICINE

## 2019-06-05 PROCEDURE — 85025 COMPLETE CBC W/AUTO DIFF WBC: CPT

## 2019-06-05 PROCEDURE — 76856 US EXAM PELVIC COMPLETE: CPT

## 2019-06-05 PROCEDURE — 80053 COMPREHEN METABOLIC PANEL: CPT

## 2019-06-05 RX ORDER — OXYCODONE HYDROCHLORIDE AND ACETAMINOPHEN 5; 325 MG/1; MG/1
1 TABLET ORAL ONCE
Status: DISCONTINUED | OUTPATIENT
Start: 2019-06-05 | End: 2019-06-05 | Stop reason: HOSPADM

## 2019-06-05 RX ORDER — OXYCODONE HYDROCHLORIDE AND ACETAMINOPHEN 5; 325 MG/1; MG/1
1-2 TABLET ORAL EVERY 4 HOURS PRN
Qty: 15 TAB | Refills: 0 | Status: SHIPPED | OUTPATIENT
Start: 2019-06-05 | End: 2019-06-08

## 2019-06-05 RX ORDER — ONDANSETRON 2 MG/ML
4 INJECTION INTRAMUSCULAR; INTRAVENOUS ONCE
Status: DISCONTINUED | OUTPATIENT
Start: 2019-06-05 | End: 2019-06-05 | Stop reason: HOSPADM

## 2019-06-05 RX ORDER — HYDROMORPHONE HYDROCHLORIDE 1 MG/ML
0.5 INJECTION, SOLUTION INTRAMUSCULAR; INTRAVENOUS; SUBCUTANEOUS ONCE
Status: COMPLETED | OUTPATIENT
Start: 2019-06-05 | End: 2019-06-05

## 2019-06-05 RX ORDER — ONDANSETRON 2 MG/ML
4 INJECTION INTRAMUSCULAR; INTRAVENOUS ONCE
Status: COMPLETED | OUTPATIENT
Start: 2019-06-05 | End: 2019-06-05

## 2019-06-05 RX ADMIN — HYDROMORPHONE HYDROCHLORIDE 0.5 MG: 1 INJECTION, SOLUTION INTRAMUSCULAR; INTRAVENOUS; SUBCUTANEOUS at 09:35

## 2019-06-05 RX ADMIN — ONDANSETRON 4 MG: 2 INJECTION INTRAMUSCULAR; INTRAVENOUS at 09:35

## 2019-06-05 NOTE — ED TRIAGE NOTES
Chief Complaint   Patient presents with   • Abdominal Pain     c/o pain in right side of abdomen. diagnosed w/ovarian cyst at [facility] and possible ovarian torsion. her for possible surgery and OB consult.     Pt noted in obvious discomfort. Triage process explained. Instructed to notify staff for any worsening symptoms.

## 2019-06-05 NOTE — ED PROVIDER NOTES
"ED Provider Note    ED Provider Note    Primary care provider: DEVANTE Linares  Means of arrival: Walk-in  History obtained from: Patient    CHIEF COMPLAINT  Chief Complaint   Patient presents with   • Abdominal Pain     c/o pain in right side of abdomen. diagnosed w/ovarian cyst at [facility] and possible ovarian torsion. her for possible surgery and OB consult.     Seen at 9:12 AM.   BE Nickerson is a 29 y.o. 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  The patient first developed a dull ache in the pelvis approximately 5 days ago, she went to an urgent care, urinalysis resulted 4 days ago that showed she had a \"severe \"urinary tract infection.  She was started on Macrobid and has been on this medication for the past 72 hours.  Despite this she noted abruptly worsening more localized right lower quadrant pelvic pain yesterday evening.  She states it feels like glass in her abdomen and it is significantly worse with any movement.    She went to the emergency department at [facility] yesterday evening, she was told that she had a possible hemorrhagic ovarian cyst and was discharged.    The radiology images were over read in the morning that showed: R ovary-complex cystic right ovarian mass without evidence of internal vascular flow or solid mural component measuring 2.5 x 3.1 cm.  The right ovary measures 5.9 x 4.2 x 4.0 cm.  Impression: Enlarged right ovary with only peripheral vascular flow demonstrated on color Doppler interrogation.  It is concerning for ovarian torsion.  This was relayed to the emergency physician who ordered the test and the patient was then contacted and told to come back for GYN evaluation.    Urinalysis at the outside facility shows 3-5 RBCs, 3-5 RBCs, few bacteria.  WBC 16.2 with normal differential.    Patient denies any fevers, chills, chest pain, shortness of breath, " "diarrhea, constipation, rash.  She does have some impaired immunity due to Behcet's.  She has a prior history of polio.  She vomited early this morning secondary to pain.  She currently denies any nausea.  She continues to have dysuria.    REVIEW OF SYSTEMS  See HPI,   Remainder of ROS negative.     PAST MEDICAL HISTORY   has a past medical history of Abdominal mass (9/10/2013); Adenomatous polyps; ASTHMA; Behcet's disease (HCC); Behcet's disease (HCC); Colon polyps (9/10/2013); HYPOTENSION; Migraine; Mouth ulcer; Palpitation; Palpitations; SVT (supraventricular tachycardia) (HCC); and Typhoid fever.    SURGICAL HISTORY   has a past surgical history that includes tonsillectomy (2004) and appendectomy.    SOCIAL HISTORY  Social History   Substance Use Topics   • Smoking status: Never Smoker   • Smokeless tobacco: Never Used   • Alcohol use 0.0 oz/week      Comment: rarely      History   Drug Use No       FAMILY HISTORY  Family History   Problem Relation Age of Onset   • Heart Disease Father    • Thyroid Mother    • Diabetes Maternal Grandmother    • Hypertension Maternal Grandmother    • Hypertension Maternal Grandfather    • Heart Disease Paternal Grandfather    • Cancer Maternal Aunt 35        breast ca       CURRENT MEDICATIONS  Reviewed.  See Encounter Summary.     ALLERGIES  Allergies   Allergen Reactions   • Iodine Hives and Rash         • Morphine Rash     \"Skin burny rash.\"       PHYSICAL EXAM  VITAL SIGNS: /72   Pulse 66   Temp 36.9 °C (98.5 °F) (Temporal)   Resp 16   Ht 1.56 m (5' 1.42\")   Wt 63.5 kg (140 lb)   LMP 05/19/2019 (Approximate)   SpO2 97%   BMI 26.09 kg/m²   Constitutional: Awake, alert, hyperventilating and appears to be significantly uncomfortable.  HENT: Normocephalic, Bilateral external ears normal. Nose normal.   Eyes: Conjunctiva normal, non-icteric, EOMI.    Thorax & Lungs: Tachypnea, hyperventilation, otherwise clear to auscultation.  Cardiovascular: Regular rate, Regular " rhythm, No murmurs, rubs or gallops.  Abdomen:  Soft, diffuse abdominal tenderness with right lower quadrant predominance, positive psoas and obturator on the right lower quadrant.    :    Skin: Visualized skin is  Dry, No erythema, No rash.   Musculoskeletal:   No cyanosis, clubbing or edema.  Neurologic: Alert, Grossly non-focal.   Psychiatric: Normal affect, Normal mood  Lymphatic:  No cervical LAD      RADIOLOGY  US-PELVIC COMPLETE (TRANSABDOMINAL/TRANSVAGINAL) (COMBO)   Final Result      1.  4.8 cm complex cystic lesion in the right ovary likely represents a complex cyst. Doppler flow is present. Torsion cannot be excluded in the appropriate clinical setting.      2.  Small amount of free fluid in the cul-de-sac and right adnexa            Comment: Results discussed with Dr. Kaur at approximately 10:55 AM            COURSE & MEDICAL DECISION MAKING  Pertinent Labs & Imaging studies reviewed. (See chart for details)    Differential diagnoses include but are not limited to: Ovarian torsion, hemorrhagic cyst    9:31 AM - Medical record reviewed from [facility] paged.  Ordered basic labs to include a lactate, follow-up ultrasound, Dilaudid 0.5 mg IV and Zofran 4 mg IV.    10:16 AM - Dr Moses- repeat US [id number]    10:55 AM - 4.8cm cyst discussed with radiology.  This does not appear to be torsion.  The patient was updated with the test results.    11:09 AM-the repeat ultrasound was discussed with Dr. Azevedo, she agrees with discharge and the patient will follow-up in the clinic promptly.  It seems very unlikely to be torsion at this time.  I discussed the case with the patient as well all questions were answered.      Decision Making:  This is a 29 y.o. year old female who presents with moderate to severe right lower quadrant abdominal pain.  Patient is status post appendectomy, therefore see no indication for CT scan.  Radiology results from the outside facility were compared to today's  ultrasound.  The patient does have flow in the periphery of the ovary, it is not thought to be torsion.  I discussed this case with radiology.  In general the patient had torsion for many hours some edema would be present which there was not.    Her physical exam findings are more consistent with a hemorrhagic cyst as she had a positive psoas and some peritoneal signs.  This was easily treated with 0.5 mill grams of Dilaudid.  The patient did not have any intractable pain and she preferred to remain completely still which is not typical for ovarian torsion/ischemia.  Lactate is also reassuring.    I splinted the family that the only true diagnostic result would be with a laparoscope however based on the ultrasound today, physical exam and discussion with GYN it seems not indicated and not likely.    Therefore the patient will be treated for hemorrhagic cyst with opiates and anti-inflammatories.In prescribing controlled substances to this patient, I certify that I have obtained and reviewed the medical history of Flaca Nickerson. I have also made a good fox effort to obtain applicable records from other providers who have treated the patient and records did not demonstrate any increased risk of substance abuse that would prevent me from prescribing controlled substances.     I have conducted a physical exam and documented it. I have reviewed Ms. Nickerson’s prescription history as maintained by the [place] Prescription Monitoring Program.     I have assessed the patient’s risk for abuse, dependency, and addiction using the validated Opioid Risk Tool available at https://www.mdcalc.com/fphtlp-droe-ffbp-ort-narcotic-abuse.     Given the above, I believe the benefits of controlled substance therapy outweigh the risks. The reasons for prescribing controlled substances include non-narcotic, oral analgesic alternatives have been inadequate for pain control. Accordingly, I have discussed the risk and benefits,  treatment plan, and alternative therapies with the patient.         Discharge Medications:  Discharge Medication List as of 6/5/2019 11:38 AM      START taking these medications    Details   oxyCODONE-acetaminophen (PERCOCET) 5-325 MG Tab Take 1-2 Tabs by mouth every four hours as needed for up to 3 days., Disp-15 Tab, R-0, Print Rx Paper, For 3 days             The patient was discharged home (see d/c instructions) and parent was told to return immediately for any signs or symptoms listed, or any worsening at all.  The patient's parent verbally agreed to the discharge precautions and follow-up plan which is documented in EPIC.    The patient's blood pressure is elevated today. >120/80. I have referred them to primary care for follow up.         FINAL IMPRESSION  1. Hemorrhagic cyst of right ovary

## 2019-06-05 NOTE — ED NOTES
Pt refused second percocet.  Discharge instructiosn provided. Pt states understanding. ED care complete

## 2019-06-29 ENCOUNTER — HOSPITAL ENCOUNTER (OUTPATIENT)
Dept: LAB | Facility: MEDICAL CENTER | Age: 29
End: 2019-06-29
Attending: OBSTETRICS & GYNECOLOGY
Payer: COMMERCIAL

## 2019-06-29 LAB
HAV IGM SERPL QL IA: NEGATIVE
HBV CORE IGM SER QL: NEGATIVE
HBV SURFACE AG SER QL: NEGATIVE
HCV AB SER QL: NEGATIVE
HIV 1+2 AB+HIV1 P24 AG SERPL QL IA: NON REACTIVE
TREPONEMA PALLIDUM IGG+IGM AB [PRESENCE] IN SERUM OR PLASMA BY IMMUNOASSAY: NON REACTIVE

## 2019-06-29 PROCEDURE — 80074 ACUTE HEPATITIS PANEL: CPT

## 2019-06-29 PROCEDURE — 36415 COLL VENOUS BLD VENIPUNCTURE: CPT

## 2019-06-29 PROCEDURE — 87389 HIV-1 AG W/HIV-1&-2 AB AG IA: CPT

## 2019-06-29 PROCEDURE — 86780 TREPONEMA PALLIDUM: CPT

## 2019-07-13 ENCOUNTER — OFFICE VISIT (OUTPATIENT)
Dept: URGENT CARE | Facility: PHYSICIAN GROUP | Age: 29
End: 2019-07-13
Payer: COMMERCIAL

## 2019-07-13 ENCOUNTER — HOSPITAL ENCOUNTER (OUTPATIENT)
Facility: MEDICAL CENTER | Age: 29
End: 2019-07-13
Attending: NURSE PRACTITIONER
Payer: COMMERCIAL

## 2019-07-13 VITALS
HEIGHT: 61 IN | DIASTOLIC BLOOD PRESSURE: 88 MMHG | BODY MASS INDEX: 27.23 KG/M2 | RESPIRATION RATE: 16 BRPM | HEART RATE: 89 BPM | OXYGEN SATURATION: 95 % | WEIGHT: 144.2 LBS | SYSTOLIC BLOOD PRESSURE: 104 MMHG | TEMPERATURE: 98.4 F

## 2019-07-13 DIAGNOSIS — N89.8 VAGINAL DISCHARGE: Primary | ICD-10-CM

## 2019-07-13 LAB
APPEARANCE UR: CLEAR
BILIRUB UR STRIP-MCNC: NORMAL MG/DL
COLOR UR AUTO: YELLOW
GLUCOSE UR STRIP.AUTO-MCNC: NEGATIVE MG/DL
KETONES UR STRIP.AUTO-MCNC: NORMAL MG/DL
LEUKOCYTE ESTERASE UR QL STRIP.AUTO: NORMAL
NITRITE UR QL STRIP.AUTO: NEGATIVE
PH UR STRIP.AUTO: 5.5 [PH] (ref 5–8)
PROT UR QL STRIP: NORMAL MG/DL
RBC UR QL AUTO: NORMAL
SP GR UR STRIP.AUTO: 1.03
UROBILINOGEN UR STRIP-MCNC: 0.2 MG/DL

## 2019-07-13 PROCEDURE — 87086 URINE CULTURE/COLONY COUNT: CPT

## 2019-07-13 PROCEDURE — 81002 URINALYSIS NONAUTO W/O SCOPE: CPT | Performed by: NURSE PRACTITIONER

## 2019-07-13 PROCEDURE — 87591 N.GONORRHOEAE DNA AMP PROB: CPT

## 2019-07-13 PROCEDURE — 87491 CHLMYD TRACH DNA AMP PROBE: CPT

## 2019-07-13 PROCEDURE — 87660 TRICHOMONAS VAGIN DIR PROBE: CPT

## 2019-07-13 PROCEDURE — 99214 OFFICE O/P EST MOD 30 MIN: CPT | Performed by: NURSE PRACTITIONER

## 2019-07-13 PROCEDURE — 87480 CANDIDA DNA DIR PROBE: CPT

## 2019-07-13 PROCEDURE — 87510 GARDNER VAG DNA DIR PROBE: CPT

## 2019-07-13 NOTE — PROGRESS NOTES
"Subjective:     Flaca Nickerson is a 29 y.o. female who presents for UTI (Yellow color in urine, recent BV and ovarian cyst rupture, during hospital stay was told to stop taking BV medication)       Vaginitis   The patient's primary symptoms include a genital odor and vaginal discharge. This is a new problem. The problem has been gradually worsening. Pertinent negatives include no abdominal pain, back pain, dysuria, fever, nausea or urgency.     Patient reports that 2 weeks ago she was seen in urgent care and started on Flagyl for BV.  She states she went to the ER after that because of a ruptured ovarian cyst.  She states she was advised to stop the Flagyl for an unspecified reason.  She returns urgent care today with reports that this morning she started to notice a lot of yellowish, malodorous discharge coming from her pelvic area.  Denies dysuria, urgency, abdominal pain, nausea, fever, chills, back pain, or other symptoms.    PMH:  has a past medical history of Abdominal mass (9/10/2013); Adenomatous polyps; ASTHMA; Behcet's disease (HCC); Behcet's disease (HCC); Colon polyps (9/10/2013); HYPOTENSION; Migraine; Mouth ulcer; Palpitation; Palpitations; SVT (supraventricular tachycardia) ([facility]); and Typhoid fever.    MEDS:   Current Outpatient Prescriptions:   •  Nitrofurantoin Monohyd Macro (MACROBID PO), Take  by mouth., Disp: , Rfl:   •  lidocaine viscous 2% (XYLOCAINE) 2 % Solution, Take 5 mL by mouth as needed for Throat/Mouth Pain (q6hr PRN throat pain, ok to rinse and spit or swallow)., Disp: 120 mL, Rfl: 0  •  fluticasone (FLONASE) 50 MCG/ACT nasal spray, Spray 2 Sprays in nose every day., Disp: 16 g, Rfl: 0  •  Norethin Ace-Eth Estrad-FE (LOESTRIN 24 FE) 1-20 MG-MCG(24) Tab, Take 1 tablet by mouth every day., Disp: 84 Tab, Rfl: 0    ALLERGIES:   Allergies   Allergen Reactions   • Iodine Hives and Rash         • Morphine Rash     \"Skin burny rash.\"     SURGHX:   Past Surgical History:   Procedure " "Laterality Date   • TONSILLECTOMY  2004   • APPENDECTOMY       SOCHX:  reports that she has never smoked. She has never used smokeless tobacco. She reports that she drinks alcohol. She reports that she does not use drugs.     FH: Reviewed with patient, not pertinent to this visit.    Review of Systems   Constitutional: Negative.  Negative for fever and malaise/fatigue.   Respiratory: Negative.  Negative for shortness of breath.    Cardiovascular: Negative.    Gastrointestinal: Negative.  Negative for abdominal pain and nausea.   Genitourinary: Positive for vaginal discharge. Negative for dysuria and urgency.        Vaginal discharge, yellowish, malodorous   Musculoskeletal: Negative for back pain.   Neurological: Negative.    All other systems reviewed and are negative.    Objective:     /88 (BP Location: Right arm, Patient Position: Sitting, BP Cuff Size: Adult)   Pulse 89   Temp 36.9 °C (98.4 °F) (Temporal)   Resp 16   Ht 1.56 m (5' 1.42\")   Wt 65.4 kg (144 lb 3.2 oz)   LMP 07/03/2019   SpO2 95%   BMI 26.87 kg/m²     Physical Exam   Constitutional: She is oriented to person, place, and time. She appears well-developed and well-nourished. She is cooperative.  Non-toxic appearance. No distress.   HENT:   Right Ear: External ear normal.   Left Ear: External ear normal.   Nose: Nose normal.   Mouth/Throat: Mucous membranes are normal.   Eyes: Conjunctivae and EOM are normal.   Neck: Normal range of motion.   Cardiovascular: Normal rate, regular rhythm, normal heart sounds and normal pulses.    Pulmonary/Chest: Effort normal and breath sounds normal. No respiratory distress. She has no decreased breath sounds.   Abdominal: Soft. Bowel sounds are normal. There is no tenderness.   Musculoskeletal: Normal range of motion. She exhibits no deformity.   Neurological: She is alert and oriented to person, place, and time. She has normal strength. No sensory deficit.   Skin: Skin is warm, dry and intact. Capillary " refill takes less than 2 seconds.   Psychiatric: She has a normal mood and affect. Her behavior is normal.   Vitals reviewed.    UA:    Component Results     Component Value Ref Range & Units Status   POC Color Yellow  Negative Final   POC Appearance Clear  Negative Final   POC Leukocyte Esterase Moderate  Negative Final   POC Nitrites Negative  Negative Final   POC Urobiligen 0.2  Negative (0.2) mg/dL Final   POC Protein Trace  Negative mg/dL Final   POC Urine PH 5.5  5.0 - 8.0 Final   POC Blood Trace  Negative Final   POC Specific Gravity 1.030  <1.005 - >1.030 Final   POC Ketones Small  Negative mg/dL Final   POC Bilirubin Small  Negative mg/dL Final   POC Glucose Negative  Negative mg/dL Final        Assessment/Plan:     1. Vaginal discharge  - URINE CULTURE(NEW); Future  - VAGINAL PATHOGENS DNA PANEL; Future  - Chlamydia/GC PCR Urine Or Swab; Future  - POCT Urinalysis    UA positive for moderate LE, negative nitrites. Trace blood. Patient reports that she is currently having vaginal discharge. Urine culture pending.    Chlamydia/GC swab pending.  Patient reports that she is with a new partner recently and would like to get tested again.    Vag path swab pending. Patient will be called with the results.    Discussed close monitoring and supportive measures including increasing fluids and rest as well as OTC symptom management including acetaminophen and/or ibuprofen PRN pain and/or fever.    Patient advised to: Return for 1) Symptoms don't improve or worsen, or go to ER, 2) Follow up with primary care in 7-10 days.    Differential diagnosis, natural history, supportive care, and indications for immediate follow-up discussed. All questions answered. Patient agrees with the plan of care.    Undiagnosed new problem with further workup planned.

## 2019-07-14 DIAGNOSIS — N89.8 VAGINAL DISCHARGE: ICD-10-CM

## 2019-07-14 ASSESSMENT — ENCOUNTER SYMPTOMS
SHORTNESS OF BREATH: 0
CARDIOVASCULAR NEGATIVE: 1
BACK PAIN: 0
CONSTITUTIONAL NEGATIVE: 1
FEVER: 0
NAUSEA: 0
GASTROINTESTINAL NEGATIVE: 1
ABDOMINAL PAIN: 0
VAGINITIS: 1
NEUROLOGICAL NEGATIVE: 1
RESPIRATORY NEGATIVE: 1

## 2019-07-15 LAB
C TRACH DNA SPEC QL NAA+PROBE: NEGATIVE
CANDIDA DNA VAG QL PROBE+SIG AMP: NEGATIVE
G VAGINALIS DNA VAG QL PROBE+SIG AMP: NEGATIVE
N GONORRHOEA DNA SPEC QL NAA+PROBE: NEGATIVE
SPECIMEN SOURCE: NORMAL
T VAGINALIS DNA VAG QL PROBE+SIG AMP: NEGATIVE

## 2019-07-16 LAB
BACTERIA UR CULT: NORMAL
SIGNIFICANT IND 70042: NORMAL
SITE SITE: NORMAL
SOURCE SOURCE: NORMAL

## 2019-08-02 ENCOUNTER — OFFICE VISIT (OUTPATIENT)
Dept: URGENT CARE | Facility: PHYSICIAN GROUP | Age: 29
End: 2019-08-02
Payer: COMMERCIAL

## 2019-08-02 VITALS
OXYGEN SATURATION: 97 % | DIASTOLIC BLOOD PRESSURE: 76 MMHG | TEMPERATURE: 98.6 F | SYSTOLIC BLOOD PRESSURE: 108 MMHG | HEART RATE: 84 BPM | RESPIRATION RATE: 16 BRPM

## 2019-08-02 DIAGNOSIS — J01.00 ACUTE MAXILLARY SINUSITIS, RECURRENCE NOT SPECIFIED: ICD-10-CM

## 2019-08-02 PROCEDURE — 99214 OFFICE O/P EST MOD 30 MIN: CPT | Performed by: FAMILY MEDICINE

## 2019-08-02 RX ORDER — FLUTICASONE PROPIONATE 50 MCG
1 SPRAY, SUSPENSION (ML) NASAL DAILY
Qty: 1 BOTTLE | Refills: 0 | Status: SHIPPED | OUTPATIENT
Start: 2019-08-02 | End: 2019-11-05

## 2019-08-02 RX ORDER — AMOXICILLIN AND CLAVULANATE POTASSIUM 875; 125 MG/1; MG/1
1 TABLET, FILM COATED ORAL 2 TIMES DAILY
Qty: 20 TAB | Refills: 0 | Status: SHIPPED | OUTPATIENT
Start: 2019-08-02 | End: 2019-08-12

## 2019-08-02 NOTE — PROGRESS NOTES
"Chief Complaint   Patient presents with   • Sinus Problem     poss sinus infection x8-9 days         Sinusitis  This is a new problem. The current episode started in the past 7 days. The problem has been gradually worsening since onset. There has been no fever. Associated symptoms include congestion, coughing, headaches and sinus pressure. Pertinent negatives include no sneezing or sore throat. Past treatments include nothing.     Social History     Tobacco Use   • Smoking status: Never Smoker   • Smokeless tobacco: Never Used   Substance Use Topics   • Alcohol use: Yes     Alcohol/week: 0.0 oz     Comment: rarely   • Drug use: No         Current Outpatient Medications on File Prior to Visit   Medication Sig Dispense Refill   • Nitrofurantoin Monohyd Macro (MACROBID PO) Take  by mouth.     • lidocaine viscous 2% (XYLOCAINE) 2 % Solution Take 5 mL by mouth as needed for Throat/Mouth Pain (q6hr PRN throat pain, ok to rinse and spit or swallow). 120 mL 0   • fluticasone (FLONASE) 50 MCG/ACT nasal spray Spray 2 Sprays in nose every day. 16 g 0   • Norethin Ace-Eth Estrad-FE (LOESTRIN 24 FE) 1-20 MG-MCG(24) Tab Take 1 tablet by mouth every day. 84 Tab 0     No current facility-administered medications on file prior to visit.            Allergies   Allergen Reactions   • Iodine Hives and Rash         • Morphine Rash     \"Skin burny rash.\"         Family history was reviewed and not pertinent      Review of Systems   Constitutional: Negative for fever, chills and malaise/fatigue.   Eyes: Negative for vision changes, d/c.    Respiratory: + for cough and sputum production.    Cardiovascular: Negative for chest pain and palpitations.   Gastrointestinal: Negative for nausea, vomiting, abdominal pain, diarrhea and constipation.   Genitourinary: Negative for dysuria, urgency and frequency.   Skin: Negative for rash or  itching.   Neurological: Negative for dizziness and tingling.    Psychiatric/Behavioral: Negative for " depression.   Hematologic/lymphatic - denies bruising or excessive bleeding  All other systems reviewed and are negative.           Objective:     /76 (BP Location: Right arm, Patient Position: Sitting, BP Cuff Size: Small adult)   Pulse 84   Temp 37 °C (98.6 °F) (Temporal)   Resp 16   SpO2 97%       Physical Exam   Constitutional: patient is oriented to person, place, and time. Patient appears well-developed and well-nourished.   HENT:   Head: Normocephalic and atraumatic.   Right Ear: Hearing, tympanic membrane and external ear normal.   Left Ear: Hearing, tympanic membrane and external ear normal.   Nose: Mucosal edema and rhinorrhea present.  . No epistaxis.  Left and right sinus tenderness noted  Mouth/Throat: Uvula is midline and mucous membranes are normal. Oropharyngeal exudate present. No posterior oropharyngeal edema or posterior oropharyngeal erythema.   Eyes: Conjunctivae and EOM are normal. Pupils are equal, round, and reactive to light. Left and right eyes - no discharge. No scleral icterus.   Neck: Normal range of motion. Neck supple. No JVD present. No tracheal deviation present. No thyromegaly present.   Cardiovascular: Normal rate, regular rhythm, normal heart sounds and intact distal pulses.    No murmur heard.  Pulmonary/Chest: Breath sounds normal. No respiratory distress.   no wheezes, rales.      Musculoskeletal: Normal range of motion.   no edema.   Lymphadenopathy:     There is positive  cervical adenopathy.   Neurological:   alert and oriented to person, place, and time.   Skin: Skin is warm and dry. No erythema.   Psychiatric:   normal mood and affect.  behavior is normal.   Nursing note and vitals reviewed.          Assessment/Plan:      1. Acute maxillary sinusitis, recurrence not specified     - amoxicillin-clavulanate (AUGMENTIN) 875-125 MG Tab; Take 1 Tab by mouth 2 times a day for 10 days.  Dispense: 20 Tab; Refill: 0  - fluticasone (FLONASE) 50 MCG/ACT nasal spray; Spray 1  Spray in nose every day.  Dispense: 1 Bottle; Refill: 0      Follow up in one week if no improvement, sooner if symptoms worsen.

## 2019-08-06 ENCOUNTER — OFFICE VISIT (OUTPATIENT)
Dept: URGENT CARE | Facility: PHYSICIAN GROUP | Age: 29
End: 2019-08-06
Payer: COMMERCIAL

## 2019-08-06 VITALS
BODY MASS INDEX: 27.58 KG/M2 | TEMPERATURE: 99.1 F | WEIGHT: 148 LBS | OXYGEN SATURATION: 97 % | SYSTOLIC BLOOD PRESSURE: 112 MMHG | RESPIRATION RATE: 20 BRPM | HEART RATE: 76 BPM | DIASTOLIC BLOOD PRESSURE: 82 MMHG

## 2019-08-06 DIAGNOSIS — R06.02 SOB (SHORTNESS OF BREATH): ICD-10-CM

## 2019-08-06 DIAGNOSIS — R05.9 COUGH: ICD-10-CM

## 2019-08-06 PROCEDURE — 99214 OFFICE O/P EST MOD 30 MIN: CPT | Performed by: PHYSICIAN ASSISTANT

## 2019-08-06 RX ORDER — IPRATROPIUM BROMIDE AND ALBUTEROL SULFATE 2.5; .5 MG/3ML; MG/3ML
3 SOLUTION RESPIRATORY (INHALATION) ONCE
Status: COMPLETED | OUTPATIENT
Start: 2019-08-06 | End: 2019-08-06

## 2019-08-06 RX ADMIN — IPRATROPIUM BROMIDE AND ALBUTEROL SULFATE 3 ML: 2.5; .5 SOLUTION RESPIRATORY (INHALATION) at 13:12

## 2019-08-06 ASSESSMENT — ENCOUNTER SYMPTOMS
WHEEZING: 1
HEADACHES: 0
DIZZINESS: 0
EYE PAIN: 0
MYALGIAS: 1
COUGH: 1
SINUS PAIN: 1
SHORTNESS OF BREATH: 1
PALPITATIONS: 0
NAUSEA: 0
VOMITING: 0
EYE REDNESS: 0
FEVER: 0
CHILLS: 1
SPUTUM PRODUCTION: 0
NECK PAIN: 0
SORE THROAT: 1

## 2019-08-06 NOTE — LETTER
August 6, 2019           To Whom it May Concern:    Flaca Nickerson was seen in my clinic on 8/6/2019. She may return to work on 8/7/2019.    If you have any questions or concerns, please don't hesitate to call.        Sincerely,           Britney Su P.A.-C.  Electronically Signed

## 2019-08-06 NOTE — PROGRESS NOTES
Subjective:      Cherelle Nickerson is a 29 y.o. female who presents with Cough (wheezing,breathing thru a tube feeling,dry cough)        29-year-old female complaining of nonproductive cough onset about 2 weeks ago.  Patient is now complaining of increasing difficulty breathing and states she feels as if she is breathing breathing through a straw.  History of asthma, although pain patient has not been treated since childhood.  Patient was seen here in urgent care 8/2/2019 and given a 10-day course of Augmentin for sinusitis.  She states her symptoms of sinus tenderness and congestion have mildly improved since.  Patient denies fevers, ear pain, sore throat, and nausea/vomiting.  Positive body aches and chills.          Review of Systems   Constitutional: Positive for chills and malaise/fatigue. Negative for fever.   HENT: Positive for congestion, sinus pain and sore throat. Negative for ear pain.    Eyes: Negative for pain and redness.   Respiratory: Positive for cough, shortness of breath and wheezing. Negative for sputum production.    Cardiovascular: Negative for chest pain, palpitations and leg swelling.   Gastrointestinal: Negative for nausea and vomiting.   Genitourinary: Negative for dysuria.   Musculoskeletal: Positive for myalgias. Negative for neck pain.   Skin: Negative for rash.   Neurological: Negative for dizziness and headaches.   Endo/Heme/Allergies: Negative for environmental allergies.     Past Medical History:   Diagnosis Date   • Abdominal mass 9/10/2013   • Adenomatous polyps     in colon   • ASTHMA     outgrown per mother   • Behcet's disease (HCC)    • Behcet's disease (HCC)    • Colon polyps 9/10/2013   • HYPOTENSION    • Migraine    • Mouth ulcer     abcess   • Palpitation    • Palpitations     SVT   • SVT (supraventricular tachycardia) (HCC)    • Typhoid fever      Current medications reviewed.  Social History     Tobacco Use   • Smoking status: Never Smoker   • Smokeless tobacco:  Never Used   Substance Use Topics   • Alcohol use: Yes     Alcohol/week: 0.0 oz     Comment: rarely        Objective:     /82 (BP Location: Right arm, Patient Position: Sitting, BP Cuff Size: Adult)   Pulse 76   Temp 37.3 °C (99.1 °F) (Temporal)   Resp 20   Wt 67.1 kg (148 lb)   SpO2 97%   BMI 27.58 kg/m²      Physical Exam   Constitutional: She is oriented to person, place, and time. She appears well-developed and well-nourished. No distress.   HENT:   Head: Normocephalic and atraumatic.   Nose: Right sinus exhibits maxillary sinus tenderness. Left sinus exhibits maxillary sinus tenderness.   Mouth/Throat: Uvula is midline. Mucous membranes are dry. Posterior oropharyngeal edema present. No oropharyngeal exudate.   Eyes: Conjunctivae and EOM are normal.   Neck: Normal range of motion. Neck supple.   Cardiovascular: Normal rate, regular rhythm and normal heart sounds.   Pulmonary/Chest: Effort normal. No respiratory distress. She has wheezes.   Abdominal: Soft.   Musculoskeletal: Normal range of motion.   Neurological: She is alert and oriented to person, place, and time.   Skin: Skin is warm and dry. Capillary refill takes less than 2 seconds.   Psychiatric: She has a normal mood and affect. Her behavior is normal.   Vitals reviewed.              Assessment/Plan:     1. Cough  ipratropium-albuterol (DUONEB) nebulizer solution    ipratropium-albuterol (COMBIVENT RESPIMAT)  MCG/ACT Aero Soln   2. SOB (shortness of breath)  ipratropium-albuterol (COMBIVENT RESPIMAT)  MCG/ACT Aero Soln     On re-examination, patient breathing is much improved. Sitting comfortably in bed, no wheeze heard on ascultation.   PT should follow up with PCP in 1-2 days for re-evaluation if symptoms have not improved.  Recommend further evaluation of pulmonary function secondary to previous diagnosed hx of asthma. Discussed red flags and reasons to return to UC or ED.  Pt and/or family verbalized understanding of  diagnosis and follow up instructions and was offered informational handout on diagnosis.  PT discharged.

## 2019-08-07 ENCOUNTER — TELEPHONE (OUTPATIENT)
Dept: URGENT CARE | Facility: PHYSICIAN GROUP | Age: 29
End: 2019-08-07

## 2019-08-07 NOTE — TELEPHONE ENCOUNTER
The pharmacy sent over a request to change the pts in [facility] from Combivent to Albuterol due to the cost.

## 2019-10-25 ENCOUNTER — APPOINTMENT (OUTPATIENT)
Dept: URGENT CARE | Facility: PHYSICIAN GROUP | Age: 29
End: 2019-10-25
Payer: COMMERCIAL

## 2019-11-05 ENCOUNTER — HOSPITAL ENCOUNTER (OUTPATIENT)
Facility: MEDICAL CENTER | Age: 29
End: 2019-11-05
Attending: NURSE PRACTITIONER
Payer: COMMERCIAL

## 2019-11-05 ENCOUNTER — OFFICE VISIT (OUTPATIENT)
Dept: URGENT CARE | Facility: PHYSICIAN GROUP | Age: 29
End: 2019-11-05
Payer: COMMERCIAL

## 2019-11-05 VITALS
HEIGHT: 61 IN | BODY MASS INDEX: 25.49 KG/M2 | DIASTOLIC BLOOD PRESSURE: 84 MMHG | OXYGEN SATURATION: 97 % | RESPIRATION RATE: 16 BRPM | TEMPERATURE: 97.9 F | SYSTOLIC BLOOD PRESSURE: 114 MMHG | HEART RATE: 90 BPM | WEIGHT: 135 LBS

## 2019-11-05 DIAGNOSIS — N89.8 VAGINAL DISCHARGE: ICD-10-CM

## 2019-11-05 LAB
APPEARANCE UR: NORMAL
BILIRUB UR STRIP-MCNC: NORMAL MG/DL
COLOR UR AUTO: NORMAL
GLUCOSE UR STRIP.AUTO-MCNC: NORMAL MG/DL
KETONES UR STRIP.AUTO-MCNC: NORMAL MG/DL
LEUKOCYTE ESTERASE UR QL STRIP.AUTO: NORMAL
NITRITE UR QL STRIP.AUTO: NORMAL
PH UR STRIP.AUTO: 5.5 [PH] (ref 5–8)
PROT UR QL STRIP: NORMAL MG/DL
RBC UR QL AUTO: NORMAL
SP GR UR STRIP.AUTO: 1.03
UROBILINOGEN UR STRIP-MCNC: 0.2 MG/DL

## 2019-11-05 PROCEDURE — 87591 N.GONORRHOEAE DNA AMP PROB: CPT

## 2019-11-05 PROCEDURE — 99214 OFFICE O/P EST MOD 30 MIN: CPT | Performed by: NURSE PRACTITIONER

## 2019-11-05 PROCEDURE — 81002 URINALYSIS NONAUTO W/O SCOPE: CPT | Performed by: NURSE PRACTITIONER

## 2019-11-05 PROCEDURE — 87491 CHLMYD TRACH DNA AMP PROBE: CPT

## 2019-11-05 PROCEDURE — 87480 CANDIDA DNA DIR PROBE: CPT

## 2019-11-05 PROCEDURE — 87086 URINE CULTURE/COLONY COUNT: CPT

## 2019-11-05 PROCEDURE — 87660 TRICHOMONAS VAGIN DIR PROBE: CPT

## 2019-11-05 PROCEDURE — 87510 GARDNER VAG DNA DIR PROBE: CPT

## 2019-11-05 RX ORDER — NORETHINDRONE ACETATE/ETHINYL ESTRADIOL AND FERROUS FUMARATE 1MG-20(21)
KIT ORAL
Refills: 11 | COMMUNITY
Start: 2019-10-02 | End: 2019-11-05

## 2019-11-06 LAB
AMBIGUOUS DTTM AMBI4: NORMAL
C TRACH DNA SPEC QL NAA+PROBE: NEGATIVE
CANDIDA DNA VAG QL PROBE+SIG AMP: NEGATIVE
G VAGINALIS DNA VAG QL PROBE+SIG AMP: NEGATIVE
N GONORRHOEA DNA SPEC QL NAA+PROBE: NEGATIVE
SIGNIFICANT IND 70042: NORMAL
SITE SITE: NORMAL
SOURCE SOURCE: NORMAL
SPECIMEN SOURCE: NORMAL
T VAGINALIS DNA VAG QL PROBE+SIG AMP: NEGATIVE

## 2019-11-06 NOTE — PROGRESS NOTES
"Subjective:   Flaca Nickerson is a 29 y.o. female who presents for Vaginal Itching        Vaginitis   The patient's primary symptoms include genital itching and vaginal discharge. This is a new problem. The current episode started in the past 7 days. The problem occurs intermittently. The problem has been waxing and waning. The pain is moderate. She is not pregnant. Pertinent negatives include no abdominal pain, chills, constipation, diarrhea, dysuria, fever, flank pain, frequency, hematuria, nausea, painful intercourse, urgency or vomiting. The vaginal discharge was milky and yellow. There has been no bleeding. Treatments tried: Monistat. The treatment provided no relief. She is sexually active. It is possible that her partner has an STD.        Review of Systems   Constitutional: Negative for chills and fever.   Eyes: Negative.    Respiratory: Negative.  Negative for shortness of breath and wheezing.    Cardiovascular: Negative.  Negative for chest pain.   Gastrointestinal: Negative for abdominal pain, constipation, diarrhea, nausea and vomiting.   Genitourinary: Positive for vaginal discharge. Negative for dysuria, flank pain, frequency, hematuria and urgency.        Vaginal discharge   Skin: Negative.      Patient's PMH, SocHx, SurgHx, FamHx, Drug allergies and medications reviewed.     Objective:   /84   Pulse 90   Temp 36.6 °C (97.9 °F)   Resp 16   Ht 1.56 m (5' 1.42\")   Wt 61.2 kg (135 lb)   LMP 10/21/2019   SpO2 97%   BMI 25.16 kg/m²   Physical Exam  Vitals signs reviewed.   Constitutional:       Appearance: She is well-developed.   HENT:      Right Ear: Hearing normal.      Left Ear: Hearing normal.   Eyes:      Conjunctiva/sclera: Conjunctivae normal.      Pupils: Pupils are equal, round, and reactive to light.   Cardiovascular:      Rate and Rhythm: Normal rate and regular rhythm.      Heart sounds: Normal heart sounds. No murmur.   Pulmonary:      Effort: Pulmonary effort is " normal. No respiratory distress.      Breath sounds: Normal breath sounds.   Abdominal:      General: Bowel sounds are normal. There is no distension.      Palpations: Abdomen is soft.      Tenderness: There is tenderness in the suprapubic area.   Genitourinary:     Comments: Patient declines  and prefers vaginal self swab.  Skin:     General: Skin is warm and dry.      Capillary Refill: Capillary refill takes less than 2 seconds.   Neurological:      Mental Status: She is alert and oriented to person, place, and time.   Psychiatric:         Behavior: Behavior normal.         Thought Content: Thought content normal.         Judgment: Judgment normal.           Assessment/Plan:   Assessment    1. Vaginal discharge  - VAGINAL PATHOGENS DNA PANEL; Future  - CHLAMYDIA/GC PCR URINE OR SWAB; Future  - POCT Urinalysis  - URINE CULTURE(NEW); Future    UA with small leuks - will send culture.   Will call with results once available.    Differential diagnosis, natural history, supportive care, and indications for immediate follow-up discussed.     **Please note that all invasive procedures during this visit were performed by myself and/or the Medical Assistant under the supervision of the PA or MD in office**

## 2019-11-07 ASSESSMENT — ENCOUNTER SYMPTOMS
VAGINITIS: 1
FEVER: 0
SHORTNESS OF BREATH: 0
WHEEZING: 0
ABDOMINAL PAIN: 0
CONSTIPATION: 0
EYES NEGATIVE: 1
VOMITING: 0
NAUSEA: 0
CHILLS: 0
FLANK PAIN: 0
DIARRHEA: 0
CARDIOVASCULAR NEGATIVE: 1
RESPIRATORY NEGATIVE: 1

## 2019-11-08 LAB
BACTERIA UR CULT: NORMAL
SIGNIFICANT IND 70042: NORMAL
SITE SITE: NORMAL
SOURCE SOURCE: NORMAL

## 2020-01-13 ENCOUNTER — APPOINTMENT (OUTPATIENT)
Dept: URGENT CARE | Facility: PHYSICIAN GROUP | Age: 30
End: 2020-01-13
Payer: COMMERCIAL

## 2020-01-13 ENCOUNTER — HOSPITAL ENCOUNTER (EMERGENCY)
Facility: MEDICAL CENTER | Age: 30
End: 2020-01-13
Attending: EMERGENCY MEDICINE
Payer: COMMERCIAL

## 2020-01-13 VITALS
TEMPERATURE: 97.6 F | SYSTOLIC BLOOD PRESSURE: 118 MMHG | OXYGEN SATURATION: 99 % | WEIGHT: 134.48 LBS | HEIGHT: 61 IN | BODY MASS INDEX: 25.39 KG/M2 | DIASTOLIC BLOOD PRESSURE: 86 MMHG | HEART RATE: 89 BPM | RESPIRATION RATE: 16 BRPM

## 2020-01-13 DIAGNOSIS — R22.0 FACIAL SWELLING: ICD-10-CM

## 2020-01-13 DIAGNOSIS — J01.00 ACUTE NON-RECURRENT MAXILLARY SINUSITIS: ICD-10-CM

## 2020-01-13 PROCEDURE — 99283 EMERGENCY DEPT VISIT LOW MDM: CPT

## 2020-01-13 RX ORDER — AMOXICILLIN AND CLAVULANATE POTASSIUM 875; 125 MG/1; MG/1
1 TABLET, FILM COATED ORAL 2 TIMES DAILY
Qty: 10 TAB | Refills: 0 | Status: SHIPPED
Start: 2020-01-13 | End: 2020-01-16

## 2020-01-13 NOTE — ED TRIAGE NOTES
Flaca Nickerson presents to triage reporting fever, body aches x1 week. Pt awoke this AM with facial swelling at 0700-- Pt took approx 70mg benadryl liquid PTA and swelling has subsided. No swelling noted in triage.     Pt also states that her mother had extra amoxicillin at her house and gave her that,  she has been taking it x1 day, unknown dose.    Pt speaking in full sentences, NAD noted. Pt educated of triage process, placed back in waiting area pending room assignment.       
none

## 2020-01-13 NOTE — DISCHARGE INSTRUCTIONS
Use over-the-counter ibuprofen for muscle aches and fevers.  Use over-the-counter Sudafed, Afrin, steroid nasal spray for your symptoms.  Return with worsening swelling or any other concerns.

## 2020-01-13 NOTE — ED PROVIDER NOTES
ED Provider Note    Scribed for Jin Nj M.D. by Parrish Jhaveri. 1/13/2020  11:52 AM    Primary care provider: DEVANTE Linares  Means of arrival: Walk-in  History obtained from: Patient  History limited by: None    CHIEF COMPLAINT  Chief Complaint   Patient presents with   • Facial Swelling       HPI  Flaca Nickerson is a 29 y.o. female who presents to the Emergency Department complaining of fever, body aches, diaphoresis, and congestion that began about nine days ago. Patient has been taking regular Tylenol and aspirin.  Two days ago she woke up with left sided facial swelling, eye swelling, and eye pain and pressure. She denies any itching. Her mother gave her some leftover amoxicillin BID two days ago. She has taken a total of five doses. The facial swelling and pain was present again this morning. Per nursing note, she also took approximately 70mg benadryl liquid PTA. She last noticed a tactile fever and chills yesterday evening. She denies sore throat, ear pain, shortness of breath, cough, chest pain, hematuria, or dysuria. There is no chance of pregnancy.    REVIEW OF SYSTEMS  Pertinent positives include fever, body aches, diaphoresis, congestion,  left sided facial swelling, eye swelling, and eye pain and pressure. Pertinent negatives include no sore throat, ear pain, shortness of breath, cough, chest pain, hematuria, or dysuria.      PAST MEDICAL HISTORY   has a past medical history of Abdominal mass (9/10/2013), Adenomatous polyps, ASTHMA, Behcet's disease ([facility]), Behcet's disease (HCC), Colon polyps (9/10/2013), HYPOTENSION, Migraine, Mouth ulcer, Palpitation, Palpitations, SVT (supraventricular tachycardia) ([facility]), and Typhoid fever.    SURGICAL HISTORY   has a past surgical history that includes tonsillectomy (2004) and appendectomy.    SOCIAL HISTORY  Social History     Tobacco Use   • Smoking status: Never Smoker   • Smokeless tobacco: Never Used   Substance Use Topics  "  • Alcohol use: Yes     Alcohol/week: 0.0 oz     Comment: rarely   • Drug use: No      Social History     Substance and Sexual Activity   Drug Use No       FAMILY HISTORY  Family History   Problem Relation Age of Onset   • Heart Disease Father    • Thyroid Mother    • Diabetes Maternal Grandmother    • Hypertension Maternal Grandmother    • Hypertension Maternal Grandfather    • Heart Disease Paternal Grandfather    • Cancer Maternal Aunt 35        breast ca       CURRENT MEDICATIONS  Home Medications     Reviewed by Schuyler B Collett, R.N. (Registered Nurse) on 01/13/20 at 1124  Med List Status: Partial   Medication Last Dose Status   ipratropium-albuterol (COMBIVENT RESPIMAT)  MCG/ACT Aero Soln  Active   Norethin Ace-Eth Estrad-FE (LOESTRIN 24 FE) 1-20 MG-MCG(24) Tab  Active                ALLERGIES  Allergies   Allergen Reactions   • Iodine Hives and Rash         • Morphine Rash     \"Skin burny rash.\"       PHYSICAL EXAM  VITAL SIGNS: /80   Pulse 98   Temp 37.1 °C (98.8 °F) (Temporal)   Resp 16   Ht 1.549 m (5' 1\")   Wt 61 kg (134 lb 7.7 oz)   SpO2 99%   BMI 25.41 kg/m²     Constitutional: Well developed, Well nourished, Mild distress, Non-toxic appearance.   HENT: Normocephalic, Atraumatic.  Oropharynx moist. Reviewed picture and she had left sided facial edema throughout without erythema. Fluid behind bilateral TMs. Tenderness over the left maxillary sinus  Eyes: PERRL, EOMI, Conjunctiva normal, No discharge.   CV: Good pulses  Thorax & Lungs: No respiratory distress.   Skin: Warm, Dry, No erythema, No rash.    Musculoskeletal: No major deformities noted.   Neurologic: Awake, alert. Moves all extremities spontaneously.  Psychiatric: Affect normal, Mood normal.    LABS  Results for orders placed or performed during the hospital encounter of 11/05/19   VAGINAL PATHOGENS DNA PANEL   Result Value Ref Range    Candida species DNA Probe Negative Negative    Trichamonas vaginalis DNA Probe " Negative Negative    Gardnerella vaginalis DNA Probe Negative Negative   CHLAMYDIA/GC PCR URINE OR SWAB   Result Value Ref Range    C. trachomatis by PCR Negative Negative    N. gonorrhoeae by PCR Negative Negative    Source Genital    URINE CULTURE(NEW)   Result Value Ref Range    Significant Indicator NEG     Source UR     Site -     Culture Result Mixed skin javier ,000 cfu/mL    AMBIGUOUS DATE/TIME   Result Value Ref Range    Significant Indicator .     Source UR     Site -     Ambiguous Date/Time       This specimen was received from your office without a  collection date and/or time. Collection date and/or time  defaulted to receipt date and/or time.         COURSE & MEDICAL DECISION MAKING  Nursing notes, VS, PMSFHx reviewed in chart.    11:52 AM - Patient seen and examined at bedside. I discussed her above findings and plans for discharge with a prescription for Augmentin. She was given a referral to her PCP and instructed to return to the ED if her symptoms worsen. Patient understands and agrees.    Decision Making:    The patient presents today with signs and symptoms consistent with a viral upper respiratory infection.  They have a normal pulse oximetry on room air and a normal pulmonary exam.  Therefore, I feel that the likelihood of pneumonia is low.  This patient does not demonstrate any clinical evidence of pneumonia, meningitis, appendicitis, or other acute medical emergency.  We discussed the pathophysiology and symptoms of bacterial sinusitis. She understands that sinus pain and congestion are also commonly associated with viral URIs. Overall, the patient is very well appearing. I feel that this patient would benefit from antibiotics at this time because of the swelling. I have recommended Tylenol and Ibuprofen as needed for fever. I asked her to discontinue aspirin. I stressed the importance of using steroid and Afrin nasal sprays for congestion and sinus inflammation.  I will give the  patient a prescription for Augmentin to cover her for bacterial sinusitis.    The patient will return for new or worsening symptoms and is stable at the time of discharge.    The patient is referred to a primary physician for blood pressure management, diabetic screening, and for all other preventative health concerns.    DISPOSITION:  Patient will be discharged home in stable condition.    FOLLOW UP:  [facility], Emergency Dept  [address]  [place] [id number] [phone]    If symptoms worsen    DEVANTE Linares  [address]  [facility] [address]  [phone]            OUTPATIENT MEDICATIONS:  Discharge Medication List as of 1/13/2020 12:20 PM      START taking these medications    Details   amoxicillin-clavulanate (AUGMENTIN) 875-125 MG Tab Take 1 Tab by mouth 2 times a day for 5 days., Disp-10 Tab, R-0, Normal           FINAL IMPRESSION  1. Facial swelling    2. Acute non-recurrent maxillary sinusitis          IParrish (Scribe), am scribing for, and in the presence of, Jin Nj M.D..    Electronically signed by: Parrish Jhaveri (Scribe), 1/13/2020    IJin M.D. personally performed the services described in this documentation, as scribed by Parrish Jhaveri in my presence, and it is both accurate and complete.    E    The note accurately reflects work and decisions made by me.  Jin Nj M.D.  1/13/2020  6:03 PM

## 2020-01-16 ENCOUNTER — OFFICE VISIT (OUTPATIENT)
Dept: URGENT CARE | Facility: PHYSICIAN GROUP | Age: 30
End: 2020-01-16
Payer: COMMERCIAL

## 2020-01-16 VITALS
RESPIRATION RATE: 18 BRPM | HEART RATE: 110 BPM | DIASTOLIC BLOOD PRESSURE: 80 MMHG | SYSTOLIC BLOOD PRESSURE: 120 MMHG | TEMPERATURE: 99.8 F | OXYGEN SATURATION: 97 %

## 2020-01-16 DIAGNOSIS — Z71.1 CONCERN ABOUT STD IN FEMALE WITHOUT DIAGNOSIS: ICD-10-CM

## 2020-01-16 DIAGNOSIS — J01.00 ACUTE NON-RECURRENT MAXILLARY SINUSITIS: ICD-10-CM

## 2020-01-16 PROCEDURE — 99214 OFFICE O/P EST MOD 30 MIN: CPT | Performed by: FAMILY MEDICINE

## 2020-01-16 RX ORDER — DOXYCYCLINE 100 MG/1
100 CAPSULE ORAL 2 TIMES DAILY
Qty: 10 CAP | Refills: 0 | Status: SHIPPED
Start: 2020-01-16 | End: 2020-01-16

## 2020-01-16 RX ORDER — DOXYCYCLINE HYCLATE 100 MG
100 TABLET ORAL 2 TIMES DAILY
Qty: 20 TAB | Refills: 0 | Status: SHIPPED | OUTPATIENT
Start: 2020-01-16 | End: 2020-01-26

## 2020-01-16 ASSESSMENT — ENCOUNTER SYMPTOMS
SINUS PRESSURE: 1
SORE THROAT: 0
COUGH: 0
SHORTNESS OF BREATH: 0
HEADACHES: 0

## 2020-01-17 ENCOUNTER — HOSPITAL ENCOUNTER (OUTPATIENT)
Dept: LAB | Facility: MEDICAL CENTER | Age: 30
End: 2020-01-17
Attending: FAMILY MEDICINE
Payer: COMMERCIAL

## 2020-01-17 DIAGNOSIS — J01.00 ACUTE NON-RECURRENT MAXILLARY SINUSITIS: ICD-10-CM

## 2020-01-17 DIAGNOSIS — Z71.1 CONCERN ABOUT STD IN FEMALE WITHOUT DIAGNOSIS: ICD-10-CM

## 2020-01-17 LAB — HETEROPH AB SER QL: POSITIVE

## 2020-01-17 PROCEDURE — 87389 HIV-1 AG W/HIV-1&-2 AB AG IA: CPT

## 2020-01-17 PROCEDURE — 86704 HEP B CORE ANTIBODY TOTAL: CPT

## 2020-01-17 PROCEDURE — 36415 COLL VENOUS BLD VENIPUNCTURE: CPT

## 2020-01-17 PROCEDURE — 86708 HEPATITIS A ANTIBODY: CPT

## 2020-01-17 PROCEDURE — 86780 TREPONEMA PALLIDUM: CPT

## 2020-01-17 PROCEDURE — 86694 HERPES SIMPLEX NES ANTBDY: CPT

## 2020-01-17 PROCEDURE — 80074 ACUTE HEPATITIS PANEL: CPT

## 2020-01-17 PROCEDURE — 86308 HETEROPHILE ANTIBODY SCREEN: CPT

## 2020-01-17 NOTE — PATIENT INSTRUCTIONS

## 2020-01-18 LAB
HAV IGM SERPL QL IA: NEGATIVE
HBV CORE AB SERPL QL IA: NEGATIVE
HBV CORE IGM SER QL: NEGATIVE
HBV SURFACE AG SER QL: NEGATIVE
HCV AB SER QL: NEGATIVE
HIV 1+2 AB+HIV1 P24 AG SERPL QL IA: NON REACTIVE
TREPONEMA PALLIDUM IGG+IGM AB [PRESENCE] IN SERUM OR PLASMA BY IMMUNOASSAY: NON REACTIVE

## 2020-01-19 LAB — HAV AB SER QL IA: POSITIVE

## 2020-01-21 LAB
HSV1+2 IGG SER IA-ACNC: >22.4 IV
HSV1+2 IGM SER IA-ACNC: 3.2 IV

## 2020-03-26 ENCOUNTER — EH NON-PROVIDER (OUTPATIENT)
Dept: OCCUPATIONAL MEDICINE | Facility: CLINIC | Age: 30
End: 2020-03-26

## 2020-03-26 PROCEDURE — 94375 RESPIRATORY FLOW VOLUME LOOP: CPT | Performed by: FAMILY MEDICINE

## 2020-04-22 ENCOUNTER — APPOINTMENT (OUTPATIENT)
Dept: LAB | Facility: MEDICAL CENTER | Age: 30
End: 2020-04-22
Attending: OBSTETRICS & GYNECOLOGY
Payer: COMMERCIAL

## 2020-04-23 ENCOUNTER — HOSPITAL ENCOUNTER (OUTPATIENT)
Dept: LAB | Facility: MEDICAL CENTER | Age: 30
End: 2020-04-23
Attending: OBSTETRICS & GYNECOLOGY
Payer: COMMERCIAL

## 2020-04-23 LAB — B-HCG SERPL-ACNC: 13 MIU/ML (ref 0–5)

## 2020-04-23 PROCEDURE — 36415 COLL VENOUS BLD VENIPUNCTURE: CPT

## 2020-04-23 PROCEDURE — 84702 CHORIONIC GONADOTROPIN TEST: CPT

## 2020-04-25 ENCOUNTER — HOSPITAL ENCOUNTER (OUTPATIENT)
Dept: LAB | Facility: MEDICAL CENTER | Age: 30
End: 2020-04-25
Attending: OBSTETRICS & GYNECOLOGY
Payer: COMMERCIAL

## 2020-04-25 LAB — B-HCG SERPL-ACNC: 6.3 MIU/ML (ref 0–5)

## 2020-04-25 PROCEDURE — 36415 COLL VENOUS BLD VENIPUNCTURE: CPT

## 2020-04-25 PROCEDURE — 84702 CHORIONIC GONADOTROPIN TEST: CPT

## 2020-05-05 ENCOUNTER — APPOINTMENT (OUTPATIENT)
Dept: RADIOLOGY | Facility: MEDICAL CENTER | Age: 30
End: 2020-05-05
Attending: EMERGENCY MEDICINE
Payer: COMMERCIAL

## 2020-05-05 ENCOUNTER — HOSPITAL ENCOUNTER (EMERGENCY)
Facility: MEDICAL CENTER | Age: 30
End: 2020-05-05
Attending: EMERGENCY MEDICINE
Payer: COMMERCIAL

## 2020-05-05 VITALS
HEIGHT: 61 IN | SYSTOLIC BLOOD PRESSURE: 122 MMHG | HEART RATE: 84 BPM | DIASTOLIC BLOOD PRESSURE: 68 MMHG | BODY MASS INDEX: 26.64 KG/M2 | OXYGEN SATURATION: 100 % | RESPIRATION RATE: 14 BRPM | WEIGHT: 141.09 LBS | TEMPERATURE: 98.5 F

## 2020-05-05 DIAGNOSIS — O03.9 COMPLETE MISCARRIAGE: ICD-10-CM

## 2020-05-05 DIAGNOSIS — R10.2 PELVIC PAIN: ICD-10-CM

## 2020-05-05 LAB
APPEARANCE UR: ABNORMAL
B-HCG SERPL-ACNC: <1 MIU/ML (ref 0–5)
BACTERIA #/AREA URNS HPF: ABNORMAL /HPF
BILIRUB UR QL STRIP.AUTO: NEGATIVE
COLOR UR: YELLOW
EPI CELLS #/AREA URNS HPF: ABNORMAL /HPF
GLUCOSE UR STRIP.AUTO-MCNC: NEGATIVE MG/DL
HYALINE CASTS #/AREA URNS LPF: ABNORMAL /LPF
KETONES UR STRIP.AUTO-MCNC: NEGATIVE MG/DL
LEUKOCYTE ESTERASE UR QL STRIP.AUTO: ABNORMAL
MICRO URNS: ABNORMAL
NITRITE UR QL STRIP.AUTO: NEGATIVE
PH UR STRIP.AUTO: 5 [PH] (ref 5–8)
PROT UR QL STRIP: NEGATIVE MG/DL
RBC # URNS HPF: ABNORMAL /HPF
RBC UR QL AUTO: NEGATIVE
SP GR UR STRIP.AUTO: 1.02
UROBILINOGEN UR STRIP.AUTO-MCNC: 0.2 MG/DL
WBC #/AREA URNS HPF: ABNORMAL /HPF

## 2020-05-05 PROCEDURE — 76801 OB US < 14 WKS SINGLE FETUS: CPT

## 2020-05-05 PROCEDURE — 36415 COLL VENOUS BLD VENIPUNCTURE: CPT | Mod: EDC

## 2020-05-05 PROCEDURE — 99284 EMERGENCY DEPT VISIT MOD MDM: CPT | Mod: EDC

## 2020-05-05 PROCEDURE — 84702 CHORIONIC GONADOTROPIN TEST: CPT | Mod: EDC

## 2020-05-05 PROCEDURE — 87086 URINE CULTURE/COLONY COUNT: CPT | Mod: EDC

## 2020-05-05 PROCEDURE — 81001 URINALYSIS AUTO W/SCOPE: CPT | Mod: EDC

## 2020-05-05 ASSESSMENT — FIBROSIS 4 INDEX: FIB4 SCORE: 0.41

## 2020-05-05 NOTE — ED NOTES
"First interaction with patient.  Assumed care of patient at this time.  Patient states that she had a positive pregnancy 3/22/2020.  Patient .   Patient has not seen her OBGYN, Grecia Syed from [facility], but has been in contact with her regarding this pregnancy.  Patient had a HCG done 3 weeks ago with a result of 13, \"and my OB said that wasn't quite where she wanted to see it, so she wanted me to get it repeated.  I had another one done 2 weeks ago and it was 6.  So she thinks something is wrong with this pregnancy.\"  She also states that she had brown vaginal discharge for approx 1 week, subsided 2 weeks ago.  Patient began spotting and RLQ pain starting today, endorses that she does have history of \"a 4cm complex ovarian cyst\" that did not require surgical intervention, \"she said to just keep an eye on it.\"    Gown provided.  Parent verbalizes understanding of NPO status.  Call light provided.  Chart up for ERP.      "

## 2020-05-05 NOTE — ED NOTES
Blood collected via venipuncture and sent to lab.  Patient informed of estimated lab result wait times, verbalized understanding.  Urine sent to lab.  Patient taken to ultrasound via wheelchair by ultrasound staff.  Patient leaves the department awake, alert, in no apparent distress.

## 2020-05-05 NOTE — ED PROVIDER NOTES
ED Provider Note    CHIEF COMPLAINT  Chief Complaint   Patient presents with   • Urinary Pain   • Vaginal Bleeding     spotting, positive home pregnancy 3/22/20,    • RLQ Pain     patient reports hx of right ovarian cyst       HPI  Flaca Nickerson is a 30 y.o. female who presents for evaluation of right-sided pelvic pain, resolved vaginal bleeding all in the setting of early pregnancy.  She states that she had a positive pregnancy test about 6 weeks ago, went to her gynecologist where she had serial hCGs obtained revealing a very low and decreasing hCG quantitative test, 13-6.  She had some bleeding afterwards.  Since then she is developed right-sided pelvic pain is been increasingly severe.  The pain is worse when she urinates, constant and nonradiating.  G2, P1, unremarkable pregnancy in the past.  Has been diagnosed with a large complex right-sided ovarian cyst about a year ago, status post appendectomy.    REVIEW OF SYSTEMS  Negative for fever, rash, chest pain, dyspnea, headache. All other systems are negative.     PAST MEDICAL HISTORY  Past Medical History:   Diagnosis Date   • Abdominal mass 9/10/2013   • Adenomatous polyps     in colon   • ASTHMA     outgrown per mother   • Behcet's disease (HCC)    • Behcet's disease (HCC)    • Colon polyps 9/10/2013   • HYPOTENSION    • Migraine    • Mouth ulcer     abcess   • Palpitation    • Palpitations     SVT   • SVT (supraventricular tachycardia) (HCC)    • Typhoid fever        FAMILY HISTORY  Family History   Problem Relation Age of Onset   • Heart Disease Father    • Thyroid Mother    • Diabetes Maternal Grandmother    • Hypertension Maternal Grandmother    • Hypertension Maternal Grandfather    • Heart Disease Paternal Grandfather    • Cancer Maternal Aunt 35        breast ca       SOCIAL HISTORY  Social History     Tobacco Use   • Smoking status: Never Smoker   • Smokeless tobacco: Never Used   Substance Use Topics   • Alcohol use: Yes      "Alcohol/week: 0.0 oz     Comment: rarely   • Drug use: No       SURGICAL HISTORY  Past Surgical History:   Procedure Laterality Date   • TONSILLECTOMY  2004   • APPENDECTOMY         CURRENT MEDICATIONS  I personally reviewed the medication list in the charting documentation.     ALLERGIES  Allergies   Allergen Reactions   • Iodine Hives and Rash         • Morphine Rash     \"Skin burny rash.\"       MEDICAL RECORD  I have reviewed patient's medical record and pertinent results are listed above.      PHYSICAL EXAM  VITAL SIGNS: /78   Pulse (!) 112   Temp 36.1 °C (96.9 °F) (Temporal)   Resp 12   Ht 1.549 m (5' 1\")   Wt 64 kg (141 lb 1.5 oz)   LMP 10/21/2019   SpO2 98%   BMI 26.66 kg/m²    Constitutional: Well appearing patient in no acute distress.  Not toxic, nor ill in appearance.  HENT: Mucus membranes moist.    Eyes: No scleral icterus. Normal conjunctiva   Neck: Supple, comfortable, nonpainful range of motion.   Cardiovascular: Minimal tachycardia, regular.   Thorax & Lungs: Clear to auscultation bilaterally  Abdomen: Soft, nondistended, tenderness overlying the right side of the pelvis without rebound or guarding.  Skin: Warm, dry. No rash appreciated  Neurologic: Alert & oriented. No focal deficits observed.   Psychiatric: Normal affect appropriate for the clinical situation.    DIAGNOSTIC STUDIES / PROCEDURES    LABS/EKGs  Results for orders placed or performed during the hospital encounter of 05/05/20   URINALYSIS CULTURE, IF INDICATED   Result Value Ref Range    Color Yellow     Character Cloudy (A)     Specific Gravity 1.016 <1.035    Ph 5.0 5.0 - 8.0    Glucose Negative Negative mg/dL    Ketones Negative Negative mg/dL    Protein Negative Negative mg/dL    Bilirubin Negative Negative    Urobilinogen, Urine 0.2 Negative    Nitrite Negative Negative    Leukocyte Esterase Small (A) Negative    Occult Blood Negative Negative    Micro Urine Req Microscopic    HCG QUANTITATIVE SERUM   Result Value " Ref Range    Bhcg <1.0 0.0 - 5.0 mIU/mL   URINE MICROSCOPIC (W/UA)   Result Value Ref Range    WBC 10-20 (A) /hpf    RBC 0-2 /hpf    Bacteria Few (A) None /hpf    Epithelial Cells Few /hpf    Hyaline Cast 6-10 (A) /lpf        RADIOLOGY  US-OB 1ST TRIMESTER WITH TRANSVAGINAL (COMBO)   Final Result      1.  No evidence of intrauterine pregnancy identified at this time.      2.  No adnexal mass or free fluid identified.            COURSE & MEDICAL DECISION MAKING  I have reviewed any medical record information, laboratory studies and radiographic results as noted above.  Differential diagnoses includes: Ovarian cyst, ovarian torsion, tubo-ovarian abscess, ectopic pregnancy, miscarriage, UTI    Encounter Summary: This is a 30 y.o. female with right-sided pelvic pain, recent diagnosis of pregnancy about 6 weeks ago but had downward trending hCGs and bleeding so that likely represented a miscarriage although now with her right-sided pelvic pain this is concerning for possible ectopic pregnancy, her ECG will be trended and an ultrasound will be obtained.  Will also check urinalysis, she will be reevaluated ------ hCG is undetectable confirming that the earlier severe and she has a completed miscarriage.  Her ultrasound is within normal limits, no adnexal masses, the ovaries appear normal.  At this point of no clear etiology of her pelvic pain, she is not pregnant, urinalysis with no indication of obvious infection, she is stable and appropriate for discharge with outpatient follow-up      DISPOSITION: Discharged home in stable condition      FINAL IMPRESSION  1. Pelvic pain    2. Complete miscarriage           This dictation was created using voice recognition software. The accuracy of the dictation is limited to the abilities of the software. I expect there may be some errors of grammar and possibly content. The nursing notes were reviewed and certain aspects of this information were incorporated into this  note.    Electronically signed by: Mingo Guzman M.D., 5/5/2020 3:38 PM

## 2020-05-05 NOTE — ED TRIAGE NOTES
".  Chief Complaint   Patient presents with   • Urinary Pain   • Vaginal Bleeding     spotting, positive home pregnancy 3/22/20,    • RLQ Pain     patient reports hx of right ovarian cyst     ./78   Pulse (!) 112   Temp 36.1 °C (96.9 °F) (Temporal)   Resp 12   Ht 1.549 m (5' 1\")   Wt 64 kg (141 lb 1.5 oz)   LMP 10/21/2019   SpO2 98%   BMI 26.66 kg/m²     Ambulatory to triage with above complaints, wearing mask, denies respiratory complaints, educated on triage process, given urine specimen cup and clean catch instructions, told to inform staff of any changes in condition.   "

## 2020-05-06 NOTE — ED NOTES
"Flaca Nickerson has been discharged from the Emergency Room.    Discharge instructions, which include signs and symptoms to monitor patient for, hydration and hand hygiene importance, as well as detailed information regarding pelvic pain and complete miscarriage provided.  This RN also encouraged a follow- up appointment to be made with patient's PCP and OBGYN.  All questions and concerns addressed at this time.       Patient leaves ER in no apparent distress, is awake, alert, pink, interactive and age appropriate. Patient is aware of the need to return to the ER for any concerns or changes in current condition.    /68   Pulse 84   Temp 36.9 °C (98.5 °F) (Temporal)   Resp 14   Ht 1.549 m (5' 1\")   Wt 64 kg (141 lb 1.5 oz)   LMP 10/21/2019   SpO2 100%   BMI 26.66 kg/m²       "

## 2020-05-07 LAB
BACTERIA UR CULT: NORMAL
SIGNIFICANT IND 70042: NORMAL
SITE SITE: NORMAL
SOURCE SOURCE: NORMAL

## 2020-05-08 ENCOUNTER — HOSPITAL ENCOUNTER (OUTPATIENT)
Facility: MEDICAL CENTER | Age: 30
End: 2020-05-08
Attending: NURSE PRACTITIONER
Payer: COMMERCIAL

## 2020-05-08 ENCOUNTER — OFFICE VISIT (OUTPATIENT)
Dept: URGENT CARE | Facility: CLINIC | Age: 30
End: 2020-05-08
Payer: COMMERCIAL

## 2020-05-08 VITALS
DIASTOLIC BLOOD PRESSURE: 70 MMHG | SYSTOLIC BLOOD PRESSURE: 100 MMHG | BODY MASS INDEX: 26.13 KG/M2 | RESPIRATION RATE: 12 BRPM | OXYGEN SATURATION: 97 % | TEMPERATURE: 98.6 F | WEIGHT: 138.4 LBS | HEIGHT: 61 IN | HEART RATE: 92 BPM

## 2020-05-08 DIAGNOSIS — N89.8 VAGINAL DISCHARGE: ICD-10-CM

## 2020-05-08 DIAGNOSIS — N89.8 VAGINAL ITCHING: ICD-10-CM

## 2020-05-08 LAB
APPEARANCE UR: CLEAR
BILIRUB UR STRIP-MCNC: NEGATIVE MG/DL
COLOR UR AUTO: YELLOW
GLUCOSE UR STRIP.AUTO-MCNC: NEGATIVE MG/DL
INT CON NEG: NORMAL
INT CON POS: NORMAL
KETONES UR STRIP.AUTO-MCNC: NEGATIVE MG/DL
LEUKOCYTE ESTERASE UR QL STRIP.AUTO: NEGATIVE
NITRITE UR QL STRIP.AUTO: NEGATIVE
PH UR STRIP.AUTO: 5 [PH] (ref 5–8)
POC URINE PREGNANCY TEST: NEGATIVE
PROT UR QL STRIP: NEGATIVE MG/DL
RBC UR QL AUTO: NEGATIVE
SP GR UR STRIP.AUTO: 1
UROBILINOGEN UR STRIP-MCNC: 0.2 MG/DL

## 2020-05-08 PROCEDURE — 81025 URINE PREGNANCY TEST: CPT | Performed by: NURSE PRACTITIONER

## 2020-05-08 PROCEDURE — 87591 N.GONORRHOEAE DNA AMP PROB: CPT

## 2020-05-08 PROCEDURE — 81002 URINALYSIS NONAUTO W/O SCOPE: CPT | Performed by: NURSE PRACTITIONER

## 2020-05-08 PROCEDURE — 87660 TRICHOMONAS VAGIN DIR PROBE: CPT

## 2020-05-08 PROCEDURE — 99214 OFFICE O/P EST MOD 30 MIN: CPT | Performed by: NURSE PRACTITIONER

## 2020-05-08 PROCEDURE — 87510 GARDNER VAG DNA DIR PROBE: CPT

## 2020-05-08 PROCEDURE — 87480 CANDIDA DNA DIR PROBE: CPT

## 2020-05-08 PROCEDURE — 87491 CHLMYD TRACH DNA AMP PROBE: CPT

## 2020-05-08 ASSESSMENT — ENCOUNTER SYMPTOMS
FEVER: 0
CHILLS: 0

## 2020-05-08 ASSESSMENT — FIBROSIS 4 INDEX: FIB4 SCORE: 0.41

## 2020-05-08 NOTE — PROGRESS NOTES
Subjective:      Cherelle Nickerson is a 30 y.o. female who presents with Vaginal Itching (discharge started yesterday)    Past Medical History:   Diagnosis Date   • Abdominal mass 9/10/2013   • Adenomatous polyps     in colon   • ASTHMA     outgrown per mother   • Behcet's disease (HCC)    • Behcet's disease (HCC)    • Colon polyps 9/10/2013   • HYPOTENSION    • Migraine    • Mouth ulcer     abcess   • Palpitation    • Palpitations     SVT   • SVT (supraventricular tachycardia) (HCC)    • Typhoid fever      Social History     Socioeconomic History   • Marital status: Single     Spouse name: Not on file   • Number of children: Not on file   • Years of education: Not on file   • Highest education level: Not on file   Occupational History   • Not on file   Social Needs   • Financial resource strain: Not on file   • Food insecurity     Worry: Not on file     Inability: Not on file   • Transportation needs     Medical: Not on file     Non-medical: Not on file   Tobacco Use   • Smoking status: Never Smoker   • Smokeless tobacco: Never Used   Substance and Sexual Activity   • Alcohol use: Yes     Alcohol/week: 0.0 oz     Comment: rarely   • Drug use: No   • Sexual activity: Yes     Partners: Male     Birth control/protection: Condom, Pill     Comment: single   Lifestyle   • Physical activity     Days per week: Not on file     Minutes per session: Not on file   • Stress: Not on file   Relationships   • Social connections     Talks on phone: Not on file     Gets together: Not on file     Attends Orthodox service: Not on file     Active member of club or organization: Not on file     Attends meetings of clubs or organizations: Not on file     Relationship status: Not on file   • Intimate partner violence     Fear of current or ex partner: Not on file     Emotionally abused: Not on file     Physically abused: Not on file     Forced sexual activity: Not on file   Other Topics Concern   • Not on file   Social History  "Narrative   • Not on file     Family History   Problem Relation Age of Onset   • Heart Disease Father    • Thyroid Mother    • Diabetes Maternal Grandmother    • Hypertension Maternal Grandmother    • Hypertension Maternal Grandfather    • Heart Disease Paternal Grandfather    • Cancer Maternal Aunt 35        breast ca       Allergies: Iodine and Morphine    Patient is a 30-year-old female who presents today with complaint of vaginal discharge and itching.  3 days ago she was seen in ER for pelvic pain.  Ultrasound was negative.  Quantitative hCG was negative after being elevated at previous visits.  Patient is aware that her hCG was trending downward before she went to the ER.  Patient states she is also concerned because she thinks she can feel a small mass on her cervix.  States she is still having pelvic pain.          Other   This is a new problem. The current episode started yesterday. The problem occurs constantly. Pertinent negatives include no chills or fever. Nothing aggravates the symptoms. She has tried nothing for the symptoms. The treatment provided no relief.       Review of Systems   Constitutional: Negative for chills and fever.   Genitourinary:        Vaginal discharge   All other systems reviewed and are negative.         Objective:     /70 (BP Location: Left arm, Patient Position: Sitting, BP Cuff Size: Adult)   Pulse 92   Temp 37 °C (98.6 °F)   Resp 12   Ht 1.549 m (5' 1\")   Wt 62.8 kg (138 lb 6.4 oz)   LMP 10/21/2019   SpO2 97%   BMI 26.15 kg/m²      Physical Exam  Vitals signs reviewed.   Constitutional:       Appearance: Normal appearance.   Skin:     General: Skin is warm and dry.      Capillary Refill: Capillary refill takes less than 2 seconds.   Neurological:      Mental Status: She is alert and oriented to person, place, and time.   Psychiatric:         Mood and Affect: Mood normal.         Behavior: Behavior normal.         Thought Content: Thought content normal.         " Judgment: Judgment normal.     On vaginal exam, cervix appears to be within normal limits with clear mucus drainage.  No other discharge.  No lesions noted intravaginally, and no obvious lesions noted on the cervix.  Cervix is nonfriable on exam.    UA: Negative blood, negative leukocytes, negative nitrates    Urine hCG: Negative          Assessment/Plan:   Vaginal itching  Vaginal discharge    Vaginal swabs obtained for vaginal pathogens and gonorrhea/chlamydia  ER precautions for increasing pain  Keep follow-up appointment with gynecologist    There are no diagnoses linked to this encounter.

## 2020-05-09 ENCOUNTER — TELEPHONE (OUTPATIENT)
Dept: URGENT CARE | Facility: CLINIC | Age: 30
End: 2020-05-09

## 2020-05-09 NOTE — TELEPHONE ENCOUNTER
Attempted to notify patient by phone of lab results for vaginal pathogens.  No answer.  Left detailed voice message with results and advised patient that results for gonorrhea and chlamydia are pending.  We will notify her upon receiving this.  Requested callback for any further questions or concerns.  Patient sent note via [employer] as well

## 2020-05-12 ENCOUNTER — TELEMEDICINE (OUTPATIENT)
Dept: MEDICAL GROUP | Facility: MEDICAL CENTER | Age: 30
End: 2020-05-12
Payer: COMMERCIAL

## 2020-05-12 ENCOUNTER — TELEPHONE (OUTPATIENT)
Dept: HEALTH INFORMATION MANAGEMENT | Facility: OTHER | Age: 30
End: 2020-05-12

## 2020-05-12 VITALS — BODY MASS INDEX: 26.15 KG/M2 | HEIGHT: 61 IN

## 2020-05-12 DIAGNOSIS — J02.9 PHARYNGITIS, UNSPECIFIED ETIOLOGY: ICD-10-CM

## 2020-05-12 PROCEDURE — 99213 OFFICE O/P EST LOW 20 MIN: CPT | Mod: 95,CR | Performed by: NURSE PRACTITIONER

## 2020-05-12 ASSESSMENT — PATIENT HEALTH QUESTIONNAIRE - PHQ9: CLINICAL INTERPRETATION OF PHQ2 SCORE: 0

## 2020-05-12 NOTE — LETTER
May 12, 2020       Patient: Flaca Nickerson   YOB: 1990   Date of Visit: 5/12/2020         To Whom It May Concern:    In my medical opinion, I recommend that Flaca Nickerson may return to work 5/13/2020 with no COVID-19 symptoms.    If you have any questions or concerns, please don't hesitate to call [phone]          Sincerely,          Tee Manriquez A.P.N.  Electronically Signed

## 2020-05-12 NOTE — TELEPHONE ENCOUNTER
1. Caller Name: Cherelle Hatfield               Call Back Number: [phone]  [facility] PCP or Specialty Provider: Yes         2.  Does patient have any active symptoms of respiratory illness? Yes, the patient reports the following respiratory symptoms: sore throat and earache on left side only.  She says that her throat is a little red.  She noticed these symptoms this morning and it started with an earache.     3.  Does patient have any comoribidities? Asthma    4.  Has the patient traveled in the last 14 days OR had any known contact with someone who is suspected or confirmed to have COVID-19?  No.    5. Disposition: Cleared by RN Triage as potential is low for COVID-19; OK to keep/schedule appointment for virtual appointment with Dr. Manriquez.     Note routed to [facility] Provider: FYI only.

## 2020-05-12 NOTE — PROGRESS NOTES
"This encounter was conducted via Zoom meeting  Verbal consent was obtained. Patient's identity was verified.  Patient declined in office visit because of COVID-19 concerns.      CC: Patient complaining of mild sore throat and left ear pain.                                                                                                                                      HPI:   Flaca presents today with the following.    1. Pharyngitis, unspecified etiology  Patient states her symptoms just started about 12 hours ago with fullness in her left ear and minor discomfort.  She also has had some mild left-sided sore throat.  She states there is no associated fever, cough, shortness of breath, swollen lymph nodes, sinus congestion, rhinorrhea or headache.  She would like a note so she can return to work tomorrow.  She states she has otherwise been staying healthy.      Patient Active Problem List    Diagnosis Date Noted   • Bacterial vaginosis 05/26/2019   • Behcet's disease (HCC) 01/29/2015   • Colon polyps 09/10/2013       Current Outpatient Medications   Medication Sig Dispense Refill   • ipratropium-albuterol (COMBIVENT RESPIMAT)  MCG/ACT Aero Soln Inhale 1 Puff by mouth 3 times a day. 1 Inhaler 0     No current facility-administered medications for this visit.          Allergies as of 05/12/2020 - Reviewed 05/12/2020   Allergen Reaction Noted   • Iodine Hives and Rash 07/09/2015   • Morphine Rash 01/28/2015        ROS:  Denies, chest pain, Shortness of breath, fever, cough, inability to swallow, wheezing, rhinorrhea, headache or edema.     Ht 1.549 m (5' 1\")   LMP 10/21/2019   BMI 26.15 kg/m²       Physical Exam:  Constitutional: Alert, no distress, well-groomed.  Skin: No rashes in visible areas.  Eye: Round. Conjunctiva clear, lNo icterus.   ENMT: Lips pink without lesions, good dentition, moist mucous membranes. Phonation normal.  Unable to visualize any exudate or erythema of the posterior pharynx and " she reports no lymphadenopathy to palpation  Neck: No masses, no thyromegaly. Moves freely without pain.  CV: Pulse as reported by patient  Respiratory: Unlabored respiratory effort, no cough or audible wheeze  Psych: Alert and oriented x3, normal affect and mood.          Assessment and Plan.   30 y.o. female with the following issues.    1. Pharyngitis, unspecified etiology  Pharyngitis teaching provided including:  A. Ibuprofen, Aleve, or Tylenol for pain and fever.  B. Chloraseptic spray or lozenges for topical relief.  C. Increase humidity in house.  D. Consider over-the-counter nasal spray if postnasal drip contributing to pain.  E. Avoid spicy or acidic foods and fluids which may irritate the throat.  A return to work slip for tomorrow will be provided.

## 2020-05-24 ENCOUNTER — HOSPITAL ENCOUNTER (EMERGENCY)
Facility: MEDICAL CENTER | Age: 30
End: 2020-05-24
Attending: EMERGENCY MEDICINE
Payer: COMMERCIAL

## 2020-05-24 ENCOUNTER — APPOINTMENT (OUTPATIENT)
Dept: RADIOLOGY | Facility: MEDICAL CENTER | Age: 30
End: 2020-05-24
Attending: EMERGENCY MEDICINE
Payer: COMMERCIAL

## 2020-05-24 VITALS
TEMPERATURE: 98.5 F | HEART RATE: 73 BPM | RESPIRATION RATE: 16 BRPM | WEIGHT: 137.79 LBS | OXYGEN SATURATION: 97 % | SYSTOLIC BLOOD PRESSURE: 106 MMHG | BODY MASS INDEX: 26.03 KG/M2 | DIASTOLIC BLOOD PRESSURE: 72 MMHG

## 2020-05-24 DIAGNOSIS — R10.2 PELVIC PAIN: ICD-10-CM

## 2020-05-24 LAB
ALBUMIN SERPL BCP-MCNC: 4.1 G/DL (ref 3.2–4.9)
ALBUMIN/GLOB SERPL: 1.3 G/DL
ALP SERPL-CCNC: 55 U/L (ref 30–99)
ALT SERPL-CCNC: 25 U/L (ref 2–50)
ANION GAP SERPL CALC-SCNC: 11 MMOL/L (ref 7–16)
APPEARANCE UR: CLEAR
AST SERPL-CCNC: 20 U/L (ref 12–45)
BACTERIA #/AREA URNS HPF: NEGATIVE /HPF
BASOPHILS # BLD AUTO: 0.5 % (ref 0–1.8)
BASOPHILS # BLD: 0.04 K/UL (ref 0–0.12)
BILIRUB SERPL-MCNC: 0.3 MG/DL (ref 0.1–1.5)
BILIRUB UR QL STRIP.AUTO: NEGATIVE
BUN SERPL-MCNC: 17 MG/DL (ref 8–22)
CALCIUM SERPL-MCNC: 9.8 MG/DL (ref 8.5–10.5)
CHLORIDE SERPL-SCNC: 100 MMOL/L (ref 96–112)
CO2 SERPL-SCNC: 23 MMOL/L (ref 20–33)
COLOR UR: YELLOW
CREAT SERPL-MCNC: 0.6 MG/DL (ref 0.5–1.4)
EOSINOPHIL # BLD AUTO: 0.24 K/UL (ref 0–0.51)
EOSINOPHIL NFR BLD: 3.3 % (ref 0–6.9)
EPI CELLS #/AREA URNS HPF: ABNORMAL /HPF
ERYTHROCYTE [DISTWIDTH] IN BLOOD BY AUTOMATED COUNT: 43.8 FL (ref 35.9–50)
GLOBULIN SER CALC-MCNC: 3.1 G/DL (ref 1.9–3.5)
GLUCOSE SERPL-MCNC: 92 MG/DL (ref 65–99)
GLUCOSE UR STRIP.AUTO-MCNC: NEGATIVE MG/DL
HCG SERPL QL: NEGATIVE
HCT VFR BLD AUTO: 40.7 % (ref 37–47)
HGB BLD-MCNC: 13.8 G/DL (ref 12–16)
HYALINE CASTS #/AREA URNS LPF: ABNORMAL /LPF
IMM GRANULOCYTES # BLD AUTO: 0.04 K/UL (ref 0–0.11)
IMM GRANULOCYTES NFR BLD AUTO: 0.5 % (ref 0–0.9)
KETONES UR STRIP.AUTO-MCNC: NEGATIVE MG/DL
LEUKOCYTE ESTERASE UR QL STRIP.AUTO: NEGATIVE
LIPASE SERPL-CCNC: 47 U/L (ref 11–82)
LYMPHOCYTES # BLD AUTO: 2.82 K/UL (ref 1–4.8)
LYMPHOCYTES NFR BLD: 38.7 % (ref 22–41)
MCH RBC QN AUTO: 28.9 PG (ref 27–33)
MCHC RBC AUTO-ENTMCNC: 33.9 G/DL (ref 33.6–35)
MCV RBC AUTO: 85.1 FL (ref 81.4–97.8)
MICRO URNS: ABNORMAL
MONOCYTES # BLD AUTO: 0.57 K/UL (ref 0–0.85)
MONOCYTES NFR BLD AUTO: 7.8 % (ref 0–13.4)
NEUTROPHILS # BLD AUTO: 3.58 K/UL (ref 2–7.15)
NEUTROPHILS NFR BLD: 49.2 % (ref 44–72)
NITRITE UR QL STRIP.AUTO: NEGATIVE
NRBC # BLD AUTO: 0 K/UL
NRBC BLD-RTO: 0 /100 WBC
PH UR STRIP.AUTO: 5 [PH] (ref 5–8)
PLATELET # BLD AUTO: 305 K/UL (ref 164–446)
PMV BLD AUTO: 9.6 FL (ref 9–12.9)
POTASSIUM SERPL-SCNC: 3.5 MMOL/L (ref 3.6–5.5)
PROT SERPL-MCNC: 7.2 G/DL (ref 6–8.2)
PROT UR QL STRIP: NEGATIVE MG/DL
RBC # BLD AUTO: 4.78 M/UL (ref 4.2–5.4)
RBC # URNS HPF: >150 /HPF
RBC UR QL AUTO: ABNORMAL
SODIUM SERPL-SCNC: 134 MMOL/L (ref 135–145)
SP GR UR STRIP.AUTO: 1.03
UROBILINOGEN UR STRIP.AUTO-MCNC: 0.2 MG/DL
WBC # BLD AUTO: 7.3 K/UL (ref 4.8–10.8)
WBC #/AREA URNS HPF: ABNORMAL /HPF

## 2020-05-24 PROCEDURE — 85025 COMPLETE CBC W/AUTO DIFF WBC: CPT

## 2020-05-24 PROCEDURE — 81001 URINALYSIS AUTO W/SCOPE: CPT

## 2020-05-24 PROCEDURE — 76856 US EXAM PELVIC COMPLETE: CPT

## 2020-05-24 PROCEDURE — 304991 HCHG REPAIR-COMPLEX-LVL 1, ADD 5 CM

## 2020-05-24 PROCEDURE — 80053 COMPREHEN METABOLIC PANEL: CPT

## 2020-05-24 PROCEDURE — 83690 ASSAY OF LIPASE: CPT

## 2020-05-24 PROCEDURE — 84703 CHORIONIC GONADOTROPIN ASSAY: CPT

## 2020-05-24 PROCEDURE — 99284 EMERGENCY DEPT VISIT MOD MDM: CPT

## 2020-05-24 PROCEDURE — 36415 COLL VENOUS BLD VENIPUNCTURE: CPT

## 2020-05-24 ASSESSMENT — FIBROSIS 4 INDEX: FIB4 SCORE: 0.41

## 2020-05-24 NOTE — ED TRIAGE NOTES
Chief Complaint   Patient presents with   • LLQ Pain     Pt reports LLQ pain that radiates to her flank that started since Fri. recent U/S showing fluid in left ovary. pt states pain not getting better. Pt denies fever/chills/dysuria   • Flank Pain   • Vaginal Bleeding     possible menses.      Pt given urinary supplies/instruction.  Pt/staff masked and in appropriate PPE during encounter.  Pt denies fever/travel or being in contact with anyone testing positive for COVID/Corona.  Explained to pt triage process, made pt aware to tell this RN/staff of any changes/concerns, pt verbalized understanding of process and instructions given. Pt to ER lobby.

## 2020-05-24 NOTE — ED PROVIDER NOTES
ED Provider Note    CHIEF COMPLAINT  Chief Complaint   Patient presents with   • LLQ Pain     Pt reports LLQ pain that radiates to her flank that started since Fri. recent U/S showing fluid in left ovary. pt states pain not getting better. Pt denies fever/chills/dysuria   • Flank Pain   • Vaginal Bleeding     possible menses.        HPI  Flaca Nickerson is a 30 y.o. female who presents planing of abdominal pain.  The patient reports some pain in her lower belly.  This started off on Thursday.  Some left lower quadrant.  She was seen in outside hospital, there she had an ultrasound which showed hydrosalpinx.  Ultimately gynecology was consulted, and apparently disagreed with that diagnosis.  She put on doxycycline empirically.  Since that time the patient continued to feel poorly.  She is not gotten worse although the discomfort has migrated to the middle part of the belly.  The night that she was seen in the ER at the outside hospital she started having vaginal bleeding.  This is heavier than typical for her.  She denies any passing out.  Denies any fever chills.  No nausea vomiting.  She spoke to her mother, who is an RN, who recommended she come to the hospital here for repeat evaluation.  The patient did receive a miscarriage, 1 month ago.  Aside from the vaginal bleeding there is been no abnormal discharge.  No stool changes.  No urine changes.  No fever.  There is no other complaint.    I did get records from the other hospital showed normal laboratories, sonogram showing hydrosalpinx versus thrombosed pelvic varicosities.  She is also seen by gynecology there and put on doxycycline empirically.  Recommend a follow-up ultrasound in 6 weeks.    PAST MEDICAL HISTORY  Past Medical History:   Diagnosis Date   • Abdominal mass 9/10/2013   • Adenomatous polyps     in colon   • ASTHMA     outgrown per mother   • Behcet's disease (HCC)    • Behcet's disease (HCC)    • Colon polyps 9/10/2013   • HYPOTENSION   "  • Migraine    • Mouth ulcer     abcess   • Palpitation    • Palpitations     SVT   • SVT (supraventricular tachycardia) (HCC)    • Typhoid fever        FAMILY HISTORY  Family History   Problem Relation Age of Onset   • Heart Disease Father    • Thyroid Mother    • Diabetes Maternal Grandmother    • Hypertension Maternal Grandmother    • Hypertension Maternal Grandfather    • Heart Disease Paternal Grandfather    • Cancer Maternal Aunt 35        breast ca       SOCIAL HISTORY  Social History     Tobacco Use   • Smoking status: Never Smoker   • Smokeless tobacco: Never Used   Substance Use Topics   • Alcohol use: Yes     Alcohol/week: 0.0 oz     Comment: rarely   • Drug use: No         SURGICAL HISTORY  Past Surgical History:   Procedure Laterality Date   • TONSILLECTOMY  2004   • APPENDECTOMY         CURRENT MEDICATIONS    I have reviewed the nurses notes and/or the list brought with the patient.    ALLERGIES  Allergies   Allergen Reactions   • Iodine Hives and Rash         • Morphine Rash     \"Skin burny rash.\"       REVIEW OF SYSTEMS  See HPI for further details. Review of systems as above, otherwise all other systems are negative.     PHYSICAL EXAM  VITAL SIGNS: /75   Pulse 98   Temp 36.7 °C (98 °F) (Temporal)   Resp 16   Wt 62.5 kg (137 lb 12.6 oz)   LMP 10/21/2019   SpO2 96%   BMI 26.03 kg/m²     Constitutional: Well appearing patient in no acute distress.  Not toxic, nor ill in appearance.  HENT: Mucus membranes moist.  Oropharynx is clear.  Eyes: Pupils equally round.  No scleral icterus.   Neck: Full nontender range of motion.  Lymphatic: No cervical lymphadenopathy noted.   Cardiovascular: Regular heart rate and rhythm.  No murmurs, rubs, nor gallop appreciated.   Thorax & Lungs: Chest is nontender.  Lungs are clear to auscultation with good air movement bilaterally.  No wheeze, rhonchi, nor rales.   Abdomen: Soft, with mild left lower quadrant tenderness to deep palpation however no rebound " nor guarding.  No mass, pulsatile mass, nor hepatosplenomegaly appreciated.  Skin: No purpura nor petechia noted.  Extremities/Musculoskeletal: No sign of trauma.  Calves are nontender with no cords nor edema.  No Mireya's sign.  Pulses are intact all around.   Neurologic: Alert & oriented.  Strength and sensation is intact all around.  Gait is normal.  Psychiatric: Normal affect appropriate for the clinical situation.      LABS  Labs Reviewed   COMP METABOLIC PANEL - Abnormal; Notable for the following components:       Result Value    Sodium 134 (*)     Potassium 3.5 (*)     All other components within normal limits   URINALYSIS,CULTURE IF INDICATED - Abnormal; Notable for the following components:    Occult Blood Large (*)     All other components within normal limits   URINE MICROSCOPIC (W/UA) - Abnormal; Notable for the following components:    RBC >150 (*)     Hyaline Cast 3-5 (*)     All other components within normal limits   CBC WITH DIFFERENTIAL   LIPASE   HCG QUAL SERUM   ESTIMATED GFR         RADIOLOGY/PROCEDURES  I have reviewed the patient's film interpretations myself, and they are read out by the radiologist as:   US-PELVIC COMPLETE (TRANSABDOMINAL/TRANSVAGINAL) (COMBO)   Final Result      1.  Normal uterus and ovaries. No evidence of hydrosalpinx.   2.  No pelvic free fluid.        .    MEDICAL RECORD  I have reviewed patient's medical record and pertinent results are listed above.    COURSE & MEDICAL DECISION MAKING  I have reviewed any medical record information, laboratory studies and radiographic results as noted above.  This patient presents with ongoing pelvic pain, with a question of a hydrosalpinx diagnosed last week.  She is on doxycycline empirically as recommended by gynecology.  I do note that she has had no discharge to suggest infection however, also she had testing earlier this month as a part of her pregnancy work-up.  This was all normal.  At this point I do not think that a repeat  pelvic exam is indicated and she agrees with this.  Repeat her laboratories are unrevealing.  Also appears sonogram which is normal.  At this point, I do wonder if her symptoms could be related from her miscarriage 1 month ago.  For now, I recommend ibuprofen the next few days with meals.  Also recommend she follow-up with her gynecologist except a week for recheck.  Instructions on pelvic pain.    FINAL IMPRESSION  1. Pelvic pain    2.  Recent miscarriage  3.  No evidence of hydrosalpinx       This dictation was created using voice recognition software.    Electronically signed by: Rodney Desir M.D., 5/24/2020 3:17 PM

## 2020-05-25 NOTE — DISCHARGE INSTRUCTIONS
Ibuprofen 600mg 3x/day (with meals) for the next few days.  Recommend following up with your gynecologist for recheck later this week.

## 2020-05-25 NOTE — ED NOTES
Discharge instructions given and explained to pt. All questions answered and pt verbalized understanding.

## 2020-09-02 ENCOUNTER — HOSPITAL ENCOUNTER (OUTPATIENT)
Facility: MEDICAL CENTER | Age: 30
End: 2020-09-02
Attending: NURSE PRACTITIONER
Payer: COMMERCIAL

## 2020-09-02 ENCOUNTER — EH NON-PROVIDER (OUTPATIENT)
Dept: OCCUPATIONAL MEDICINE | Facility: CLINIC | Age: 30
End: 2020-09-02

## 2020-09-02 DIAGNOSIS — Z11.59 ENCOUNTER FOR SCREENING FOR OTHER VIRAL DISEASES: ICD-10-CM

## 2020-09-02 LAB
COVID ORDER STATUS COVID19: NORMAL
SARS-COV-2 RNA RESP QL NAA+PROBE: NOTDETECTED
SPECIMEN SOURCE: NORMAL

## 2020-09-02 PROCEDURE — U0003 INFECTIOUS AGENT DETECTION BY NUCLEIC ACID (DNA OR RNA); SEVERE ACUTE RESPIRATORY SYNDROME CORONAVIRUS 2 (SARS-COV-2) (CORONAVIRUS DISEASE [COVID-19]), AMPLIFIED PROBE TECHNIQUE, MAKING USE OF HIGH THROUGHPUT TECHNOLOGIES AS DESCRIBED BY CMS-2020-01-R: HCPCS | Mod: CS | Performed by: PREVENTIVE MEDICINE

## 2020-09-03 ENCOUNTER — TELEPHONE (OUTPATIENT)
Dept: OCCUPATIONAL MEDICINE | Facility: CLINIC | Age: 30
End: 2020-09-03

## 2020-09-03 NOTE — TELEPHONE ENCOUNTER
Phone Number Called: [phone] (home)       Call outcome: Spoke to patient regarding message below.    Message: Covid test result came back negative. Employee needs to contact IP in regards to the test.

## 2020-10-08 ENCOUNTER — HOSPITAL ENCOUNTER (OUTPATIENT)
Facility: MEDICAL CENTER | Age: 30
End: 2020-10-08
Attending: PHYSICIAN ASSISTANT
Payer: COMMERCIAL

## 2020-10-08 ENCOUNTER — OFFICE VISIT (OUTPATIENT)
Dept: URGENT CARE | Facility: PHYSICIAN GROUP | Age: 30
End: 2020-10-08
Payer: COMMERCIAL

## 2020-10-08 VITALS
SYSTOLIC BLOOD PRESSURE: 118 MMHG | WEIGHT: 152 LBS | DIASTOLIC BLOOD PRESSURE: 72 MMHG | RESPIRATION RATE: 16 BRPM | HEART RATE: 70 BPM | OXYGEN SATURATION: 98 % | HEIGHT: 61 IN | TEMPERATURE: 98 F | BODY MASS INDEX: 28.7 KG/M2

## 2020-10-08 DIAGNOSIS — J02.9 PHARYNGITIS, UNSPECIFIED ETIOLOGY: ICD-10-CM

## 2020-10-08 DIAGNOSIS — N76.0 ACUTE VAGINITIS: ICD-10-CM

## 2020-10-08 DIAGNOSIS — R05.9 COUGH: ICD-10-CM

## 2020-10-08 DIAGNOSIS — Z20.818 EXPOSURE TO STREP THROAT: ICD-10-CM

## 2020-10-08 LAB
COVID ORDER STATUS COVID19: NORMAL
INT CON NEG: NORMAL
INT CON POS: NORMAL
S PYO AG THROAT QL: NEGATIVE

## 2020-10-08 PROCEDURE — 87480 CANDIDA DNA DIR PROBE: CPT

## 2020-10-08 PROCEDURE — 99214 OFFICE O/P EST MOD 30 MIN: CPT | Performed by: PHYSICIAN ASSISTANT

## 2020-10-08 PROCEDURE — 87510 GARDNER VAG DNA DIR PROBE: CPT

## 2020-10-08 PROCEDURE — 87660 TRICHOMONAS VAGIN DIR PROBE: CPT

## 2020-10-08 PROCEDURE — 87880 STREP A ASSAY W/OPTIC: CPT | Performed by: PHYSICIAN ASSISTANT

## 2020-10-08 PROCEDURE — U0003 INFECTIOUS AGENT DETECTION BY NUCLEIC ACID (DNA OR RNA); SEVERE ACUTE RESPIRATORY SYNDROME CORONAVIRUS 2 (SARS-COV-2) (CORONAVIRUS DISEASE [COVID-19]), AMPLIFIED PROBE TECHNIQUE, MAKING USE OF HIGH THROUGHPUT TECHNOLOGIES AS DESCRIBED BY CMS-2020-01-R: HCPCS

## 2020-10-08 RX ORDER — AMOXICILLIN 875 MG/1
875 TABLET, COATED ORAL 2 TIMES DAILY
Qty: 20 TAB | Refills: 0 | Status: SHIPPED | OUTPATIENT
Start: 2020-10-08 | End: 2020-10-18

## 2020-10-08 RX ORDER — METRONIDAZOLE 500 MG/1
500 TABLET ORAL 3 TIMES DAILY
Qty: 21 TAB | Refills: 0 | Status: SHIPPED | OUTPATIENT
Start: 2020-10-08 | End: 2020-10-15

## 2020-10-08 ASSESSMENT — ENCOUNTER SYMPTOMS
VOMITING: 0
SHORTNESS OF BREATH: 0
SORE THROAT: 1
CHILLS: 0
SPUTUM PRODUCTION: 0
DIARRHEA: 0
NAUSEA: 0
COUGH: 1
FEVER: 0
ABDOMINAL PAIN: 0
WHEEZING: 0

## 2020-10-08 ASSESSMENT — FIBROSIS 4 INDEX: FIB4 SCORE: 0.37

## 2020-10-08 NOTE — LETTER
October 8, 2020       Patient: Flaca Nickerson   YOB: 1990   Date of Visit: 10/8/2020       To Whom It May Concern:  A concern for COVID-19 has been identified and testing is in progress.?   ?  We are asking you to excuse absences while following self-isolation protocol per Center for Disease Control (CDC) guidelines. 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   ?  If the results of testing are negative, and once there has been no fever (temperature >100.4 F) for at least 72 hours without treatment, and no vomiting or diarrhea for at least 48 hours, then return to work is approved.   ?  If the results of testing are positive then your employee/student will be contacted by the [facility] or [facility] department for further instructions on duration of self-isolation and return to work protocol. In general, this will also follow the CDC guidelines with a minimum of 10 days from the onset of symptoms and without fever, vomiting, or diarrhea as above.?   ?  In general, repeat testing is not necessary and not offered through our [facility].?   ?  This is the only note that will be provided from [facility] for this visit. Your employee/student will require an appointment with a primary care provider if FMLA or disability forms are required.   ?  Sincerely,          Patrice De Anda P.A.-C.  Electronically Signed

## 2020-10-09 LAB
CANDIDA DNA VAG QL PROBE+SIG AMP: NEGATIVE
G VAGINALIS DNA VAG QL PROBE+SIG AMP: NEGATIVE
SARS-COV-2 RNA RESP QL NAA+PROBE: NOTDETECTED
SPECIMEN SOURCE: NORMAL
T VAGINALIS DNA VAG QL PROBE+SIG AMP: NEGATIVE

## 2020-10-09 NOTE — PROGRESS NOTES
"Subjective:   Flaca Nickerson  is a 30 y.o. female who presents for Sore Throat (chest congestion, cough x 1 day)      Pharyngitis   This is a new problem. The current episode started today. Associated symptoms include congestion and coughing. Pertinent negatives include no abdominal pain, diarrhea, ear pain, shortness of breath or vomiting.   Patient comes clinic complaining of last 1 day of sore throat following daughter positive diagnosis of strep 2 days ago.  Similarly another child the note was positive for strep the day before that.  Patient denies fevers chills but complains of sore throat.  She notes mild congestion to sinuses and chest complains of cough accordingly.  She denies ear pain.  Denies nausea vomiting abdominal pain diarrhea or rash.  She notes past medical history of recurrence of BV and suspects this happen once again.  She notes malodorous vaginal discharge and requests testing for this as well.  She denies dysuria frequency urgency hematuria.  She denies history of asthma bronchitis or pneumonia.  She is tried treatment with OTC NSAIDs.  She notes past medical history of seasonal allergies without treatment.  She denies loss of taste or smell.  She complains of mild diarrhea and loss of appetite through today.  Complains of mild frontal headache.    Review of Systems   Constitutional: Negative for chills and fever.   HENT: Positive for congestion and sore throat. Negative for ear pain.    Respiratory: Positive for cough. Negative for sputum production, shortness of breath and wheezing.    Gastrointestinal: Negative for abdominal pain, diarrhea, nausea and vomiting.   Genitourinary: Negative.         Abnormal vaginal discharge   Skin: Negative for rash.   Endo/Heme/Allergies: Positive for environmental allergies.       Allergies   Allergen Reactions   • Iodine Hives and Rash         • Morphine Rash     \"Skin burny rash.\"        Objective:   /72   Pulse 70   Temp 36.7 °C (98 " "°F) (Temporal)   Resp 16   Ht 1.549 m (5' 1\")   Wt 68.9 kg (152 lb)   LMP 10/21/2019   SpO2 98%   BMI 28.72 kg/m²     Physical Exam  Vitals signs and nursing note reviewed.   Constitutional:       General: She is not in acute distress.     Appearance: She is well-developed. She is not diaphoretic.   HENT:      Head: Normocephalic and atraumatic.      Right Ear: Tympanic membrane, ear canal and external ear normal.      Left Ear: Tympanic membrane, ear canal and external ear normal.      Nose: Nose normal.      Mouth/Throat:      Pharynx: Uvula midline. Posterior oropharyngeal erythema ( mild PND) present. No oropharyngeal exudate.      Tonsils: No tonsillar abscesses.   Eyes:      General: Lids are normal. No scleral icterus.        Right eye: No discharge.         Left eye: No discharge.      Conjunctiva/sclera: Conjunctivae normal.   Neck:      Musculoskeletal: Neck supple.   Pulmonary:      Effort: Pulmonary effort is normal. No respiratory distress.      Breath sounds: No decreased breath sounds, wheezing, rhonchi or rales.   Genitourinary:     Comments: Pelvic exam deferred by patient for vaginal pathogen self swab  Musculoskeletal: Normal range of motion.   Lymphadenopathy:      Cervical: Cervical adenopathy ( mild bilat) present.   Skin:     General: Skin is warm and dry.      Coloration: Skin is not pale.      Findings: No erythema.   Neurological:      Mental Status: She is alert and oriented to person, place, and time. She is not disoriented.   Psychiatric:         Speech: Speech normal.         Behavior: Behavior normal.       POCT strep - NEG  COVID pending   Vag Path    Assessment/Plan:   1. Pharyngitis, unspecified etiology  - POCT Rapid Strep A  - COVID/SARS COV-2 PCR; Future    2. Cough    3. Acute vaginitis  - VAGINAL PATHOGENS DNA PANEL; Future  - metroNIDAZOLE (FLAGYL) 500 MG Tab; Take 1 Tab by mouth 3 times a day for 7 days.  Dispense: 21 Tab; Refill: 0    4. Exposure to strep throat  - " "amoxicillin (AMOXIL) 875 MG tablet; Take 1 Tab by mouth 2 times a day for 10 days.  Dispense: 20 Tab; Refill: 0  Supportive care is reviewed with patient/caregiver - recommend to push PO fluids and electrolytes,  take full course of Rx, take with probiotics, observe for resolution --patient with exposure to strep with tube positive patients in the home and concern on her part despite negative testing clinic today- she is encouraged to hold this medication unless symptoms worsen, Contingent antibiotic prescription given to patient to fill upon meeting criteria of guidelines discussed.      In regards to COVID test:   over-the-counter symptom support medications reviewed, ER precautions with worsened symptoms, quarantine recommendations reviewed, sent with letter, MyChart for results of testing    Again patient is \"very confident\" that she has BV and reiterates she has had a very difficult time seen primary care doctors and requests treatment and testing today.  She is sent with Flagyl and will follow vaginal pathogen self swab.    I have worn an N95 mask, gloves and eye protection for the entire encounter with this patient.     Differential diagnosis, natural history, supportive care, and indications for immediate follow-up discussed.     "

## 2020-11-23 ENCOUNTER — EH NON-PROVIDER (OUTPATIENT)
Dept: OCCUPATIONAL MEDICINE | Facility: CLINIC | Age: 30
End: 2020-11-23

## 2020-11-23 ENCOUNTER — HOSPITAL ENCOUNTER (OUTPATIENT)
Facility: MEDICAL CENTER | Age: 30
End: 2020-11-23
Attending: PREVENTIVE MEDICINE
Payer: COMMERCIAL

## 2020-11-23 DIAGNOSIS — Z11.59 ENCOUNTER FOR SCREENING FOR OTHER VIRAL DISEASES: ICD-10-CM

## 2020-11-23 PROCEDURE — U0003 INFECTIOUS AGENT DETECTION BY NUCLEIC ACID (DNA OR RNA); SEVERE ACUTE RESPIRATORY SYNDROME CORONAVIRUS 2 (SARS-COV-2) (CORONAVIRUS DISEASE [COVID-19]), AMPLIFIED PROBE TECHNIQUE, MAKING USE OF HIGH THROUGHPUT TECHNOLOGIES AS DESCRIBED BY CMS-2020-01-R: HCPCS | Mod: CS | Performed by: PREVENTIVE MEDICINE

## 2020-11-23 PROCEDURE — U0003 INFECTIOUS AGENT DETECTION BY NUCLEIC ACID (DNA OR RNA); SEVERE ACUTE RESPIRATORY SYNDROME CORONAVIRUS 2 (SARS-COV-2) (CORONAVIRUS DISEASE [COVID-19]), AMPLIFIED PROBE TECHNIQUE, MAKING USE OF HIGH THROUGHPUT TECHNOLOGIES AS DESCRIBED BY CMS-2020-01-R: HCPCS

## 2020-11-23 PROCEDURE — C9803 HOPD COVID-19 SPEC COLLECT: HCPCS

## 2020-12-19 ENCOUNTER — HOSPITAL ENCOUNTER (OUTPATIENT)
Dept: LAB | Facility: MEDICAL CENTER | Age: 30
End: 2020-12-19
Attending: NURSE PRACTITIONER
Payer: COMMERCIAL

## 2020-12-19 ENCOUNTER — OFFICE VISIT (OUTPATIENT)
Dept: URGENT CARE | Facility: PHYSICIAN GROUP | Age: 30
End: 2020-12-19
Payer: COMMERCIAL

## 2020-12-19 VITALS
WEIGHT: 162 LBS | HEART RATE: 71 BPM | SYSTOLIC BLOOD PRESSURE: 110 MMHG | OXYGEN SATURATION: 97 % | HEIGHT: 61 IN | DIASTOLIC BLOOD PRESSURE: 60 MMHG | BODY MASS INDEX: 30.58 KG/M2 | RESPIRATION RATE: 16 BRPM | TEMPERATURE: 99.4 F

## 2020-12-19 DIAGNOSIS — N64.9 BREAST DISORDER: ICD-10-CM

## 2020-12-19 DIAGNOSIS — Z20.828 CONTACT WITH AND (SUSPECTED) EXPOSURE TO OTHER VIRAL COMMUNICABLE DISEASES: ICD-10-CM

## 2020-12-19 PROCEDURE — 99214 OFFICE O/P EST MOD 30 MIN: CPT | Mod: CS | Performed by: NURSE PRACTITIONER

## 2020-12-19 PROCEDURE — U0003 INFECTIOUS AGENT DETECTION BY NUCLEIC ACID (DNA OR RNA); SEVERE ACUTE RESPIRATORY SYNDROME CORONAVIRUS 2 (SARS-COV-2) (CORONAVIRUS DISEASE [COVID-19]), AMPLIFIED PROBE TECHNIQUE, MAKING USE OF HIGH THROUGHPUT TECHNOLOGIES AS DESCRIBED BY CMS-2020-01-R: HCPCS

## 2020-12-19 RX ORDER — TRIAMCINOLONE ACETONIDE 1 MG/G
1 CREAM TOPICAL 2 TIMES DAILY
Qty: 30 G | Refills: 0 | Status: SHIPPED | OUTPATIENT
Start: 2020-12-19 | End: 2021-01-12

## 2020-12-19 ASSESSMENT — ENCOUNTER SYMPTOMS
FEVER: 0
VOMITING: 0
DIARRHEA: 0
CHILLS: 1
MYALGIAS: 1

## 2020-12-19 ASSESSMENT — FIBROSIS 4 INDEX: FIB4 SCORE: 0.37

## 2020-12-20 DIAGNOSIS — Z23 NEED FOR VACCINATION: ICD-10-CM

## 2020-12-20 NOTE — PROGRESS NOTES
Subjective:      Cherelle Nickerson is a 30 y.o. female who presents with Emesis (vomiting, body aches, chills, fatigue, noticed changes on nipples.feel dry/flaky. looks like white spots)            HPI New. 30 year old female with body aches, chills and on and off congestion since Wednesday. Symptoms began with vomiting and diarrhea which has no resolved. She has associated fatigue with these symptoms. She denies sore throat, shortness of breath or loss of taste/smell. She also reports skin changes on bilateral nipples. Flaking, itching and tiny white spots on right nipple. She is not nursing. She denies nipple discharge. Works at [employer].    Iodine and Morphine  Current Outpatient Medications on File Prior to Visit   Medication Sig Dispense Refill   • ipratropium-albuterol (COMBIVENT RESPIMAT)  MCG/ACT Aero Soln Inhale 1 Puff by mouth 3 times a day. (Patient not taking: Reported on 12/19/2020) 1 Inhaler 0     No current facility-administered medications on file prior to visit.      Social History     Socioeconomic History   • Marital status: Single     Spouse name: Not on file   • Number of children: Not on file   • Years of education: Not on file   • Highest education level: Not on file   Occupational History   • Not on file   Social Needs   • Financial resource strain: Not on file   • Food insecurity     Worry: Not on file     Inability: Not on file   • Transportation needs     Medical: Not on file     Non-medical: Not on file   Tobacco Use   • Smoking status: Never Smoker   • Smokeless tobacco: Never Used   Substance and Sexual Activity   • Alcohol use: Yes     Alcohol/week: 0.0 oz     Comment: rarely   • Drug use: No   • Sexual activity: Yes     Partners: Male     Birth control/protection: Condom, Pill     Comment: single   Lifestyle   • Physical activity     Days per week: Not on file     Minutes per session: Not on file   • Stress: Not on file   Relationships   • Social connections     Talks  "on phone: Not on file     Gets together: Not on file     Attends Denominational service: Not on file     Active member of club or organization: Not on file     Attends meetings of clubs or organizations: Not on file     Relationship status: Not on file   • Intimate partner violence     Fear of current or ex partner: Not on file     Emotionally abused: Not on file     Physically abused: Not on file     Forced sexual activity: Not on file   Other Topics Concern   • Not on file   Social History Narrative   • Not on file     Breast Cancer-related family history is not on file.      Review of Systems   Constitutional: Positive for chills and malaise/fatigue. Negative for fever.   HENT: Positive for congestion.    Gastrointestinal: Negative for diarrhea and vomiting.   Musculoskeletal: Positive for myalgias.   Skin: Positive for rash.          Objective:     /60   Pulse 71   Temp 37.4 °C (99.4 °F) (Temporal)   Resp 16   Ht 1.549 m (5' 1\")   Wt 73.5 kg (162 lb)   LMP  (LMP Unknown)   SpO2 97%   Breastfeeding Unknown   BMI 30.61 kg/m²      Physical Exam  Vitals signs and nursing note reviewed.   Constitutional:       General: She is not in acute distress.     Appearance: She is well-developed.   HENT:      Head: Normocephalic and atraumatic.      Right Ear: Tympanic membrane, ear canal and external ear normal. No middle ear effusion. Tympanic membrane is not injected or perforated.      Left Ear: Tympanic membrane, ear canal and external ear normal.  No middle ear effusion. Tympanic membrane is not injected or perforated.      Nose: Mucosal edema and rhinorrhea present.      Mouth/Throat:      Pharynx: No oropharyngeal exudate or posterior oropharyngeal erythema.   Eyes:      General:         Right eye: No discharge.         Left eye: No discharge.      Conjunctiva/sclera: Conjunctivae normal.   Neck:      Musculoskeletal: Normal range of motion and neck supple.   Cardiovascular:      Rate and Rhythm: Normal rate " and regular rhythm.      Heart sounds: Normal heart sounds. No murmur.   Pulmonary:      Effort: Pulmonary effort is normal. No respiratory distress.      Breath sounds: Normal breath sounds.   Musculoskeletal: Normal range of motion.      Comments: Normal movement of all 4 extremities.   Lymphadenopathy:      Cervical: No cervical adenopathy.      Upper Body:      Right upper body: No supraclavicular adenopathy.      Left upper body: No supraclavicular adenopathy.   Skin:     General: Skin is warm and dry.      Comments: Skin to bilateral nipples with mild erythema. Butler tubercles present and she has tiny cluster of white lesions that could be consistent with epidermal inclusion cysts. No discharge noted, no peau d'orange.   Neurological:      Mental Status: She is alert and oriented to person, place, and time.      Gait: Gait normal.   Psychiatric:         Behavior: Behavior normal.         Thought Content: Thought content normal.                 Assessment/Plan:        1. Breast disorder  triamcinolone acetonide (KENALOG) 0.1 % Cream   2. Contact with and (suspected) exposure to other viral communicable diseases  COVID/SARS COV-2 PCR     Quarantine discussed with patient.  Daughter was positive 2 weeks ago.  Reassurance on breast issues, trial of steroid cream. If persists she will need follow up with either gyn or PCP.

## 2020-12-21 DIAGNOSIS — N63.0 BREAST LUMP: ICD-10-CM

## 2020-12-30 DIAGNOSIS — N63.0 BREAST LUMP: ICD-10-CM

## 2020-12-31 DIAGNOSIS — N63.0 BREAST LUMP: ICD-10-CM

## 2021-01-12 ENCOUNTER — APPOINTMENT (OUTPATIENT)
Dept: CARDIOLOGY | Facility: MEDICAL CENTER | Age: 31
End: 2021-01-12
Attending: EMERGENCY MEDICINE
Payer: COMMERCIAL

## 2021-01-12 ENCOUNTER — APPOINTMENT (OUTPATIENT)
Dept: RADIOLOGY | Facility: MEDICAL CENTER | Age: 31
End: 2021-01-12
Attending: EMERGENCY MEDICINE
Payer: COMMERCIAL

## 2021-01-12 ENCOUNTER — HOSPITAL ENCOUNTER (EMERGENCY)
Facility: MEDICAL CENTER | Age: 31
End: 2021-01-12
Attending: EMERGENCY MEDICINE
Payer: COMMERCIAL

## 2021-01-12 VITALS
HEIGHT: 61 IN | OXYGEN SATURATION: 98 % | SYSTOLIC BLOOD PRESSURE: 107 MMHG | BODY MASS INDEX: 29.68 KG/M2 | DIASTOLIC BLOOD PRESSURE: 64 MMHG | TEMPERATURE: 98.6 F | WEIGHT: 157.19 LBS | HEART RATE: 74 BPM | RESPIRATION RATE: 16 BRPM

## 2021-01-12 DIAGNOSIS — R07.81 PLEURITIC CHEST PAIN: ICD-10-CM

## 2021-01-12 LAB
ALBUMIN SERPL BCP-MCNC: 4.2 G/DL (ref 3.2–4.9)
ALBUMIN/GLOB SERPL: 1.4 G/DL
ALP SERPL-CCNC: 63 U/L (ref 30–99)
ALT SERPL-CCNC: 19 U/L (ref 2–50)
ANION GAP SERPL CALC-SCNC: 10 MMOL/L (ref 7–16)
APTT PPP: 30.4 SEC (ref 24.7–36)
AST SERPL-CCNC: 17 U/L (ref 12–45)
BASOPHILS # BLD AUTO: 0.5 % (ref 0–1.8)
BASOPHILS # BLD: 0.04 K/UL (ref 0–0.12)
BILIRUB SERPL-MCNC: 0.3 MG/DL (ref 0.1–1.5)
BUN SERPL-MCNC: 9 MG/DL (ref 8–22)
CALCIUM SERPL-MCNC: 9.4 MG/DL (ref 8.5–10.5)
CHLORIDE SERPL-SCNC: 103 MMOL/L (ref 96–112)
CO2 SERPL-SCNC: 23 MMOL/L (ref 20–33)
COVID ORDER STATUS COVID19: NORMAL
CREAT SERPL-MCNC: 0.58 MG/DL (ref 0.5–1.4)
EKG IMPRESSION: NORMAL
EOSINOPHIL # BLD AUTO: 0.32 K/UL (ref 0–0.51)
EOSINOPHIL NFR BLD: 4.1 % (ref 0–6.9)
ERYTHROCYTE [DISTWIDTH] IN BLOOD BY AUTOMATED COUNT: 41.4 FL (ref 35.9–50)
GLOBULIN SER CALC-MCNC: 2.9 G/DL (ref 1.9–3.5)
GLUCOSE SERPL-MCNC: 88 MG/DL (ref 65–99)
HCG SERPL QL: NEGATIVE
HCT VFR BLD AUTO: 42.1 % (ref 37–47)
HGB BLD-MCNC: 14.2 G/DL (ref 12–16)
IMM GRANULOCYTES # BLD AUTO: 0.06 K/UL (ref 0–0.11)
IMM GRANULOCYTES NFR BLD AUTO: 0.8 % (ref 0–0.9)
INR PPP: 0.97 (ref 0.87–1.13)
LV EJECT FRACT  99904: 60
LV EJECT FRACT MOD 2C 99903: 63
LV EJECT FRACT MOD 4C 99902: 61.81
LV EJECT FRACT MOD BP 99901: 61.94
LYMPHOCYTES # BLD AUTO: 2.31 K/UL (ref 1–4.8)
LYMPHOCYTES NFR BLD: 29.3 % (ref 22–41)
MCH RBC QN AUTO: 30.5 PG (ref 27–33)
MCHC RBC AUTO-ENTMCNC: 33.7 G/DL (ref 33.6–35)
MCV RBC AUTO: 90.3 FL (ref 81.4–97.8)
MONOCYTES # BLD AUTO: 0.52 K/UL (ref 0–0.85)
MONOCYTES NFR BLD AUTO: 6.6 % (ref 0–13.4)
NEUTROPHILS # BLD AUTO: 4.64 K/UL (ref 2–7.15)
NEUTROPHILS NFR BLD: 58.7 % (ref 44–72)
NRBC # BLD AUTO: 0 K/UL
NRBC BLD-RTO: 0 /100 WBC
PLATELET # BLD AUTO: 309 K/UL (ref 164–446)
PMV BLD AUTO: 9.9 FL (ref 9–12.9)
POTASSIUM SERPL-SCNC: 3.7 MMOL/L (ref 3.6–5.5)
PROT SERPL-MCNC: 7.1 G/DL (ref 6–8.2)
PROTHROMBIN TIME: 13.2 SEC (ref 12–14.6)
RBC # BLD AUTO: 4.66 M/UL (ref 4.2–5.4)
SARS-COV-2 RNA RESP QL NAA+PROBE: NOTDETECTED
SODIUM SERPL-SCNC: 136 MMOL/L (ref 135–145)
SPECIMEN SOURCE: NORMAL
TROPONIN T SERPL-MCNC: <6 NG/L (ref 6–19)
WBC # BLD AUTO: 7.9 K/UL (ref 4.8–10.8)

## 2021-01-12 PROCEDURE — U0003 INFECTIOUS AGENT DETECTION BY NUCLEIC ACID (DNA OR RNA); SEVERE ACUTE RESPIRATORY SYNDROME CORONAVIRUS 2 (SARS-COV-2) (CORONAVIRUS DISEASE [COVID-19]), AMPLIFIED PROBE TECHNIQUE, MAKING USE OF HIGH THROUGHPUT TECHNOLOGIES AS DESCRIBED BY CMS-2020-01-R: HCPCS | Mod: EDC

## 2021-01-12 PROCEDURE — 93308 TTE F-UP OR LMTD: CPT | Mod: 26 | Performed by: INTERNAL MEDICINE

## 2021-01-12 PROCEDURE — 93005 ELECTROCARDIOGRAM TRACING: CPT | Mod: EDC | Performed by: EMERGENCY MEDICINE

## 2021-01-12 PROCEDURE — 93325 DOPPLER ECHO COLOR FLOW MAPG: CPT

## 2021-01-12 PROCEDURE — 99285 EMERGENCY DEPT VISIT HI MDM: CPT | Mod: EDC

## 2021-01-12 PROCEDURE — 84484 ASSAY OF TROPONIN QUANT: CPT | Mod: EDC

## 2021-01-12 PROCEDURE — 84703 CHORIONIC GONADOTROPIN ASSAY: CPT | Mod: EDC

## 2021-01-12 PROCEDURE — 85610 PROTHROMBIN TIME: CPT | Mod: EDC

## 2021-01-12 PROCEDURE — 700117 HCHG RX CONTRAST REV CODE 255: Mod: EDC | Performed by: EMERGENCY MEDICINE

## 2021-01-12 PROCEDURE — 85025 COMPLETE CBC W/AUTO DIFF WBC: CPT | Mod: EDC

## 2021-01-12 PROCEDURE — 71045 X-RAY EXAM CHEST 1 VIEW: CPT

## 2021-01-12 PROCEDURE — 85730 THROMBOPLASTIN TIME PARTIAL: CPT | Mod: EDC

## 2021-01-12 PROCEDURE — 36415 COLL VENOUS BLD VENIPUNCTURE: CPT | Mod: EDC

## 2021-01-12 PROCEDURE — 700111 HCHG RX REV CODE 636 W/ 250 OVERRIDE (IP): Mod: EDC

## 2021-01-12 PROCEDURE — 93005 ELECTROCARDIOGRAM TRACING: CPT

## 2021-01-12 PROCEDURE — U0005 INFEC AGEN DETEC AMPLI PROBE: HCPCS | Mod: EDC

## 2021-01-12 PROCEDURE — 94760 N-INVAS EAR/PLS OXIMETRY 1: CPT | Mod: EDC

## 2021-01-12 PROCEDURE — 93321 DOPPLER ECHO F-UP/LMTD STD: CPT | Mod: 26 | Performed by: INTERNAL MEDICINE

## 2021-01-12 PROCEDURE — 71275 CT ANGIOGRAPHY CHEST: CPT

## 2021-01-12 PROCEDURE — 96374 THER/PROPH/DIAG INJ IV PUSH: CPT | Mod: EDC

## 2021-01-12 PROCEDURE — 80053 COMPREHEN METABOLIC PANEL: CPT | Mod: EDC

## 2021-01-12 RX ORDER — DIPHENHYDRAMINE HYDROCHLORIDE 50 MG/ML
50 INJECTION INTRAMUSCULAR; INTRAVENOUS ONCE
Status: COMPLETED | OUTPATIENT
Start: 2021-01-12 | End: 2021-01-12

## 2021-01-12 RX ORDER — DIPHENHYDRAMINE HYDROCHLORIDE 50 MG/ML
INJECTION INTRAMUSCULAR; INTRAVENOUS
Status: COMPLETED
Start: 2021-01-12 | End: 2021-01-12

## 2021-01-12 RX ADMIN — DIPHENHYDRAMINE HYDROCHLORIDE 50 MG: 50 INJECTION INTRAMUSCULAR; INTRAVENOUS at 10:55

## 2021-01-12 RX ADMIN — IOHEXOL 60 ML: 350 INJECTION, SOLUTION INTRAVENOUS at 11:09

## 2021-01-12 ASSESSMENT — FIBROSIS 4 INDEX: FIB4 SCORE: 0.37

## 2021-01-12 NOTE — ED NOTES
"Pt ambulatory to Peds 48. Agree with triage RN note. Instructed to change into gown. Placed on monitors. Pt awake and alert. Reports sudden onset sharp R sided chest pain at 0500, worsens with deep inspiration and speaking loudly. Additionally reports mild cough starting this morning, no cough noted on assessment. Upper lung fields CTA, lower lung fields diminished to ausculation as pt states she is unable to take a deep breathe. Nonmuffled heart tones with RRR. Denies fevers, congestion, vomiting or diarrhea. Pt states she had a similar pain when she was a teenager and \"I had an elevated d dimer and they gave me medicine and sent me home\", it is unclear if pt was formally diagnosed with PE and pt is unable to recall which medication she was given. Call light within reach. Denies additional needs. Up for ERP eval.    "

## 2021-01-12 NOTE — ED PROVIDER NOTES
"ED Provider Note    CHIEF COMPLAINT  Chief Complaint   Patient presents with   • Chest Pain     Started this morning on the right side with a sharp pain on the right, \"It's like a piece of glass is stuck in there.\" Daughter tested positive for COVID one month ago. Associated cough this morning       HPI  Flaca Nickerson is a 30 y.o. female who presents with history of Bechets disease, migraine headaches, SVT, she presents with right-sided pleuritic chest pain.  She reports mild cough.  She denies fever.  She took Aleve this morning which helped her symptoms.  The pain is worse when she breathes, there is no exertional chest pain.  She denies any vomiting or diarrhea.  She says her daughter tested positive for Covid 1 month ago.    REVIEW OF SYSTEMS  See HPI for further details. All other systems are negative.     PAST MEDICAL HISTORY   has a past medical history of Abdominal mass (9/10/2013), Adenomatous polyps, ASTHMA, Behcet's disease ([facility]), Behcet's disease (HCC), Colon polyps (9/10/2013), HYPOTENSION, Migraine, Mouth ulcer, Palpitation, Palpitations, Pulmonary embolism (HCC) (01/01/2009), SVT (supraventricular tachycardia) ([facility]), and Typhoid fever.    SOCIAL HISTORY  Social History     Tobacco Use   • Smoking status: Never Smoker   • Smokeless tobacco: Never Used   Substance and Sexual Activity   • Alcohol use: Yes     Alcohol/week: 0.0 oz     Comment: rarely   • Drug use: No   • Sexual activity: Yes     Partners: Male     Birth control/protection: Condom, Pill     Comment: single       SURGICAL HISTORY   has a past surgical history that includes tonsillectomy (2004) and appendectomy.    CURRENT MEDICATIONS  Home Medications     Reviewed by Orestes Grider R.N. (Registered Nurse) on 01/12/21 at 0843  Med List Status: Partial   Medication Last Dose Status        Patient Landry Taking any Medications                       ALLERGIES  Allergies   Allergen Reactions   • Iodine Hives and Rash         • " "Morphine Rash     \"Skin burny rash.\"       PHYSICAL EXAM  VITAL SIGNS: /79   Pulse 88   Temp 37.1 °C (98.8 °F) (Oral)   Resp 14   Ht 1.549 m (5' 1\")   Wt 71.3 kg (157 lb 3 oz)   LMP 12/15/2020 (Exact Date)   SpO2 97%   BMI 29.70 kg/m²  @SHELIA[[id number]::@   Pulse ox interpretation: I interpret this pulse ox as normal.  Constitutional: Alert.  HENT: No signs of trauma, Bilateral external ears normal, Nose normal.   Eyes: Pupils are equal and reactive, Conjunctiva normal, Non-icteric.   Neck: Normal range of motion, No tenderness, Supple, No stridor.   Lymphatic: No lymphadenopathy noted.   Cardiovascular: Regular rate and rhythm, no murmurs.   Thorax & Lungs: Normal breath sounds, No respiratory distress, No wheezing, No chest tenderness.   Abdomen: Bowel sounds normal, Soft, No tenderness, No masses, No pulsatile masses. No peritoneal signs.  Skin: Warm, Dry, No erythema, No rash.   Back: No bony tenderness, No CVA tenderness.   Extremities: Intact distal pulses, No edema, No tenderness, No cyanosis.  Musculoskeletal: Good range of motion in all major joints. No tenderness to palpation or major deformities noted.   Neurologic: Alert , Normal motor function, Normal sensory function, No focal deficits noted.   Psychiatric: Affect normal, Judgment normal, Mood normal.       DIAGNOSTIC STUDIES / PROCEDURES    EKG  This is a 12-lead EKG interpretation by myself.  It is normal sinus rhythm at a rate of 82.  The axis is normal, the intervals are normal, there is no ST elevation or depression.Compared to ECG 02/04/2019 21:34:54  Sinus bradycardia no longer present        RADIOLOGY  CT-CTA CHEST PULMONARY ARTERY W/ RECONS   Final Result         1. No CT evidence of pulmonary embolism.      2. No airspace opacity. Hazy bibasilar atelectasis.      EC-ECHOCARDIOGRAM LTD W/O CONT   Final Result      DX-CHEST-PORTABLE (1 VIEW)   Final Result         1. No acute cardiopulmonary abnormalities are identified.    "           COURSE & MEDICAL DECISION MAKING  Pertinent Labs & Imaging studies reviewed. (See chart for details)    Differential diagnosis: Pulmonary artery aneurysm, pulmonary embolism, pleurisy    The patient's history of Behcet's disease puts her at risk for pulmonary artery aneurysm, vasculitis, pulmonary embolism, she reports she had a PE at the age of 16 that was not treated with anticoagulation, after reviewing Behcet's disease PEs in this patient population is treated with anti-inflammatory instead of anticoagulation.  I have ordered a PE study CT scan as well as ultrasound to evaluate the patient.    I spoke with Dr. Polanco of cardiology, he agrees with stat cardiac echo order.      The patient's ultrasound and CT chest are unremarkable.  The patient likely has pleurisy as a diagnosis of exclusion.  The patient will be discharged to follow-up with her primary care doctor.  She will continue Aleve.  She will use Tylenol as needed as needed.     The patient will return for new or worsening symptoms and is stable at the time of discharge.    The patient is referred to a primary physician for blood pressure management, diabetic screening, and for all other preventative health concerns.        DISPOSITION:  Patient will be discharged home in stable condition.    FOLLOW UP:  [facility], Emergency Dept  [address] [facility] [address] [phone]    If symptoms worsen    RED LinaresNTristan  [address] [address] [phone]      As needed      OUTPATIENT MEDICATIONS:  New Prescriptions    No medications on file       The patient will return for worsening symptoms and is stable at the time of discharge. The patient verbalizes understanding and will comply.    FINAL IMPRESSION  1. Pleuritic chest pain                Electronically signed by: Pedro Latif M.D., 1/12/2021 9:06 AM

## 2021-01-12 NOTE — ED TRIAGE NOTES
"Chief Complaint   Patient presents with   • Chest Pain     Started this morning on the right side with a sharp pain on the right, \"It's like a piece of glass is stuck in there.\" Daughter tested positive for COVID one month ago. Associated cough this morning     Pt ambulatory to triage for above complaint. History of PE when she was 16 years old.  Pt is AO x 4, follows commands, and responds appropriately to questions. Patient's breathing is mildly labored and pain is currently 7/10 on the 0-10 pain scale.  Pt placed in lobby. Patient educated on triage process and encouraged to alert staff for any changes.    "

## 2021-01-12 NOTE — ED NOTES
PIV established x 1 attempt, blood obtained and sent to lab. NP swab obtained and sent to lab. Pt updated on POC and potential lab wait times, denies additional needs

## 2021-01-12 NOTE — ED NOTES
Discharge teaching for pleuritic chest pain provided to patient. Reviewed home care, importance of hydration and when to return to ED with worsening symptoms. Instructed on importance of follow up care with [facility], Emergency Dept  [address] [facility]  [place] [address] [phone]    If symptoms worsen    DEVANTE Linares  [address]  [facility] [address]  [facility] [address] [phone]      As needed     All questions answered, patient verbalizes understanding to all teaching. Copy of discharge paperwork provided. Signed copy in chart. Armband removed. Pt alert, pink, interactive and in NAD. Ambulatory out of department in stable condition.

## 2021-04-19 ENCOUNTER — HOSPITAL ENCOUNTER (EMERGENCY)
Dept: HOSPITAL 8 - ED | Age: 31
LOS: 1 days | Discharge: HOME | End: 2021-04-20
Payer: COMMERCIAL

## 2021-04-19 VITALS — WEIGHT: 150.58 LBS | HEIGHT: 61 IN | BODY MASS INDEX: 28.43 KG/M2

## 2021-04-19 DIAGNOSIS — Z90.89: ICD-10-CM

## 2021-04-19 DIAGNOSIS — K29.00: Primary | ICD-10-CM

## 2021-04-19 DIAGNOSIS — Z91.041: ICD-10-CM

## 2021-04-19 LAB
ALBUMIN SERPL-MCNC: 3.9 G/DL (ref 3.4–5)
ALP SERPL-CCNC: 65 U/L (ref 45–117)
ALT SERPL-CCNC: 23 U/L (ref 12–78)
ANION GAP SERPL CALC-SCNC: 10 MMOL/L (ref 5–15)
BASOPHILS # BLD AUTO: 0.1 X10^3/UL (ref 0–0.1)
BASOPHILS NFR BLD AUTO: 1 % (ref 0–1)
BILIRUB SERPL-MCNC: 0.3 MG/DL (ref 0.2–1)
CALCIUM SERPL-MCNC: 9.2 MG/DL (ref 8.5–10.1)
CHLORIDE SERPL-SCNC: 103 MMOL/L (ref 98–107)
CREAT SERPL-MCNC: 0.79 MG/DL (ref 0.55–1.02)
EOSINOPHIL # BLD AUTO: 0.6 X10^3/UL (ref 0–0.4)
EOSINOPHIL NFR BLD AUTO: 5 % (ref 1–7)
ERYTHROCYTE [DISTWIDTH] IN BLOOD BY AUTOMATED COUNT: 12.8 % (ref 9.6–15.2)
LYMPHOCYTES # BLD AUTO: 2.7 X10^3/UL (ref 1–3.4)
LYMPHOCYTES NFR BLD AUTO: 24 % (ref 22–44)
MCH RBC QN AUTO: 30.5 PG (ref 27–34.8)
MCHC RBC AUTO-ENTMCNC: 34.4 G/DL (ref 32.4–35.8)
MD: NO
MONOCYTES # BLD AUTO: 0.9 X10^3/UL (ref 0.2–0.8)
MONOCYTES NFR BLD AUTO: 8 % (ref 2–9)
NEUTROPHILS # BLD AUTO: 7.2 X10^3/UL (ref 1.8–6.8)
NEUTROPHILS NFR BLD AUTO: 63 % (ref 42–75)
PLATELET # BLD AUTO: 363 X10^3/UL (ref 130–400)
PMV BLD AUTO: 8.3 FL (ref 7.4–10.4)
PROT SERPL-MCNC: 7.7 G/DL (ref 6.4–8.2)
RBC # BLD AUTO: 4.76 X10^6/UL (ref 3.82–5.3)

## 2021-04-19 PROCEDURE — 99284 EMERGENCY DEPT VISIT MOD MDM: CPT

## 2021-04-19 PROCEDURE — 83690 ASSAY OF LIPASE: CPT

## 2021-04-19 PROCEDURE — 80053 COMPREHEN METABOLIC PANEL: CPT

## 2021-04-19 PROCEDURE — 96361 HYDRATE IV INFUSION ADD-ON: CPT

## 2021-04-19 PROCEDURE — 96374 THER/PROPH/DIAG INJ IV PUSH: CPT

## 2021-04-19 PROCEDURE — 76700 US EXAM ABDOM COMPLETE: CPT

## 2021-04-19 PROCEDURE — 84703 CHORIONIC GONADOTROPIN ASSAY: CPT

## 2021-04-19 PROCEDURE — 36415 COLL VENOUS BLD VENIPUNCTURE: CPT

## 2021-04-19 PROCEDURE — 85025 COMPLETE CBC W/AUTO DIFF WBC: CPT

## 2021-04-19 PROCEDURE — 96375 TX/PRO/DX INJ NEW DRUG ADDON: CPT

## 2021-04-19 NOTE — NUR
RUQ CRAMPING PAIN WITH EATING X 7 DAYS. ESPECIALLY PAINFUL TODAY (SINCE 7P 
(LAST MEAL 7P CHICKEN/AVACADO)

  HX OF APPENDECTOMY

PIV PLACED FROM WHICH LABS WERE DRAWN

PATIENT THEN MEDICATED PER EMAR FOR 10/10 PAIN/NAUSE

## 2021-04-20 VITALS — DIASTOLIC BLOOD PRESSURE: 68 MMHG | SYSTOLIC BLOOD PRESSURE: 103 MMHG

## 2021-04-20 NOTE — NUR
PATIENT REFUSED GI COCKTAIL

 PROVIDED WITH WATER/CRACKER FOR PO CHALLENGE

ERP TO BEDSIDE FOR REVIEW OF TESTING

## 2021-05-17 ENCOUNTER — HOSPITAL ENCOUNTER (EMERGENCY)
Dept: HOSPITAL 8 - ED | Age: 31
Discharge: HOME | End: 2021-05-17
Payer: SELF-PAY

## 2021-05-17 VITALS — BODY MASS INDEX: 34.97 KG/M2 | HEIGHT: 62 IN | WEIGHT: 190.04 LBS

## 2021-05-17 VITALS — DIASTOLIC BLOOD PRESSURE: 76 MMHG | SYSTOLIC BLOOD PRESSURE: 110 MMHG
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
## 2021-05-17 DIAGNOSIS — R19.7: ICD-10-CM
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
## 2021-05-17 DIAGNOSIS — S29.012A: Primary | ICD-10-CM

## 2021-05-17 DIAGNOSIS — R60.0: ICD-10-CM

## 2021-05-17 LAB
ALBUMIN SERPL-MCNC: 3.4 G/DL (ref 3.4–5)
ALP SERPL-CCNC: 52 U/L (ref 45–117)
ALT SERPL-CCNC: 23 U/L (ref 12–78)
ANION GAP SERPL CALC-SCNC: 4 MMOL/L (ref 5–15)
BASOPHILS # BLD AUTO: 0.1 X10^3/UL (ref 0–0.1)
BASOPHILS NFR BLD AUTO: 1 % (ref 0–1)
BILIRUB SERPL-MCNC: 0.3 MG/DL (ref 0.2–1)
CALCIUM SERPL-MCNC: 8.9 MG/DL (ref 8.5–10.1)
CHLORIDE SERPL-SCNC: 108 MMOL/L (ref 98–107)
CREAT SERPL-MCNC: 0.55 MG/DL (ref 0.55–1.02)
EOSINOPHIL # BLD AUTO: 0.8 X10^3/UL (ref 0–0.4)
EOSINOPHIL NFR BLD AUTO: 10 % (ref 1–7)
ERYTHROCYTE [DISTWIDTH] IN BLOOD BY AUTOMATED COUNT: 13.1 % (ref 9.6–15.2)
LYMPHOCYTES # BLD AUTO: 1.9 X10^3/UL (ref 1–3.4)
LYMPHOCYTES NFR BLD AUTO: 25 % (ref 22–44)
MCH RBC QN AUTO: 30.6 PG (ref 27–34.8)
MCHC RBC AUTO-ENTMCNC: 34.2 G/DL (ref 32.4–35.8)
MD: NO
MICROSCOPIC: (no result)
MONOCYTES # BLD AUTO: 0.6 X10^3/UL (ref 0.2–0.8)
MONOCYTES NFR BLD AUTO: 8 % (ref 2–9)
NEUTROPHILS # BLD AUTO: 4.3 X10^3/UL (ref 1.8–6.8)
NEUTROPHILS NFR BLD AUTO: 56 % (ref 42–75)
PLATELET # BLD AUTO: 325 X10^3/UL (ref 130–400)
PMV BLD AUTO: 7.8 FL (ref 7.4–10.4)
PROT SERPL-MCNC: 6.6 G/DL (ref 6.4–8.2)
RBC # BLD AUTO: 4.47 X10^6/UL (ref 3.82–5.3)

## 2021-05-17 PROCEDURE — 87086 URINE CULTURE/COLONY COUNT: CPT

## 2021-05-17 PROCEDURE — 81001 URINALYSIS AUTO W/SCOPE: CPT

## 2021-05-17 PROCEDURE — 84703 CHORIONIC GONADOTROPIN ASSAY: CPT

## 2021-05-17 PROCEDURE — 99285 EMERGENCY DEPT VISIT HI MDM: CPT

## 2021-05-17 PROCEDURE — 71045 X-RAY EXAM CHEST 1 VIEW: CPT

## 2021-05-17 PROCEDURE — 93970 EXTREMITY STUDY: CPT

## 2021-05-17 PROCEDURE — 80053 COMPREHEN METABOLIC PANEL: CPT

## 2021-05-17 PROCEDURE — 36415 COLL VENOUS BLD VENIPUNCTURE: CPT

## 2021-05-17 PROCEDURE — 85025 COMPLETE CBC W/AUTO DIFF WBC: CPT

## 2021-05-17 PROCEDURE — 93005 ELECTROCARDIOGRAM TRACING: CPT

## 2021-05-17 NOTE — NUR
PT PRESENTS TO ED WITH C/O BILATERAL LE SWELLING. PT STATES SHE CAME FROM . 
PT DENIES ANY PAIN, NUMBNESS OR TINGLING. CMS INTACT. PT A&O, RESPS EVEN AND 
UNLABORED, VSS, NADN.

## 2021-05-17 NOTE — NUR
PT RESTING IN BED, A&O, RESPS EVEN AND UNLABORED, VSS, MONITORS ATTACHED, NSR, 
NADN. AWAITING LAB RESULTS AND DISPO.

## 2021-07-27 ENCOUNTER — HOSPITAL ENCOUNTER (EMERGENCY)
Facility: MEDICAL CENTER | Age: 31
End: 2021-07-27
Payer: COMMERCIAL

## 2021-07-27 VITALS
WEIGHT: 168 LBS | BODY MASS INDEX: 31.72 KG/M2 | HEART RATE: 82 BPM | HEIGHT: 61 IN | TEMPERATURE: 96.8 F | SYSTOLIC BLOOD PRESSURE: 106 MMHG | DIASTOLIC BLOOD PRESSURE: 62 MMHG | OXYGEN SATURATION: 100 % | RESPIRATION RATE: 14 BRPM

## 2021-07-27 LAB — EKG IMPRESSION: NORMAL

## 2021-07-27 PROCEDURE — 93005 ELECTROCARDIOGRAM TRACING: CPT

## 2021-07-27 PROCEDURE — 302449 STATCHG TRIAGE ONLY (STATISTIC)

## 2021-07-27 ASSESSMENT — FIBROSIS 4 INDEX: FIB4 SCORE: 0.37

## 2021-07-27 NOTE — ED TRIAGE NOTES
"Patient vital signs rechecked and documented per [facility]. Patient denies any new needs at this time.  Patient updated on wait times, thanked for patience. Pt informed to alert triage tech or triage RN with any needs and/or changes in condition; patient verbalized understanding.   Patient stated \"my palpations feel like they are getting worse. No pain though.\"   "

## 2021-07-27 NOTE — ED TRIAGE NOTES
Chief Complaint   Patient presents with   • Palpitations     Heart racing this morning and woke patient up out of sleep. She has lost 12 pounds in last two weeks. When patient stands up HR increases and she feels SOB. She has had constant nausea throughout the pregnancy so far. Also notes bilateral ankle swelling. 9-10 weeks pregnant. 1st US schedulded for Friday

## 2021-08-18 ENCOUNTER — HOSPITAL ENCOUNTER (OUTPATIENT)
Facility: MEDICAL CENTER | Age: 31
End: 2021-08-19
Attending: EMERGENCY MEDICINE | Admitting: STUDENT IN AN ORGANIZED HEALTH CARE EDUCATION/TRAINING PROGRAM

## 2021-08-18 ENCOUNTER — APPOINTMENT (OUTPATIENT)
Dept: RADIOLOGY | Facility: MEDICAL CENTER | Age: 31
End: 2021-08-18
Attending: STUDENT IN AN ORGANIZED HEALTH CARE EDUCATION/TRAINING PROGRAM

## 2021-08-18 DIAGNOSIS — R55 NEAR SYNCOPE: ICD-10-CM

## 2021-08-18 DIAGNOSIS — R00.2 PALPITATIONS: ICD-10-CM

## 2021-08-18 DIAGNOSIS — I95.1 ORTHOSTATIC HYPOTENSION: ICD-10-CM

## 2021-08-18 PROBLEM — Z3A.11 11 WEEKS GESTATION OF PREGNANCY: Status: ACTIVE | Noted: 2021-08-18

## 2021-08-18 PROBLEM — Z86.711 HISTORY OF PULMONARY EMBOLISM: Status: ACTIVE | Noted: 2021-08-18

## 2021-08-18 PROBLEM — R42 POSTURAL DIZZINESS WITH PRESYNCOPE: Status: ACTIVE | Noted: 2021-08-18

## 2021-08-18 PROBLEM — R00.0 TACHYCARDIA: Status: ACTIVE | Noted: 2021-08-18

## 2021-08-18 LAB
ANION GAP SERPL CALC-SCNC: 13 MMOL/L (ref 7–16)
APPEARANCE UR: ABNORMAL
BACTERIA #/AREA URNS HPF: ABNORMAL /HPF
BASOPHILS # BLD AUTO: 0.4 % (ref 0–1.8)
BASOPHILS # BLD: 0.04 K/UL (ref 0–0.12)
BILIRUB UR QL STRIP.AUTO: NEGATIVE
BUN SERPL-MCNC: 4 MG/DL (ref 8–22)
CALCIUM SERPL-MCNC: 8.9 MG/DL (ref 8.5–10.5)
CHLORIDE SERPL-SCNC: 104 MMOL/L (ref 96–112)
CO2 SERPL-SCNC: 18 MMOL/L (ref 20–33)
COLOR UR: YELLOW
CREAT SERPL-MCNC: 0.48 MG/DL (ref 0.5–1.4)
EKG IMPRESSION: NORMAL
EOSINOPHIL # BLD AUTO: 0.18 K/UL (ref 0–0.51)
EOSINOPHIL NFR BLD: 1.6 % (ref 0–6.9)
EPI CELLS #/AREA URNS HPF: ABNORMAL /HPF
ERYTHROCYTE [DISTWIDTH] IN BLOOD BY AUTOMATED COUNT: 39 FL (ref 35.9–50)
GLUCOSE SERPL-MCNC: 80 MG/DL (ref 65–99)
GLUCOSE UR STRIP.AUTO-MCNC: >=1000 MG/DL
HCT VFR BLD AUTO: 39.9 % (ref 37–47)
HGB BLD-MCNC: 13.9 G/DL (ref 12–16)
HYALINE CASTS #/AREA URNS LPF: ABNORMAL /LPF
IMM GRANULOCYTES # BLD AUTO: 0.09 K/UL (ref 0–0.11)
IMM GRANULOCYTES NFR BLD AUTO: 0.8 % (ref 0–0.9)
KETONES UR STRIP.AUTO-MCNC: >=160 MG/DL
LEUKOCYTE ESTERASE UR QL STRIP.AUTO: ABNORMAL
LYMPHOCYTES # BLD AUTO: 1.56 K/UL (ref 1–4.8)
LYMPHOCYTES NFR BLD: 13.8 % (ref 22–41)
MCH RBC QN AUTO: 30 PG (ref 27–33)
MCHC RBC AUTO-ENTMCNC: 34.8 G/DL (ref 33.6–35)
MCV RBC AUTO: 86 FL (ref 81.4–97.8)
MICRO URNS: ABNORMAL
MONOCYTES # BLD AUTO: 0.56 K/UL (ref 0–0.85)
MONOCYTES NFR BLD AUTO: 4.9 % (ref 0–13.4)
NEUTROPHILS # BLD AUTO: 8.91 K/UL (ref 2–7.15)
NEUTROPHILS NFR BLD: 78.5 % (ref 44–72)
NITRITE UR QL STRIP.AUTO: NEGATIVE
NRBC # BLD AUTO: 0 K/UL
NRBC BLD-RTO: 0 /100 WBC
PH UR STRIP.AUTO: 5.5 [PH] (ref 5–8)
PLATELET # BLD AUTO: 324 K/UL (ref 164–446)
PMV BLD AUTO: 9.5 FL (ref 9–12.9)
POTASSIUM SERPL-SCNC: 3.7 MMOL/L (ref 3.6–5.5)
PROT UR QL STRIP: NEGATIVE MG/DL
RBC # BLD AUTO: 4.64 M/UL (ref 4.2–5.4)
RBC # URNS HPF: ABNORMAL /HPF
RBC UR QL AUTO: NEGATIVE
SARS-COV+SARS-COV-2 AG RESP QL IA.RAPID: NOTDETECTED
SODIUM SERPL-SCNC: 135 MMOL/L (ref 135–145)
SP GR UR STRIP.AUTO: 1.03
SPECIMEN SOURCE: NORMAL
UROBILINOGEN UR STRIP.AUTO-MCNC: 0.2 MG/DL
WBC # BLD AUTO: 11.3 K/UL (ref 4.8–10.8)
WBC #/AREA URNS HPF: ABNORMAL /HPF

## 2021-08-18 PROCEDURE — 700111 HCHG RX REV CODE 636 W/ 250 OVERRIDE (IP): Performed by: STUDENT IN AN ORGANIZED HEALTH CARE EDUCATION/TRAINING PROGRAM

## 2021-08-18 PROCEDURE — G0378 HOSPITAL OBSERVATION PER HR: HCPCS

## 2021-08-18 PROCEDURE — 36415 COLL VENOUS BLD VENIPUNCTURE: CPT

## 2021-08-18 PROCEDURE — 71045 X-RAY EXAM CHEST 1 VIEW: CPT

## 2021-08-18 PROCEDURE — 80048 BASIC METABOLIC PNL TOTAL CA: CPT

## 2021-08-18 PROCEDURE — 99285 EMERGENCY DEPT VISIT HI MDM: CPT

## 2021-08-18 PROCEDURE — 93005 ELECTROCARDIOGRAM TRACING: CPT | Performed by: EMERGENCY MEDICINE

## 2021-08-18 PROCEDURE — 700105 HCHG RX REV CODE 258: Performed by: STUDENT IN AN ORGANIZED HEALTH CARE EDUCATION/TRAINING PROGRAM

## 2021-08-18 PROCEDURE — 96372 THER/PROPH/DIAG INJ SC/IM: CPT

## 2021-08-18 PROCEDURE — 93005 ELECTROCARDIOGRAM TRACING: CPT

## 2021-08-18 PROCEDURE — 87426 SARSCOV CORONAVIRUS AG IA: CPT

## 2021-08-18 PROCEDURE — 85025 COMPLETE CBC W/AUTO DIFF WBC: CPT

## 2021-08-18 PROCEDURE — 99220 PR INITIAL OBSERVATION CARE,LEVL III: CPT | Performed by: STUDENT IN AN ORGANIZED HEALTH CARE EDUCATION/TRAINING PROGRAM

## 2021-08-18 PROCEDURE — 81001 URINALYSIS AUTO W/SCOPE: CPT

## 2021-08-18 PROCEDURE — 700105 HCHG RX REV CODE 258: Performed by: EMERGENCY MEDICINE

## 2021-08-18 RX ORDER — HEPARIN SODIUM 5000 [USP'U]/ML
5000 INJECTION, SOLUTION INTRAVENOUS; SUBCUTANEOUS EVERY 8 HOURS
Status: DISCONTINUED | OUTPATIENT
Start: 2021-08-18 | End: 2021-08-19 | Stop reason: HOSPADM

## 2021-08-18 RX ORDER — SODIUM CHLORIDE 9 MG/ML
1000 INJECTION, SOLUTION INTRAVENOUS ONCE
Status: COMPLETED | OUTPATIENT
Start: 2021-08-18 | End: 2021-08-18

## 2021-08-18 RX ORDER — AMOXICILLIN 250 MG
2 CAPSULE ORAL 2 TIMES DAILY
Status: DISCONTINUED | OUTPATIENT
Start: 2021-08-18 | End: 2021-08-19 | Stop reason: HOSPADM

## 2021-08-18 RX ORDER — BISACODYL 10 MG
10 SUPPOSITORY, RECTAL RECTAL
Status: DISCONTINUED | OUTPATIENT
Start: 2021-08-18 | End: 2021-08-19 | Stop reason: HOSPADM

## 2021-08-18 RX ORDER — POLYETHYLENE GLYCOL 3350 17 G/17G
1 POWDER, FOR SOLUTION ORAL
Status: DISCONTINUED | OUTPATIENT
Start: 2021-08-18 | End: 2021-08-19 | Stop reason: HOSPADM

## 2021-08-18 RX ORDER — SODIUM CHLORIDE 9 MG/ML
INJECTION, SOLUTION INTRAVENOUS CONTINUOUS
Status: DISCONTINUED | OUTPATIENT
Start: 2021-08-18 | End: 2021-08-19 | Stop reason: HOSPADM

## 2021-08-18 RX ORDER — ONDANSETRON 4 MG/1
4 TABLET, ORALLY DISINTEGRATING ORAL EVERY 4 HOURS PRN
Status: DISCONTINUED | OUTPATIENT
Start: 2021-08-18 | End: 2021-08-19 | Stop reason: HOSPADM

## 2021-08-18 RX ORDER — PROCHLORPERAZINE EDISYLATE 5 MG/ML
5-10 INJECTION INTRAMUSCULAR; INTRAVENOUS EVERY 4 HOURS PRN
Status: DISCONTINUED | OUTPATIENT
Start: 2021-08-18 | End: 2021-08-19 | Stop reason: HOSPADM

## 2021-08-18 RX ORDER — PROMETHAZINE HYDROCHLORIDE 25 MG/1
12.5-25 TABLET ORAL EVERY 4 HOURS PRN
Status: DISCONTINUED | OUTPATIENT
Start: 2021-08-18 | End: 2021-08-19 | Stop reason: HOSPADM

## 2021-08-18 RX ORDER — ONDANSETRON 2 MG/ML
4 INJECTION INTRAMUSCULAR; INTRAVENOUS EVERY 4 HOURS PRN
Status: DISCONTINUED | OUTPATIENT
Start: 2021-08-18 | End: 2021-08-19 | Stop reason: HOSPADM

## 2021-08-18 RX ORDER — DEXTROSE AND SODIUM CHLORIDE 5; .9 G/100ML; G/100ML
INJECTION, SOLUTION INTRAVENOUS CONTINUOUS
Status: ACTIVE | OUTPATIENT
Start: 2021-08-18 | End: 2021-08-18

## 2021-08-18 RX ORDER — ACETAMINOPHEN 325 MG/1
650 TABLET ORAL EVERY 6 HOURS PRN
Status: DISCONTINUED | OUTPATIENT
Start: 2021-08-18 | End: 2021-08-19 | Stop reason: HOSPADM

## 2021-08-18 RX ORDER — PROMETHAZINE HYDROCHLORIDE 25 MG/1
12.5-25 SUPPOSITORY RECTAL EVERY 4 HOURS PRN
Status: DISCONTINUED | OUTPATIENT
Start: 2021-08-18 | End: 2021-08-19 | Stop reason: HOSPADM

## 2021-08-18 RX ADMIN — HEPARIN SODIUM 5000 UNITS: 5000 INJECTION, SOLUTION INTRAVENOUS; SUBCUTANEOUS at 18:02

## 2021-08-18 RX ADMIN — SODIUM CHLORIDE: 9 INJECTION, SOLUTION INTRAVENOUS at 15:28

## 2021-08-18 RX ADMIN — DEXTROSE AND SODIUM CHLORIDE: 5; 900 INJECTION, SOLUTION INTRAVENOUS at 11:04

## 2021-08-18 RX ADMIN — SODIUM CHLORIDE 1000 ML: 9 INJECTION, SOLUTION INTRAVENOUS at 12:22

## 2021-08-18 RX ADMIN — SODIUM CHLORIDE 1000 ML: 9 INJECTION, SOLUTION INTRAVENOUS at 09:59

## 2021-08-18 ASSESSMENT — ENCOUNTER SYMPTOMS
VOMITING: 1
CHILLS: 0
INSOMNIA: 0
DIZZINESS: 0
SHORTNESS OF BREATH: 0
FEVER: 0
FOCAL WEAKNESS: 0
ABDOMINAL PAIN: 0
NAUSEA: 1
EYE PAIN: 0
PALPITATIONS: 1
BLURRED VISION: 0
HEADACHES: 0
FALLS: 0
BACK PAIN: 0
COUGH: 0
SENSORY CHANGE: 0

## 2021-08-18 ASSESSMENT — FIBROSIS 4 INDEX
FIB4 SCORE: 0.37
FIB4 SCORE: 0.35

## 2021-08-18 ASSESSMENT — LIFESTYLE VARIABLES
HAVE PEOPLE ANNOYED YOU BY CRITICIZING YOUR DRINKING: NO
TOTAL SCORE: 0
SUBSTANCE_ABUSE: 0
TOTAL SCORE: 0
CONSUMPTION TOTAL: NEGATIVE
HOW MANY TIMES IN THE PAST YEAR HAVE YOU HAD 5 OR MORE DRINKS IN A DAY: 0
EVER HAD A DRINK FIRST THING IN THE MORNING TO STEADY YOUR NERVES TO GET RID OF A HANGOVER: NO
EVER FELT BAD OR GUILTY ABOUT YOUR DRINKING: NO
HAVE YOU EVER FELT YOU SHOULD CUT DOWN ON YOUR DRINKING: NO
AVERAGE NUMBER OF DAYS PER WEEK YOU HAVE A DRINK CONTAINING ALCOHOL: 0
DO YOU DRINK ALCOHOL: NO
TOTAL SCORE: 0
ALCOHOL_USE: NO
ON A TYPICAL DAY WHEN YOU DRINK ALCOHOL HOW MANY DRINKS DO YOU HAVE: 0

## 2021-08-18 ASSESSMENT — PATIENT HEALTH QUESTIONNAIRE - PHQ9
2. FEELING DOWN, DEPRESSED, IRRITABLE, OR HOPELESS: NOT AT ALL
SUM OF ALL RESPONSES TO PHQ9 QUESTIONS 1 AND 2: 0
1. LITTLE INTEREST OR PLEASURE IN DOING THINGS: NOT AT ALL

## 2021-08-18 NOTE — ED TRIAGE NOTES
"Chief Complaint   Patient presents with   • Shortness of Breath     after sitting up and with exertion begining yesterday.    • Palpitations     Pt report better when laying flat.      Pt to triage from lobby with above complaint.  Pt 12-14 weeks pregnant with US confirmation.  Pt reports calling OB yesterday with concerns as pt is \"high risk pregnancy\" and was told to hydrate and re-evaluate today.  Pt reports unable to keep fluid or food down due to N/v.     /81   Pulse (!) 122   Temp 36.2 °C (97.2 °F) (Temporal)   Resp 18   Ht 1.549 m (5' 1\")   Wt 76.9 kg (169 lb 8.5 oz)   LMP 12/15/2020 (Exact Date)   SpO2 98%   BMI 32.03 kg/m²     "

## 2021-08-18 NOTE — ED NOTES
Med rec completed per Pt at bedside.  Allergies reviewed with Pt.  Pt denies taking any prescription medications.  No recent over-the-counter medications.  No oral antibiotics in last 30 days.  Pt's preferred pharmacy: [facility] on [address].

## 2021-08-18 NOTE — ASSESSMENT & PLAN NOTE
Unclear etiology  Possibly compensatory due to orthostatic hypotension  Check orthostatic vitals  Monitor on telemetry, VQ scan as above

## 2021-08-18 NOTE — H&P
[facility] Medicine History & Physical Note    Date of Service  8/18/2021    Primary Care Physician  LEANA Linares.    Consultants  none    Code Status  Full Code    Chief Complaint  Chief Complaint   Patient presents with   • Shortness of Breath     after sitting up and with exertion begining yesterday.    • Palpitations     Pt report better when laying flat.        History of Presenting Illness  Flaca Nickerson is a 31 y.o. female who presented 8/18/2021 with palpitations, presyncope. Patient with history of Behcet's disease, ?pulmonary embolism at age 16, current pregnancy at 11 weeks gestation who presents for palpitations and presyncope. Patient reports palpitations when sitting up, orthostatic dizziness with presyncope for past 2 days. She also reports sharp right sided chest pain, like glass, worse with deep breaths, which has now resolved. She denies other chest discomfort, dyspnea, orthopnea, leg swelling; she also denies any recent surgery/immobility, known malignancy, hemoptysis. She reports some morning sickness and nausea with vomiting although this has not been severe. No recent fluid loss or diarrhea.  In the ED, patient occasionally tachycardic to 130's, otherwise vital signs stable. WBC 11, EKG with normal sinus rhythm HR 97. Patient given 3L IV fluids by ERP, however she remains orthostatic with occasional borderline hypotension 90/50's. She will be admitted for evaluation of presyncope.    I discussed the plan of care with patient.    Review of Systems  Review of Systems   Constitutional: Negative for chills and fever.   Eyes: Negative for blurred vision and pain.   Respiratory: Negative for cough and shortness of breath.    Cardiovascular: Positive for chest pain and palpitations. Negative for leg swelling.   Gastrointestinal: Positive for nausea and vomiting. Negative for abdominal pain.   Genitourinary: Negative for dysuria and urgency.   Musculoskeletal: Negative for back pain  "and falls.   Skin: Negative for itching and rash.   Neurological: Negative for dizziness, sensory change, focal weakness and headaches.   Psychiatric/Behavioral: Negative for substance abuse. The patient does not have insomnia.        Past Medical History   has a past medical history of Abdominal mass (9/10/2013), Adenomatous polyps, ASTHMA, Behcet's disease (HCC), Behcet's disease (HCC), Colon polyps (9/10/2013), HYPOTENSION, Migraine, Mouth ulcer, Palpitation, Palpitations, Pulmonary embolism (HCC) (01/01/2009), SVT (supraventricular tachycardia) ([facility]), and Typhoid fever.    Surgical History   has a past surgical history that includes tonsillectomy (2004) and appendectomy.     Family History  family history includes Cancer (age of onset: 35) in her maternal aunt; Diabetes in her maternal grandmother; Heart Disease in her father and paternal grandfather; Hypertension in her maternal grandfather and maternal grandmother; Thyroid in her mother.   Family history reviewed with patient. There is no family history that is pertinent to the chief complaint.     Social History   reports that she has never smoked. She has never used smokeless tobacco. She reports previous alcohol use. She reports that she does not use drugs.    Allergies  Allergies   Allergen Reactions   • Iodine Hives and Rash         • Morphine Rash     \"Skin burny rash.\"       Medications  None       Physical Exam  Temp:  [36.2 °C (97.2 °F)] 36.2 °C (97.2 °F)  Pulse:  [] 87  Resp:  [17-18] 17  BP: ()/(51-81) 117/59  SpO2:  [98 %-100 %] 99 %    Physical Exam  Vitals reviewed.   Constitutional:       General: She is not in acute distress.     Appearance: She is not diaphoretic.   HENT:      Head: Normocephalic and atraumatic.      Right Ear: External ear normal.      Left Ear: External ear normal.      Nose: Nose normal. No congestion.      Mouth/Throat:      Pharynx: No oropharyngeal exudate or posterior oropharyngeal erythema.   Eyes:      " Extraocular Movements: Extraocular movements intact.      Pupils: Pupils are equal, round, and reactive to light.   Cardiovascular:      Rate and Rhythm: Regular rhythm. Tachycardia present.   Pulmonary:      Effort: Pulmonary effort is normal. No respiratory distress.      Breath sounds: Normal breath sounds. No wheezing or rales.   Chest:      Chest wall: No tenderness.   Abdominal:      General: Bowel sounds are normal. There is distension.      Palpations: Abdomen is soft.      Tenderness: There is no abdominal tenderness. There is no guarding.      Comments: Distended consistent with 11 week gestation   Musculoskeletal:         General: No swelling. Normal range of motion.      Cervical back: Normal range of motion and neck supple.   Skin:     General: Skin is warm and dry.   Neurological:      General: No focal deficit present.      Mental Status: She is alert and oriented to person, place, and time.      Sensory: No sensory deficit.      Motor: No weakness.   Psychiatric:         Mood and Affect: Mood normal.         Behavior: Behavior normal.         Laboratory:  Recent Labs     08/18/21  1000   WBC 11.3*   RBC 4.64   HEMOGLOBIN 13.9   HEMATOCRIT 39.9   MCV 86.0   MCH 30.0   MCHC 34.8   RDW 39.0   PLATELETCT 324   MPV 9.5     Recent Labs     08/18/21  1000   SODIUM 135   POTASSIUM 3.7   CHLORIDE 104   CO2 18*   GLUCOSE 80   BUN 4*   CREATININE 0.48*   CALCIUM 8.9     Recent Labs     08/18/21  1000   GLUCOSE 80         No results for input(s): NTPROBNP in the last 72 hours.      No results for input(s): TROPONINT in the last 72 hours.    Imaging:  DX-CHEST-2 VIEWS    (Results Pending)       EKG:  I have personally reviewed the images and compared with prior images.    Assessment/Plan:  I anticipate this patient is appropriate for observation status at this time.    * Postural dizziness with presyncope  Assessment & Plan  Presented for presyncope, palpitations for past two days  Patient occasionally  tachycardic with borderline hypotension  EKG normal sinus rhythm, HR 97  Given 3L IV fluids in ER  Admit to observation for telemetry monitoring  Possibly related to dehydration, third spacing from pregnancy; however no significant fluid loss per patient  Patient with hx of Behcet's disease and pulmonary embolism age 16?  Well's score moderate for pulmonary embolism given history of embolism and tachycardia  VQ scan ordered to evaluate for embolism in pregnant patient, VQ scan cancelled as patient uncomfortable with radiation/contrast imaging until she has spoken with her OB doctor.  CXR ordered for now; will reorder CTA vs VQ scan if patient feels comfortable with imaging, otherwise monitor clinically  Echocardiogram in 1/2021 grossly normal, will hold off on repeat for now  Continue maintenance IV fluids, telemetry monitoring    History of pulmonary embolism  Assessment & Plan  History of PE at age 16? In setting of Behcet's disease    11 weeks gestation of pregnancy  Assessment & Plan  Reported per patient, normal pregnancy thus far per patient  No complaints  Follow up with OB as outpatient    Tachycardia  Assessment & Plan  Unclear etiology  Possibly compensatory due to orthostatic hypotension  Check orthostatic vitals  Monitor on telemetry, VQ scan as above      VTE prophylaxis: heparin ppx

## 2021-08-18 NOTE — ASSESSMENT & PLAN NOTE
Reported per patient, normal pregnancy thus far per patient  No complaints  Follow up with OB as outpatient

## 2021-08-18 NOTE — ASSESSMENT & PLAN NOTE
Presented for presyncope, palpitations for past two days  Patient occasionally tachycardic with borderline hypotension  EKG normal sinus rhythm, HR 97  Given 3L IV fluids in ER  Admit to observation for telemetry monitoring  Possibly related to dehydration, third spacing from pregnancy; however no significant fluid loss per patient  Patient with hx of Behcet's disease and pulmonary embolism age 16?  Well's score moderate for pulmonary embolism given history of embolism and tachycardia  VQ scan ordered to evaluate for embolism in pregnant patient, VQ scan cancelled as patient uncomfortable with radiation/contrast imaging until she has spoken with her OB doctor.  CXR ordered for now; will reorder CTA vs VQ scan if patient feels comfortable with imaging, otherwise monitor clinically  Echocardiogram in 1/2021 grossly normal, will hold off on repeat for now  Continue maintenance IV fluids, telemetry monitoring

## 2021-08-18 NOTE — ED PROVIDER NOTES
"ED Provider Note    Scribed for Ovidio Guerra M.D. by Lencho Nassar. 8/18/2021  9:50 AM    Primary care provider: DEVANTE Linares  Means of arrival: Walk in  History obtained from: Patient  History limited by: None    CHIEF COMPLAINT  Chief Complaint   Patient presents with    Shortness of Breath     after sitting up and with exertion begining yesterday.     Palpitations     Pt report better when laying flat.        HPI  Flaca Nickerson is a 31 y.o. female who presents to the Emergency Department for evaluation of dehydration onset three days ago. Patient is 12 weeks pregnant. She admits to associated symptoms of vomiting, shortness of breath, worsening palpitations, sharp chest pain \"like glass\", chest pressure once sitting upright, lightheadedness, and vision changes, but denies fever or chills. Patient's vomiting has subsided after 48 hours. When patient walks, she begins feeling lightheaded as if she is about to pass out and her vision turns white. Patient sees her OB regularly. No alleviating factors were reported.     REVIEW OF SYSTEMS  Pertinent positives include vomiting, shortness of breath, palpitations, chest pain, chest pressure, lightheadedness, and vision changes.   Pertinent negatives include no fever or chills.    All other systems reviewed and negative. See HPI for further details.     PAST MEDICAL HISTORY   has a past medical history of Abdominal mass (9/10/2013), Adenomatous polyps, ASTHMA, Behcet's disease (HCC), Behcet's disease (HCC), Colon polyps (9/10/2013), HYPOTENSION, Migraine, Mouth ulcer, Palpitation, Palpitations, Pulmonary embolism (HCC) (01/01/2009), SVT (supraventricular tachycardia) ([facility]), and Typhoid fever.    SURGICAL HISTORY   has a past surgical history that includes tonsillectomy (2004) and appendectomy.    SOCIAL HISTORY  Social History     Tobacco Use    Smoking status: Never Smoker    Smokeless tobacco: Never Used   Vaping Use    Vaping Use: Never " "used   Substance Use Topics    Alcohol use: Not Currently     Alcohol/week: 0.0 oz     Comment: rarely    Drug use: No      Social History     Substance and Sexual Activity   Drug Use No       FAMILY HISTORY  Family History   Problem Relation Age of Onset    Heart Disease Father     Thyroid Mother     Diabetes Maternal Grandmother     Hypertension Maternal Grandmother     Hypertension Maternal Grandfather     Heart Disease Paternal Grandfather     Cancer Maternal Aunt 35        breast ca       CURRENT MEDICATIONS  Home Medications       Reviewed by Grecia Barahona R.N. (Registered Nurse) on 08/18/21 at 0914  Med List Status: Partial     Medication Last Dose Status        Patient Landry Taking any Medications                           ALLERGIES  Allergies   Allergen Reactions    Iodine Hives and Rash          Morphine Rash     \"Skin burny rash.\"       PHYSICAL EXAM  VITAL SIGNS: /81   Pulse (!) 122   Temp 36.2 °C (97.2 °F) (Temporal)   Resp 18   Ht 1.549 m (5' 1\")   Wt 76.9 kg (169 lb 8.5 oz)   LMP 12/15/2020 (Exact Date)   SpO2 98%   BMI 32.03 kg/m²     Nursing note and vitals reviewed.  Constitutional: Well-developed and well-nourished. No distress.   HENT: Head is normocephalic and atraumatic. Oropharynx is clear and moist without exudate or erythema.   Eyes: Pupils are equal, round, and reactive to light. Conjunctiva are normal.   Cardiovascular: Tachycardic, regular rhythm. No murmur heard. Normal radial pulses.  Pulmonary/Chest: Breath sounds normal. No wheezes or rales.   Abdominal: Soft and non-tender. No distention    Musculoskeletal: Extremities exhibit normal range of motion without edema or tenderness.   Neurological: Awake, alert and oriented to person, place, and time. No focal deficits noted.  Skin: Skin is warm and dry. No rash.   Psychiatric: Normal mood and affect. Appropriate for clinical situation.    DIAGNOSTIC STUDIES / PROCEDURES    EKG Interpretation  Results for orders placed or " performed during the hospital encounter of 21   EKG (NOW)   Result Value Ref Range    Report       [facility] Emergency Dept.    Test Date:  2021  Pt Name:    BENJIE LYLE            Department: ER  MRN:        [id number]                      Room:  Gender:     Female                       Technician: [id number]  :        1990                   Requested By:ER TRIAGE PROTOCOL  Order #:    [id number]                    Reading MD: SEDRICK BURNETT MD    Measurements  Intervals                                Axis  Rate:       97                           P:          47  CT:         152                          QRS:        43  QRSD:       74                           T:          -35  QT:         348  QTc:        442    Interpretive Statements  SINUS RHYTHM  BORDERLINE T ABNORMALITIES, INFERIOR LEADS  Compared to ECG 2021 09:46:00  No significant changes  Electronically Signed On 2021 10:00:51 PDT by SEDRICK BURNETT MD     12 Lead EKG interpreted by me as above.     LABS  Results for orders placed or performed during the hospital encounter of 21   EKG (NOW)   Result Value Ref Range    Report       [facility] Emergency Dept.    Test Date:  2021  Pt Name:    BENJIE LYLE            Department: ER  MRN:        [id number]                      Room:  Gender:     Female                       Technician: [id number]  :        1990                   Requested By:ER TRIAGE PROTOCOL  Order #:    [id number]                    Reading MD: SEDRICK BURNETT MD    Measurements  Intervals                                Axis  Rate:       97                           P:          47  CT:         152                          QRS:        43  QRSD:       74                           T:          -35  QT:         348  QTc:        442    Interpretive Statements  SINUS RHYTHM  BORDERLINE T ABNORMALITIES, INFERIOR LEADS  Compared to ECG 2021 09:46:00  No  significant changes  Electronically Signed On 8- 10:00:51 PDT by OVIDIO BURNETT MD         All labs reviewed by me.    COURSE & MEDICAL DECISION MAKING  Nursing notes, VS, PMSFHx reviewed in chart.     9:50 AM - Patient seen and examined at bedside. Patient will be treated with NS infusion 1,000 mL. Intravenous fluids were administered for dehydration.  Ordered CBC with diff, BMP, estimated GFR, and EKG to evaluate her symptoms.     10:52 AM - Ordered D5 NS infusion to treat patient.    12:13 PM - Ordered NS infusion 1,000 mL to treat patient.    12:28 PM - Paged Hospitalist.    12:46 PM I discussed the patient's case and the above findings with Dr. Liu (Hospitalist) who agrees to evaluate the patient.    HYDRATION: Based on the patient's presentation of Dehydration the patient was given IV fluids. IV Hydration was used because oral hydration was not adequate alone. Upon recheck following hydration, the patient remained the same.    DISPOSITION:  Patient will be hospitalized by Dr. Liu in guarded condition.      FINAL IMPRESSION  1. Palpitations    2. Near syncope    3. Orthostatic hypotension          Lencho DE PAZ (Shira), am scribing for, and in the presence of, Ovidio Burnett M.D..    Electronically signed by: Lencho Nassar (Shira), 8/18/2021    IOvidio M.D. personally performed the services described in this documentation, as scribed by Lencho Nassar in my presence, and it is both accurate and complete. C    The note accurately reflects work and decisions made by me.  Ovidio Burnett M.D.  8/18/2021  3:14 PM

## 2021-08-19 ENCOUNTER — APPOINTMENT (OUTPATIENT)
Dept: RADIOLOGY | Facility: MEDICAL CENTER | Age: 31
End: 2021-08-19
Attending: NURSE PRACTITIONER

## 2021-08-19 VITALS
TEMPERATURE: 96.8 F | HEIGHT: 61 IN | OXYGEN SATURATION: 95 % | DIASTOLIC BLOOD PRESSURE: 57 MMHG | HEART RATE: 87 BPM | BODY MASS INDEX: 40.5 KG/M2 | WEIGHT: 214.51 LBS | RESPIRATION RATE: 18 BRPM | SYSTOLIC BLOOD PRESSURE: 105 MMHG

## 2021-08-19 PROBLEM — R42 POSTURAL DIZZINESS WITH PRESYNCOPE: Status: RESOLVED | Noted: 2021-08-18 | Resolved: 2021-08-19

## 2021-08-19 PROBLEM — R00.0 TACHYCARDIA: Status: RESOLVED | Noted: 2021-08-18 | Resolved: 2021-08-19

## 2021-08-19 PROBLEM — R55 POSTURAL DIZZINESS WITH PRESYNCOPE: Status: RESOLVED | Noted: 2021-08-18 | Resolved: 2021-08-19

## 2021-08-19 LAB — D DIMER PPP IA.FEU-MCNC: 0.42 UG/ML (FEU) (ref 0–0.5)

## 2021-08-19 PROCEDURE — 85379 FIBRIN DEGRADATION QUANT: CPT

## 2021-08-19 PROCEDURE — G0378 HOSPITAL OBSERVATION PER HR: HCPCS

## 2021-08-19 PROCEDURE — 99217 PR OBSERVATION CARE DISCHARGE: CPT | Performed by: NURSE PRACTITIONER

## 2021-08-19 PROCEDURE — 93970 EXTREMITY STUDY: CPT | Mod: 26 | Performed by: INTERNAL MEDICINE

## 2021-08-19 PROCEDURE — 93970 EXTREMITY STUDY: CPT

## 2021-08-19 NOTE — PROGRESS NOTES
Pt's discharge paperwork printed out and gone over with the pt.  All questions answered.  Pt's IV was removed, and all items taken at discharge.  Pt was escorted out via wheelchair.

## 2021-08-19 NOTE — PROGRESS NOTES
Monitor summary per monitor tech report:    Sinus Rhythm rate 70-92  No ectopy noted  .15/.08/.36

## 2021-08-19 NOTE — PROGRESS NOTES
Report received from MOY Mcgee.  Patient brought up to the CDU from the Purple Pod of the ED.  POC gone over with the pt and all questions answered.  Patient A & O  X 4.  No apparent signs of distress.  Safety precautions in place.  Patient educated to call for assistance.  Hourly rounding in place.

## 2021-08-19 NOTE — DISCHARGE SUMMARY
Discharge Summary    CHIEF COMPLAINT ON ADMISSION  Chief Complaint   Patient presents with   • Shortness of Breath     after sitting up and with exertion begining yesterday.    • Palpitations     Pt report better when laying flat.        Reason for Admission  Palpatations     Admission Date  8/18/2021    CODE STATUS  Full Code    HPI & HOSPITAL COURSE    Ms. Nickerson is a 31-year-old female with a PMHx of Bechets disease,?  Pulmonary embolism at age 16, currently pregnant at 11 weeks gestation, who presented on 8/18/2021 due to palpitations and presyncope.  Patient had reported that she felt palpitations while sitting up, felt orthostatic dizziness with presyncope for the past 2 days.  Patient also reports sharp right-sided chest pain, exacerbated by deep inspiration, which resolved.  Patient denies any chest discomfort, no dyspnea, no orthopnea, no leg swelling.  Patient denies any recent surgery or immobility, no known malignancy or hemoptysis.  Patient reports that on the morning of admission she had some morning sickness, nausea with vomiting, although not severe.  No recent fluid loss or diarrhea noted.    In ER, patient was noted to be occasionally tachycardic with HR of 130s, vitals were WNL.  Lab work noted WBC 11, initial EKG noted to be normal sinus rhythm with heart rate at 97.  Patient was given 3 L of fluids, however remained orthostatic with occasional borderline hypotension with SBP in the 90s.  Patient was admitted into the observation unit for presyncope.    During this admission, due to patient's history of pulmonary embolism when she was a teenager, patient was recommended to get a CTA of the pulmonary to rule out pulmonary embolism, but patient had refused due to being pregnant 11 weeks.  Patient also was given one-time dose of subcutaneous heparin, but has refused the rest of the doses.  Extensive discussion with patient in regards to the risk and benefits.  A D-dimer was obtained which noted  0.42 in value which is WNL.  Ultrasound of the bilateral lower extremity to rule out DVT was also obtained which was negative for any acute pathologies.  Discussed with patient results of the tests, but educated patient that we cannot 100% exclude pulmonary embolism due to the fact that she refused CTA pulmonary.  Patient well aware of the risks and benefits and verbalized understanding.  Patient highly recommended to follow-up with PCP and go to her OB/GYN appointment which is scheduled on Friday, 8/20/2021 with Dr. Working through [facility].  All questions and concerns answered prior to being discharged.  Patient discharged home.      Therefore, she is discharged in good and stable condition to home with close outpatient follow-up.    The patient recovered much more quickly than anticipated on admission.    Discharge Date  08/19/21      FOLLOW UP ITEMS POST DISCHARGE  Please call [phone] to schedule PCP appointment for patient.    Required specialty appointments include:       Discharge Instructions per RED RuizRTristanN.  => Follow-up with PCP s/p hospitalization  => Go to your scheduled appointment with OB/GYN on 8/20/121, Friday  => Keep yourself hydrated    DIET: As tolerated    ACTIVITY: As tolerated    DIAGNOSIS: Presyncope    Return to ER if symptoms persist, chest pain, palpitations, shortness of breath, numbness, tingling, weakness, and high fevers.    DISCHARGE DIAGNOSES  Principal Problem (Resolved):    Postural dizziness with presyncope POA: Unknown  Active Problems:    11 weeks gestation of pregnancy POA: Unknown    History of pulmonary embolism POA: Unknown  Resolved Problems:    Tachycardia POA: Unknown      FOLLOW UP  No future appointments.  PREET LinaresP.JESUS.  [address]  [address]  [facility] [id number] [phone]    Schedule an appointment as soon as possible for a visit in 1 week      Syeda Hyde M.D.  [address]  [address]  [facility]  "[phone]  [phone]    Go on 8/20/2021        MEDICATIONS ON DISCHARGE     Medication List      CONTINUE taking these medications      Instructions   PRENATAL ADULT GUMMY/DHA/FA PO   Take 2 Tablets by mouth every morning.  Dose: 2 Tablet     VITAMIN C GUMMIES PO   Take 2 Tablets by mouth every morning.  Dose: 2 Tablet            Allergies  Allergies   Allergen Reactions   • Iodine Hives and Rash         • Morphine Rash     \"Skin burny rash.\"       DIET  Orders Placed This Encounter   Procedures   • Diet Order Diet: Regular     Standing Status:   Standing     Number of Occurrences:   1     Order Specific Question:   Diet:     Answer:   Regular [1]       ACTIVITY  As tolerated.  Weight bearing as tolerated    CONSULTATIONS  NONE    PROCEDURES  NONE    IMAGING    US-EXTREMITY VENOUS LOWER BILAT   Final Result      DX-CHEST-LIMITED (1 VIEW)   Final Result      No acute cardiopulmonary disease.            LABORATORY  Lab Results   Component Value Date    SODIUM 135 08/18/2021    POTASSIUM 3.7 08/18/2021    CHLORIDE 104 08/18/2021    CO2 18 (L) 08/18/2021    GLUCOSE 80 08/18/2021    BUN 4 (L) 08/18/2021    CREATININE 0.48 (L) 08/18/2021    CREATININE 0.7 05/06/2009        Lab Results   Component Value Date    WBC 11.3 (H) 08/18/2021    WBC 10.4 (H) 01/22/2010    HEMOGLOBIN 13.9 08/18/2021    HEMATOCRIT 39.9 08/18/2021    PLATELETCT 324 08/18/2021        Total time of the discharge process exceeds 36 minutes.    ==========================================================================================================  Please note that this dictation was created using voice recognition software. I have made every reasonable attempt to correct obvious errors, but there may be errors of grammar and possibly content that I did not discover before finalizing the note.    Electronically signed by:  DARREN Alcaraz, MSN, APRN, FNP-C  Hospitalist Services  [facility]  [phone] " [phone]  [email or web address]  08/19/21    1317

## 2021-08-19 NOTE — HOSPITAL COURSE
Ms. Nickerson is a 31-year-old female with a PMHx of Bechets disease,?  Pulmonary embolism at age 16, currently pregnant at 11 weeks gestation, who presented on 8/18/2021 due to palpitations and presyncope.  Patient had reported that she felt palpitations while sitting up, felt orthostatic dizziness with presyncope for the past 2 days.  Patient also reports sharp right-sided chest pain, exacerbated by deep inspiration, which resolved.  Patient denies any chest discomfort, no dyspnea, no orthopnea, no leg swelling.  Patient denies any recent surgery or immobility, no known malignancy or hemoptysis.  Patient reports that on the morning of admission she had some morning sickness, nausea with vomiting, although not severe.  No recent fluid loss or diarrhea noted.    In ER, patient was noted to be occasionally tachycardic with HR of 130s, vitals were WNL.  Lab work noted WBC 11, initial EKG noted to be normal sinus rhythm with heart rate at 97.  Patient was given 3 L of fluids, however remained orthostatic with occasional borderline hypotension with SBP in the 90s.  Patient was admitted into the observation unit for presyncope.    During this admission, due to patient's history of pulmonary embolism when she was a teenager, patient was recommended to get a CTA of the pulmonary to rule out pulmonary embolism, but patient had refused due to being pregnant 11 weeks.  Patient also was given one-time dose of subcutaneous heparin, but has refused the rest of the doses.  Extensive discussion with patient in regards to the risk and benefits.  A D-dimer was obtained which noted 0.42 in value which is WNL.  Ultrasound of the bilateral lower extremity to rule out DVT was also obtained which was negative for any acute pathologies.  Discussed with patient results of the tests, but educated patient that we cannot 100% exclude pulmonary embolism due to the fact that she refused CTA pulmonary.  Patient well aware of the risks and benefits  and verbalized understanding.  Patient highly recommended to follow-up with PCP and go to her OB/GYN appointment which is scheduled on Friday, 8/20/2021 with  Working through [facility].  All questions and concerns answered prior to being discharged.  Patient discharged home.

## 2021-08-19 NOTE — CARE PLAN
Problem: Knowledge Deficit - Standard  Goal: Patient and family/care givers will demonstrate understanding of plan of care, disease process/condition, diagnostic tests and medications  Outcome: Progressing   The patient is Stable - Low risk of patient condition declining or worsening    Progress made toward(s) clinical / shift goals:  N/A    Patient is not progressing towards the following goals:

## 2021-08-19 NOTE — CARE PLAN
Problem: Knowledge Deficit - Standard  Goal: Patient and family/care givers will demonstrate understanding of plan of care, disease process/condition, diagnostic tests and medications  Outcome: Progressing   The patient is Stable - Low risk of patient condition declining or worsening    Shift Goals  Clinical Goals: Rule Out PE/DVT  Patient Goals: Discharge    Progress made toward(s) clinical / shift goals:  Pt is more steady on her feet, no more dizziness, and no more palpitations.    Patient is not progressing towards the following goals: N/A

## 2021-08-19 NOTE — PROGRESS NOTES
4 Eyes Skin Assessment Completed by Khushi RN and Kate RN.    Head WDL  Ears WDL  Nose WDL  Mouth WDL  Neck WDL  Breast/Chest WDL  Shoulder Blades WDL  Spine WDL  (R) Arm/Elbow/Hand WDL  (L) Arm/Elbow/Hand WDL  Abdomen WDL  Groin WDL  Scrotum/Coccyx/Buttocks WDL  (R) Leg WDL  (L) Leg WDL  (R) Heel/Foot/Toe WDL  (L) Heel/Foot/Toe WDL          Devices In Places Tele Box and Pulse Ox      Interventions In Place Pillows    Possible Skin Injury No    Pictures Uploaded Into Epic N/A  Wound Consult Placed N/A  RN Wound Prevention Protocol Ordered No

## 2021-08-19 NOTE — DISCHARGE INSTRUCTIONS
FOLLOW UP ITEMS POST DISCHARGE  Discharge Instructions per Alondra Alcaraz A.P.R.N.  => Follow-up with PCP s/p hospitalization  => Go to your scheduled appointment with OB/GYN on 8/20/121, Friday  => Keep yourself hydrated    DIET: As tolerated    ACTIVITY: As tolerated    DIAGNOSIS: Presyncope    Return to ER if symptoms persist, chest pain, palpitations, shortness of breath, numbness, tingling, weakness, and high fevers.    Discharge Instructions    Discharged to home by car with relative. Discharged via wheelchair, hospital escort: Yes.  Special equipment needed: Not Applicable    Be sure to schedule a follow-up appointment with your primary care doctor or any specialists as instructed.     Discharge Plan:   Diet Plan: Discussed  Activity Level: Discussed  Confirmed Follow up Appointment: Patient to Call and Schedule Appointment  Confirmed Symptoms Management: Discussed  Medication Reconciliation Updated: Yes    I understand that a diet low in cholesterol, fat, and sodium is recommended for good health. Unless I have been given specific instructions below for another diet, I accept this instruction as my diet prescription.   Other diet: Heart Healthy    Special Instructions: None    · Is patient discharged on Warfarin / Coumadin?   No     Depression / Suicide Risk    As you are discharged from this [facility] facility, it is important to learn how to keep safe from harming yourself.    Recognize the warning signs:  · Abrupt changes in personality, positive or negative- including increase in energy   · Giving away possessions  · Change in eating patterns- significant weight changes-  positive or negative  · Change in sleeping patterns- unable to sleep or sleeping all the time   · Unwillingness or inability to communicate  · Depression  · Unusual sadness, discouragement and loneliness  · Talk of wanting to die  · Neglect of personal appearance   · Rebelliousness- reckless behavior  · Withdrawal from  people/activities they love  · Confusion- inability to concentrate     If you or a loved one observes any of these behaviors or has concerns about self-harm, here's what you can do:  · Talk about it- your feelings and reasons for harming yourself  · Remove any means that you might use to hurt yourself (examples: pills, rope, extension cords, firearm)  · Get professional help from the community (Mental Health, Substance Abuse, psychological counseling)  · Do not be alone:Call your Safe Contact- someone whom you trust who will be there for you.  · Call your local CRISIS HOTLINE 102-2155 or 665-667-3310  · Call your local Children's Mobile Crisis Response Team [place] (764) 422-2615 or www.[employer] 49  · Call the toll free National Suicide Prevention Hotlines   · National Suicide Prevention Lifeline 702-747-BOBJ (3968)  · National Hope Line Network 800-SUICIDE (728-7228)

## 2021-08-19 NOTE — PROGRESS NOTES
Report received from MOY Love.  Patient resting in bed feeling much better.  No more abdominal cramping.  Patient A & O  X  4.  No apparent signs of distress.  Safety precautions in place.  Patient educated to call for assistance.  Hourly rounding in place.

## 2021-08-19 NOTE — CARE PLAN
The patient is Stable - Low risk of patient condition declining or worsening         Progress made toward(s) clinical / shift goals:  pt verbalizes understanding of plan of care    Patient is not progressing towards the following goals:

## 2021-08-20 ENCOUNTER — APPOINTMENT (OUTPATIENT)
Dept: RADIOLOGY | Facility: MEDICAL CENTER | Age: 31
End: 2021-08-20
Attending: EMERGENCY MEDICINE

## 2021-08-20 ENCOUNTER — HOSPITAL ENCOUNTER (EMERGENCY)
Facility: MEDICAL CENTER | Age: 31
End: 2021-08-21
Attending: EMERGENCY MEDICINE

## 2021-08-20 DIAGNOSIS — O20.9 VAGINAL BLEEDING IN PREGNANCY, FIRST TRIMESTER: ICD-10-CM

## 2021-08-20 LAB
ANION GAP SERPL CALC-SCNC: 13 MMOL/L (ref 7–16)
B-HCG SERPL-ACNC: ABNORMAL MIU/ML (ref 0–5)
BASOPHILS # BLD AUTO: 0.6 % (ref 0–1.8)
BASOPHILS # BLD: 0.06 K/UL (ref 0–0.12)
BUN SERPL-MCNC: 6 MG/DL (ref 8–22)
CALCIUM SERPL-MCNC: 8.5 MG/DL (ref 8.5–10.5)
CHLORIDE SERPL-SCNC: 104 MMOL/L (ref 96–112)
CO2 SERPL-SCNC: 20 MMOL/L (ref 20–33)
CREAT SERPL-MCNC: 0.37 MG/DL (ref 0.5–1.4)
EOSINOPHIL # BLD AUTO: 0.37 K/UL (ref 0–0.51)
EOSINOPHIL NFR BLD: 3.5 % (ref 0–6.9)
ERYTHROCYTE [DISTWIDTH] IN BLOOD BY AUTOMATED COUNT: 39.3 FL (ref 35.9–50)
GLUCOSE SERPL-MCNC: 102 MG/DL (ref 65–99)
HCT VFR BLD AUTO: 33.5 % (ref 37–47)
HGB BLD-MCNC: 11.8 G/DL (ref 12–16)
IMM GRANULOCYTES # BLD AUTO: 0.13 K/UL (ref 0–0.11)
IMM GRANULOCYTES NFR BLD AUTO: 1.2 % (ref 0–0.9)
LYMPHOCYTES # BLD AUTO: 1.84 K/UL (ref 1–4.8)
LYMPHOCYTES NFR BLD: 17.3 % (ref 22–41)
MCH RBC QN AUTO: 30.3 PG (ref 27–33)
MCHC RBC AUTO-ENTMCNC: 35.2 G/DL (ref 33.6–35)
MCV RBC AUTO: 85.9 FL (ref 81.4–97.8)
MONOCYTES # BLD AUTO: 0.9 K/UL (ref 0–0.85)
MONOCYTES NFR BLD AUTO: 8.5 % (ref 0–13.4)
NEUTROPHILS # BLD AUTO: 7.35 K/UL (ref 2–7.15)
NEUTROPHILS NFR BLD: 68.9 % (ref 44–72)
NRBC # BLD AUTO: 0 K/UL
NRBC BLD-RTO: 0 /100 WBC
PLATELET # BLD AUTO: 303 K/UL (ref 164–446)
PMV BLD AUTO: 9.5 FL (ref 9–12.9)
POTASSIUM SERPL-SCNC: 3.1 MMOL/L (ref 3.6–5.5)
RBC # BLD AUTO: 3.9 M/UL (ref 4.2–5.4)
SODIUM SERPL-SCNC: 137 MMOL/L (ref 135–145)
WBC # BLD AUTO: 10.7 K/UL (ref 4.8–10.8)

## 2021-08-20 PROCEDURE — 99284 EMERGENCY DEPT VISIT MOD MDM: CPT

## 2021-08-20 PROCEDURE — 84702 CHORIONIC GONADOTROPIN TEST: CPT

## 2021-08-20 PROCEDURE — 85025 COMPLETE CBC W/AUTO DIFF WBC: CPT

## 2021-08-20 PROCEDURE — 80048 BASIC METABOLIC PNL TOTAL CA: CPT

## 2021-08-20 ASSESSMENT — FIBROSIS 4 INDEX: FIB4 SCORE: 0.35

## 2021-08-21 VITALS
OXYGEN SATURATION: 95 % | DIASTOLIC BLOOD PRESSURE: 60 MMHG | HEIGHT: 61 IN | RESPIRATION RATE: 18 BRPM | HEART RATE: 81 BPM | TEMPERATURE: 98.4 F | WEIGHT: 169.53 LBS | BODY MASS INDEX: 32.01 KG/M2 | SYSTOLIC BLOOD PRESSURE: 104 MMHG

## 2021-08-21 PROCEDURE — 76801 OB US < 14 WKS SINGLE FETUS: CPT

## 2021-08-21 NOTE — DISCHARGE INSTRUCTIONS
Your tests here were very reassuring.  You have a healthy appearing pregnancy.  Your blood work is also reassuring.  Your potassium is slightly low, so you might want to add some high potassium foods to your diet, such as bananas or potatoes.    Please call your OB/GYN tomorrow to schedule a follow-up visit.  Return here if you are having heavy bleeding or bleeding with significant abdominal cramps, or any severe symptoms that cannot be managed by your OB/GYN.

## 2021-08-21 NOTE — ED NOTES
Pt ambulated to blue 22. Placed in gown and on monitors. Currently no complaints of SOB or pain.  Pt appears anxious, no needs at this time.

## 2021-08-21 NOTE — ED PROVIDER NOTES
"ED Provider Note    Scribed for Isai Sierra M.D. by Isai Sierra M.D.. 8/20/2021,  9:44 PM.    CHIEF COMPLAINT  Chief Complaint   Patient presents with   • Vaginal Bleeding     x 10 minutes. Pt is 13 weeks preg. Discharged yesterday with PE was given IM Heparin.        [facility]  Flaca Nickerson is a 31 y.o. female who presents to the Emergency Department for a small amount of fresh blood in the toilet this evening, in the setting of a known subchorionic hemorrhage at approximately 13 weeks pregnant.  She is not on blood thinners, but received a single dose of intramuscular heparin prior to discharge from this hospital within the past couple of days due to concern for PE.  She has not had any further shortness of breath or tachycardia, despite walking around most of the day today.  She has not had fevers or chills, nausea or vomiting, and the general says that she has not felt this good since before her morning sickness started at 6 weeks.  She has normal vital signs on arrival here.  She has not had persistent bleeding.  She says the blood in the toilet had a tissue-like character that looked like \"shedding\" from a menstrual cycle.  She reports very minimal cramping to the left lower quadrant.  She had a normal first trimester ultrasound at 7 weeks, per her report.  She is due to see her OB/GYN on Thursday of next week.      REVIEW OF SYSTEMS  See [facility] for further details. All other systems are negative.     PAST MEDICAL HISTORY   has a past medical history of Abdominal mass (9/10/2013), Adenomatous polyps, ASTHMA, Behcet's disease (HCC), Behcet's disease (HCC), Colon polyps (9/10/2013), HYPOTENSION, Migraine, Mouth ulcer, Palpitation, Palpitations, Pulmonary embolism (HCC) (01/01/2009), SVT (supraventricular tachycardia) ([facility]), and Typhoid fever.    SOCIAL HISTORY  Social History     Tobacco Use   • Smoking status: Never Smoker   • Smokeless tobacco: Never Used   Vaping Use   • Vaping Use: Never " "used   Substance and Sexual Activity   • Alcohol use: Not Currently     Alcohol/week: 0.0 oz     Comment: rarely   • Drug use: No   • Sexual activity: Yes     Partners: Male     Birth control/protection: Condom, Pill     Comment: single     Social History     Substance and Sexual Activity   Drug Use No       SURGICAL HISTORY   has a past surgical history that includes tonsillectomy (2004) and appendectomy.    CURRENT MEDICATIONS  Home Medications    **Home medications have not yet been reviewed for this encounter**         ALLERGIES  Allergies   Allergen Reactions   • Iodine Hives and Rash         • Morphine Rash     \"Skin burny rash.\"       PHYSICAL EXAM  VITAL SIGNS: /60   Pulse 81   Temp 36.9 °C (98.4 °F) (Temporal)   Resp 18   Ht 1.549 m (5' 1\")   Wt 76.9 kg (169 lb 8.5 oz)   LMP 12/15/2020 (Exact Date)   SpO2 95%   BMI 32.03 kg/m²   Pulse ox interpretation: I interpret this pulse ox as normal.  Constitutional: Alert in no apparent distress.  HENT: No signs of trauma, Bilateral external ears normal, Nose normal.   Eyes: Pupils are equal and reactive, Conjunctiva normal, Non-icteric.   Neck: Normal range of motion, Supple, No stridor.    Cardiovascular: Normal peripheral perfusion  Thorax & Lungs: Unlabored respirations, equal chest expansion, no accessory muscle use  Abdomen: Non-distended  Skin:  No erythema, No rash.   Back: Normal alignment and ROM  Extremities: No gross deformity  Musculoskeletal: Good range of motion in all major joints.   Neurologic: Alert, Normal motor function, No focal deficits noted.   Psychiatric: Affect normal, Judgment normal, Mood normal.    DIAGNOSTIC STUDIES / PROCEDURES    LABS  Labs Reviewed   CBC WITH DIFFERENTIAL - Abnormal; Notable for the following components:       Result Value    RBC 3.90 (*)     Hemoglobin 11.8 (*)     Hematocrit 33.5 (*)     MCHC 35.2 (*)     Lymphocytes 17.30 (*)     Immature Granulocytes 1.20 (*)     Neutrophils (Absolute) 7.35 (*)     " Monos (Absolute) 0.90 (*)     Immature Granulocytes (abs) 0.13 (*)     All other components within normal limits   BASIC METABOLIC PANEL - Abnormal; Notable for the following components:    Potassium 3.1 (*)     Glucose 102 (*)     Bun 6 (*)     Creatinine 0.37 (*)     All other components within normal limits   HCG QUANTITATIVE - Abnormal; Notable for the following components:    Bhcg 35183.0 (*)     All other components within normal limits   ESTIMATED GFR     All labs reviewed by me.    RADIOLOGY  US-OB 1ST TRIMESTER SINGLE GEST Is the patient pregnant? Yes   Final Result      Viable single intrauterine gestation of an estimated sonographic gestational age 13 weeks 3 days and estimated due date 2/22/2022.        The radiologist's interpretation of all radiological studies have been reviewed by me.    COURSE & MEDICAL DECISION MAKING  Nursing notes, VS, PMSFHx reviewed in chart.     9:44 PM Patient seen and examined at bedside. Differential diagnosis includes but is not limited to bleeding from known subchorionic hemorrhage versus worsening subchorionic bleed, threatened miscarriage, normal first trimester bleeding.  She and I discussed that its fairly unlikely that this bleeding is caused by a singular intramuscular dose of heparin, but she will be further evaluated. Ordered for CBC, BMP, quantitative hCG, pelvic ultrasound to evaluate.     12:29 AM Ultrasonographer reports a healthy-appearing IUP. I've reviewed the images as well, and feel the patient can be discharged to OBGYN follow-up with close return precautions.      The patient will return for new or worsening symptoms and is stable at the time of discharge.    The patient is referred to a primary physician for blood pressure management, diabetic screening, and for all other preventative health concerns.      DISPOSITION:  Patient will be discharged home in stable condition.    FOLLOW UP:  Syeda Hyde M.D.  [address]  [facility] [address]  [facility]  [id number] [phone]    Call in 1 day      [facility], Emergency Dept  [address] [facility] [address] [phone]    If symptoms worsen      OUTPATIENT MEDICATIONS:  Discharge Medication List as of 8/21/2021 12:32 AM            FINAL IMPRESSION  1. Vaginal bleeding in pregnancy, first trimester

## 2021-08-21 NOTE — ED NOTES
Discharge instructions provided, pt verbalizes understanding and has no questions. Vital signs stable, PIV removed, pt ambulated out of ER with steady gate.

## 2021-08-21 NOTE — ED TRIAGE NOTES
Chief Complaint   Patient presents with   • Vaginal Bleeding     x 10 minutes. Pt is 13 weeks preg. Discharged yesterday with PE was given IM Heparin.      Pt ambulatory to triage with above complaints. Instructed to return to ED if any bleeding. Educated on triage process, encouraged to inform staff of any changes. Notified Charge RN of pt.

## 2021-12-07 ENCOUNTER — HOSPITAL ENCOUNTER (EMERGENCY)
Facility: MEDICAL CENTER | Age: 31
End: 2021-12-07
Attending: SPECIALIST | Admitting: SPECIALIST
Payer: COMMERCIAL

## 2021-12-07 VITALS
RESPIRATION RATE: 16 BRPM | HEIGHT: 61 IN | OXYGEN SATURATION: 95 % | BODY MASS INDEX: 31.72 KG/M2 | DIASTOLIC BLOOD PRESSURE: 70 MMHG | WEIGHT: 168 LBS | TEMPERATURE: 97.8 F | SYSTOLIC BLOOD PRESSURE: 110 MMHG | HEART RATE: 84 BPM

## 2021-12-07 LAB — A1 MICROGLOB PLACENTAL VAG QL: NEGATIVE

## 2021-12-07 PROCEDURE — 84112 EVAL AMNIOTIC FLUID PROTEIN: CPT

## 2021-12-07 PROCEDURE — 59025 FETAL NON-STRESS TEST: CPT

## 2021-12-07 PROCEDURE — 302449 STATCHG TRIAGE ONLY (STATISTIC)

## 2021-12-07 ASSESSMENT — FIBROSIS 4 INDEX: FIB4 SCORE: 0.37

## 2021-12-08 NOTE — PROGRESS NOTES
"Pt is a , ERVIN, 22, 28.5 wks today presenting to triage with c/o decreased FM \"since last night\". Pt reports usually feeling strong FM that has not been felt today. Additionally has c/o hip pain since Friday, \"feels like I pulled my groin\". Denies feeling UC's, VB or cramping. Does state \"watery, clear discharge since yesterday\".   VSS. EFM and TOCO applied. Pt reports feeling fetal movement once monitors are placed that is audible to this RN.   - Report given to Dr Justin. Orders received to collect an amnisure. Per MD if tracing reassuring and negative amnisure received, this pt may be discharged and follow up at the office tomorrow.   - Pt up to restroo.   - Negative amnisure result obtained. Pt given  labor precautions. Educated to return if experiencing LOF, more than 4 UC's in 1 hour, cramping, VB or experiences decreased FM again. Pt reports fetal movement and is reassured.   "

## 2022-01-24 ENCOUNTER — HOSPITAL ENCOUNTER (EMERGENCY)
Facility: MEDICAL CENTER | Age: 32
End: 2022-01-24
Attending: SPECIALIST | Admitting: SPECIALIST
Payer: COMMERCIAL

## 2022-01-24 VITALS
HEIGHT: 61 IN | RESPIRATION RATE: 16 BRPM | WEIGHT: 184 LBS | BODY MASS INDEX: 34.74 KG/M2 | DIASTOLIC BLOOD PRESSURE: 69 MMHG | SYSTOLIC BLOOD PRESSURE: 104 MMHG | TEMPERATURE: 97 F | HEART RATE: 91 BPM | OXYGEN SATURATION: 94 %

## 2022-01-24 PROCEDURE — 59025 FETAL NON-STRESS TEST: CPT

## 2022-01-24 PROCEDURE — 302449 STATCHG TRIAGE ONLY (STATISTIC)

## 2022-01-24 ASSESSMENT — FIBROSIS 4 INDEX: FIB4 SCORE: 0.37

## 2022-01-25 NOTE — PROGRESS NOTES
EDC 2022 EGA 35.4    Pt presented to L&D for c/o UC's and vaginal bleeding. Pt denies LOF, states + fetal movement. EFM and TOCO applied. VE  3. Reactive NST obtained. Dr. Justin notified of pt arrival, UC,s NST, and VE. Orders to discharge home received. Labor precautions and fetal kick count instructions given to pt. Pt verbalizes an understanding. Pt discharged home.

## 2022-02-02 ENCOUNTER — HOSPITAL ENCOUNTER (EMERGENCY)
Facility: MEDICAL CENTER | Age: 32
End: 2022-02-02
Attending: SPECIALIST | Admitting: SPECIALIST
Payer: COMMERCIAL

## 2022-02-02 VITALS
OXYGEN SATURATION: 94 % | RESPIRATION RATE: 18 BRPM | WEIGHT: 183 LBS | SYSTOLIC BLOOD PRESSURE: 116 MMHG | DIASTOLIC BLOOD PRESSURE: 67 MMHG | HEART RATE: 90 BPM | HEIGHT: 61 IN | BODY MASS INDEX: 34.55 KG/M2 | TEMPERATURE: 97.9 F

## 2022-02-02 PROCEDURE — 59025 FETAL NON-STRESS TEST: CPT

## 2022-02-02 PROCEDURE — 302449 STATCHG TRIAGE ONLY (STATISTIC)

## 2022-02-02 ASSESSMENT — FIBROSIS 4 INDEX: FIB4 SCORE: 0.37

## 2022-02-03 NOTE — H&P
DATE OF ADMISSION:  2022     REASON FOR ADMISSION:  Augmentation of labor with Pitocin per protocol.     HISTORY OF PRESENT ILLNESS:  This is a 31-year-old  4, para 1,   spontaneous  2 at 38 and 6/7 weeks' gestation based on a 12-week   ultrasound, presenting for prenatal care at a late gestational age, followed   by our office and [facility] with intrauterine growth   restriction with weekly  testing done by the [facility] with a recommendation to proceed forward with delivery between 38 and   39 weeks' gestation, now electing to proceed forward with admission and   delivery at this time.     PAST MEDICAL HISTORY:  Asthma.     PAST SURGICAL HISTORY:  Appendectomy in 2016, tonsillectomy and adenoidectomy   in 2001.     OBSTETRICAL HISTORY:  In  at 39 and 1/2 weeks' gestation, the patient had   a spontaneous vaginal delivery in [place], 6 pound 9 ounce infant,   complicated by postpartum hemorrhage.  She has had 2 spontaneous abortions.    This is her fourth pregnancy.     GYNECOLOGIC HISTORY:  The patient does give a history of genital herpes, has   had no outbreaks.     SOCIAL HISTORY:  She denies use of any alcohol, tobacco or recreational drug   use.     MEDICATIONS:  Prenatal vitamins.     ALLERGIES:  IODINE.     PHYSICAL EXAMINATION:  VITAL SIGNS:  She is afebrile, hemodynamically stable.  Current vital signs   can be seen in electronic medical record.  HEART:  Regular rate and rhythm.  CHEST:  Clear to auscultation bilaterally.  ABDOMEN:  Soft, gravid, nontender.  PELVIC:  Sterile vaginal exam 1, 50%, -2 station.  EXTREMITIES:  Nontender.     LABORATORY DATA:  Prenatal care labs are all in order.  Group B strep status   is currently pending.     DIAGNOSTIC DATA:  Most recent ultrasound shows the fetus in vertex   presentation.  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  Risks, benefits and alternatives have been   addressed.  She has asked appropriate questions and wished to proceed forward   with admission and augmentation of labor with Pitocin.        ______________________________  MD APPLE Patel/FLORINA    DD:  2022 19:06  DT:  2022 19:32    Job#:  [id number]

## 2022-02-03 NOTE — PROGRESS NOTES
1900: report received from Desi christensen, assumed care of patient. rn to bs, pt reports sharp abd pain in her upper abd.  Pt states it becomes worse when she stretches her abd or breathes in deep.  rn palpate abd, pt states it is tender in her fundal area.  While palpating, fetal appendage felt and fetus kicked.  Pt states it is tender.  Pt denies vaginal bleeding or leaking.  Pt reports some decrease in fetal movement this afternoon, but now states she is feeling a great deal of movement. Auditory fetal movement heard on the monitor as well.  Pt denies uc's or cramping. Vss.   Pt was seen by dr. justin in the office today, pt states she is eating and drinking water.      1920: call to dr. Justin, report given.  Orders to discharge home. Pt can follow up in the office tomorrow, if necessary or return if the pain becomes worse. Pt given instructions for rest and hydration.  Pt given labor precautions and discharged in stable condition.

## 2022-02-11 ENCOUNTER — HOSPITAL ENCOUNTER (OUTPATIENT)
Dept: OBGYN | Facility: MEDICAL CENTER | Age: 32
End: 2022-02-11
Attending: SPECIALIST
Payer: COMMERCIAL

## 2022-02-11 LAB
SARS-COV-2 RNA RESP QL NAA+PROBE: NOTDETECTED
SPECIMEN SOURCE: NORMAL

## 2022-02-11 PROCEDURE — U0005 INFEC AGEN DETEC AMPLI PROBE: HCPCS

## 2022-02-11 PROCEDURE — U0003 INFECTIOUS AGENT DETECTION BY NUCLEIC ACID (DNA OR RNA); SEVERE ACUTE RESPIRATORY SYNDROME CORONAVIRUS 2 (SARS-COV-2) (CORONAVIRUS DISEASE [COVID-19]), AMPLIFIED PROBE TECHNIQUE, MAKING USE OF HIGH THROUGHPUT TECHNOLOGIES AS DESCRIBED BY CMS-2020-01-R: HCPCS

## 2022-02-11 RX ORDER — NITROFURANTOIN 25; 75 MG/1; MG/1
100 CAPSULE ORAL 2 TIMES DAILY
COMMUNITY

## 2022-02-11 RX ORDER — VALACYCLOVIR HYDROCHLORIDE 500 MG/1
500 TABLET, FILM COATED ORAL 2 TIMES DAILY
COMMUNITY

## 2022-02-11 NOTE — H&P
DATE OF ADMISSION:  2022     ADDENDUM     REASON FOR ADMISSION:  Genital herpes outbreak at term.     HISTORY OF PRESENT ILLNESS:  This is a 31-year-old  4, para 1,   spontaneous  2, at 39 weeks' gestation based on a 12-week ultrasound,   presenting for a primary low transverse  section.  Per recommendation   [facility] had suggested the patient be delivered between 38   and 39 weeks' gestation. The patient now is at this gestational age; however,   she now has a vesicular eruption on the right labia with a known history of   genital herpes.  The patient was counseled regarding proceeding forward with a   possible [place] smear versus a culture; however, the patient is not   comfortable proceeding forward with this given the possibility of a false   negative study and given her history, now is requesting electing to proceed   forward with a primary low transverse  section.  Risks, benefits and   alternatives of this approach were addressed.  She has asked appropriate   questions, signed the appropriate consents and is wishing to proceed forward   with admission and delivery via primary low transverse  section at   this time.  Please see the previously dictated history and physical for any   other details.        ______________________________  MD APPLE Patel/SOTERO/DIGNA    DD:  2022 12:49  DT:  2022 13:49    Job#:  [id number]

## 2022-02-11 NOTE — PROGRESS NOTES
0845-Patient here for pre admit COVID Screen. Self Isolation instructions reviewed and given to pt. COVID collection explained to pt. Pt states understanding and denies any questions. COVID test collected and sent to lab. Pt sent home to self isolate

## 2022-02-14 ENCOUNTER — ANESTHESIA (OUTPATIENT)
Dept: OBGYN | Facility: MEDICAL CENTER | Age: 32
End: 2022-02-14
Payer: COMMERCIAL

## 2022-02-14 ENCOUNTER — HOSPITAL ENCOUNTER (INPATIENT)
Facility: MEDICAL CENTER | Age: 32
LOS: 2 days | End: 2022-02-16
Attending: SPECIALIST | Admitting: SPECIALIST
Payer: COMMERCIAL

## 2022-02-14 ENCOUNTER — APPOINTMENT (OUTPATIENT)
Dept: OBGYN | Facility: MEDICAL CENTER | Age: 32
End: 2022-02-14
Attending: SPECIALIST
Payer: COMMERCIAL

## 2022-02-14 ENCOUNTER — ANESTHESIA EVENT (OUTPATIENT)
Dept: OBGYN | Facility: MEDICAL CENTER | Age: 32
End: 2022-02-14
Payer: COMMERCIAL

## 2022-02-14 LAB
ERYTHROCYTE [DISTWIDTH] IN BLOOD BY AUTOMATED COUNT: 42 FL (ref 35.9–50)
HCT VFR BLD AUTO: 37.4 % (ref 37–47)
HGB BLD-MCNC: 13.2 G/DL (ref 12–16)
HOLDING TUBE BB 8507: NORMAL
MCH RBC QN AUTO: 31.1 PG (ref 27–33)
MCHC RBC AUTO-ENTMCNC: 35.3 G/DL (ref 33.6–35)
MCV RBC AUTO: 88 FL (ref 81.4–97.8)
PLATELET # BLD AUTO: 289 K/UL (ref 164–446)
PMV BLD AUTO: 10.5 FL (ref 9–12.9)
RBC # BLD AUTO: 4.25 M/UL (ref 4.2–5.4)
WBC # BLD AUTO: 10.7 K/UL (ref 4.8–10.8)

## 2022-02-14 PROCEDURE — 160029 HCHG SURGERY MINUTES - 1ST 30 MINS LEVEL 4: Performed by: SPECIALIST

## 2022-02-14 PROCEDURE — 303615 HCHG EPIDURAL/SPINAL ANESTHESIA FOR LABOR

## 2022-02-14 PROCEDURE — 160035 HCHG PACU - 1ST 60 MINS PHASE I: Performed by: SPECIALIST

## 2022-02-14 PROCEDURE — 700105 HCHG RX REV CODE 258: Performed by: ANESTHESIOLOGY

## 2022-02-14 PROCEDURE — 770002 HCHG ROOM/CARE - OB PRIVATE (112)

## 2022-02-14 PROCEDURE — 700111 HCHG RX REV CODE 636 W/ 250 OVERRIDE (IP)

## 2022-02-14 PROCEDURE — 700105 HCHG RX REV CODE 258: Performed by: SPECIALIST

## 2022-02-14 PROCEDURE — 700101 HCHG RX REV CODE 250: Performed by: ANESTHESIOLOGY

## 2022-02-14 PROCEDURE — 700102 HCHG RX REV CODE 250 W/ 637 OVERRIDE(OP): Performed by: ANESTHESIOLOGY

## 2022-02-14 PROCEDURE — A9270 NON-COVERED ITEM OR SERVICE: HCPCS | Performed by: ANESTHESIOLOGY

## 2022-02-14 PROCEDURE — 160009 HCHG ANES TIME/MIN: Performed by: SPECIALIST

## 2022-02-14 PROCEDURE — 36415 COLL VENOUS BLD VENIPUNCTURE: CPT

## 2022-02-14 PROCEDURE — 85027 COMPLETE CBC AUTOMATED: CPT

## 2022-02-14 PROCEDURE — 700111 HCHG RX REV CODE 636 W/ 250 OVERRIDE (IP): Performed by: SPECIALIST

## 2022-02-14 PROCEDURE — 700111 HCHG RX REV CODE 636 W/ 250 OVERRIDE (IP): Performed by: ANESTHESIOLOGY

## 2022-02-14 PROCEDURE — 160041 HCHG SURGERY MINUTES - EA ADDL 1 MIN LEVEL 4: Performed by: SPECIALIST

## 2022-02-14 PROCEDURE — 160048 HCHG OR STATISTICAL LEVEL 1-5: Performed by: SPECIALIST

## 2022-02-14 PROCEDURE — 160002 HCHG RECOVERY MINUTES (STAT): Performed by: SPECIALIST

## 2022-02-14 RX ORDER — OXYCODONE HYDROCHLORIDE 5 MG/1
5 TABLET ORAL EVERY 4 HOURS PRN
Status: DISCONTINUED | OUTPATIENT
Start: 2022-02-15 | End: 2022-02-16 | Stop reason: HOSPADM

## 2022-02-14 RX ORDER — DIPHENHYDRAMINE HYDROCHLORIDE 50 MG/ML
12.5 INJECTION INTRAMUSCULAR; INTRAVENOUS EVERY 6 HOURS PRN
Status: DISCONTINUED | OUTPATIENT
Start: 2022-02-14 | End: 2022-02-15

## 2022-02-14 RX ORDER — METHYLERGONOVINE MALEATE 0.2 MG/ML
0.2 INJECTION INTRAVENOUS
Status: DISCONTINUED | OUTPATIENT
Start: 2022-02-14 | End: 2022-02-16 | Stop reason: HOSPADM

## 2022-02-14 RX ORDER — OXYCODONE HCL 5 MG/5 ML
10 SOLUTION, ORAL ORAL
Status: CANCELLED | OUTPATIENT
Start: 2022-02-14

## 2022-02-14 RX ORDER — DIPHENHYDRAMINE HYDROCHLORIDE 50 MG/ML
12.5 INJECTION INTRAMUSCULAR; INTRAVENOUS
Status: CANCELLED | OUTPATIENT
Start: 2022-02-14

## 2022-02-14 RX ORDER — SODIUM CHLORIDE, SODIUM LACTATE, POTASSIUM CHLORIDE, CALCIUM CHLORIDE 600; 310; 30; 20 MG/100ML; MG/100ML; MG/100ML; MG/100ML
INJECTION, SOLUTION INTRAVENOUS ONCE
Status: DISCONTINUED | OUTPATIENT
Start: 2022-02-14 | End: 2022-02-14

## 2022-02-14 RX ORDER — OXYTOCIN 10 [USP'U]/ML
10 INJECTION, SOLUTION INTRAMUSCULAR; INTRAVENOUS
Status: DISCONTINUED | OUTPATIENT
Start: 2022-02-14 | End: 2022-02-16 | Stop reason: HOSPADM

## 2022-02-14 RX ORDER — MISOPROSTOL 200 UG/1
800 TABLET ORAL
Status: DISCONTINUED | OUTPATIENT
Start: 2022-02-14 | End: 2022-02-16 | Stop reason: HOSPADM

## 2022-02-14 RX ORDER — MEPERIDINE HYDROCHLORIDE 25 MG/ML
12.5 INJECTION INTRAMUSCULAR; INTRAVENOUS; SUBCUTANEOUS
Status: CANCELLED | OUTPATIENT
Start: 2022-02-14

## 2022-02-14 RX ORDER — ACETAMINOPHEN 500 MG
1000 TABLET ORAL EVERY 6 HOURS
Status: DISCONTINUED | OUTPATIENT
Start: 2022-02-15 | End: 2022-02-15

## 2022-02-14 RX ORDER — ONDANSETRON 2 MG/ML
INJECTION INTRAMUSCULAR; INTRAVENOUS PRN
Status: DISCONTINUED | OUTPATIENT
Start: 2022-02-14 | End: 2022-02-14 | Stop reason: SURG

## 2022-02-14 RX ORDER — KETOROLAC TROMETHAMINE 30 MG/ML
INJECTION, SOLUTION INTRAMUSCULAR; INTRAVENOUS PRN
Status: DISCONTINUED | OUTPATIENT
Start: 2022-02-14 | End: 2022-02-14 | Stop reason: SURG

## 2022-02-14 RX ORDER — METOCLOPRAMIDE HYDROCHLORIDE 5 MG/ML
10 INJECTION INTRAMUSCULAR; INTRAVENOUS ONCE
Status: COMPLETED | OUTPATIENT
Start: 2022-02-14 | End: 2022-02-14

## 2022-02-14 RX ORDER — OXYCODONE HYDROCHLORIDE 10 MG/1
10 TABLET ORAL EVERY 4 HOURS PRN
Status: DISCONTINUED | OUTPATIENT
Start: 2022-02-14 | End: 2022-02-15

## 2022-02-14 RX ORDER — OXYCODONE HCL 5 MG/5 ML
5 SOLUTION, ORAL ORAL
Status: CANCELLED | OUTPATIENT
Start: 2022-02-14

## 2022-02-14 RX ORDER — DIPHENHYDRAMINE HCL 25 MG
25 TABLET ORAL EVERY 6 HOURS PRN
Status: DISCONTINUED | OUTPATIENT
Start: 2022-02-15 | End: 2022-02-16 | Stop reason: HOSPADM

## 2022-02-14 RX ORDER — MORPHINE SULFATE 0.5 MG/ML
INJECTION, SOLUTION EPIDURAL; INTRATHECAL; INTRAVENOUS PRN
Status: DISCONTINUED | OUTPATIENT
Start: 2022-02-14 | End: 2022-02-14 | Stop reason: SURG

## 2022-02-14 RX ORDER — MORPHINE SULFATE 0.5 MG/ML
INJECTION, SOLUTION EPIDURAL; INTRATHECAL; INTRAVENOUS
Status: COMPLETED
Start: 2022-02-14 | End: 2022-02-14

## 2022-02-14 RX ORDER — DIPHENHYDRAMINE HYDROCHLORIDE 50 MG/ML
25 INJECTION INTRAMUSCULAR; INTRAVENOUS EVERY 6 HOURS PRN
Status: DISCONTINUED | OUTPATIENT
Start: 2022-02-14 | End: 2022-02-15

## 2022-02-14 RX ORDER — METOCLOPRAMIDE HYDROCHLORIDE 5 MG/ML
10 INJECTION INTRAMUSCULAR; INTRAVENOUS EVERY 6 HOURS PRN
Status: DISCONTINUED | OUTPATIENT
Start: 2022-02-15 | End: 2022-02-16 | Stop reason: HOSPADM

## 2022-02-14 RX ORDER — ONDANSETRON 2 MG/ML
4 INJECTION INTRAMUSCULAR; INTRAVENOUS
Status: CANCELLED | OUTPATIENT
Start: 2022-02-14

## 2022-02-14 RX ORDER — ONDANSETRON 4 MG/1
4 TABLET, ORALLY DISINTEGRATING ORAL EVERY 6 HOURS PRN
Status: DISCONTINUED | OUTPATIENT
Start: 2022-02-15 | End: 2022-02-16 | Stop reason: HOSPADM

## 2022-02-14 RX ORDER — OXYTOCIN 10 [USP'U]/ML
10 INJECTION, SOLUTION INTRAMUSCULAR; INTRAVENOUS
Status: DISCONTINUED | OUTPATIENT
Start: 2022-02-14 | End: 2022-02-14

## 2022-02-14 RX ORDER — ONDANSETRON 2 MG/ML
4 INJECTION INTRAMUSCULAR; INTRAVENOUS EVERY 6 HOURS PRN
Status: DISCONTINUED | OUTPATIENT
Start: 2022-02-15 | End: 2022-02-16 | Stop reason: HOSPADM

## 2022-02-14 RX ORDER — KETOROLAC TROMETHAMINE 30 MG/ML
30 INJECTION, SOLUTION INTRAMUSCULAR; INTRAVENOUS EVERY 6 HOURS
Status: DISPENSED | OUTPATIENT
Start: 2022-02-15 | End: 2022-02-15

## 2022-02-14 RX ORDER — HYDROMORPHONE HYDROCHLORIDE 1 MG/ML
0.4 INJECTION, SOLUTION INTRAMUSCULAR; INTRAVENOUS; SUBCUTANEOUS
Status: DISCONTINUED | OUTPATIENT
Start: 2022-02-14 | End: 2022-02-15

## 2022-02-14 RX ORDER — CITRIC ACID/SODIUM CITRATE 334-500MG
30 SOLUTION, ORAL ORAL ONCE
Status: COMPLETED | OUTPATIENT
Start: 2022-02-14 | End: 2022-02-14

## 2022-02-14 RX ORDER — MIDAZOLAM HYDROCHLORIDE 1 MG/ML
1 INJECTION INTRAMUSCULAR; INTRAVENOUS
Status: CANCELLED | OUTPATIENT
Start: 2022-02-14

## 2022-02-14 RX ORDER — DIPHENHYDRAMINE HYDROCHLORIDE 50 MG/ML
25 INJECTION INTRAMUSCULAR; INTRAVENOUS EVERY 6 HOURS PRN
Status: DISCONTINUED | OUTPATIENT
Start: 2022-02-15 | End: 2022-02-16 | Stop reason: HOSPADM

## 2022-02-14 RX ORDER — OXYCODONE HYDROCHLORIDE 10 MG/1
10 TABLET ORAL EVERY 4 HOURS PRN
Status: DISCONTINUED | OUTPATIENT
Start: 2022-02-15 | End: 2022-02-16 | Stop reason: HOSPADM

## 2022-02-14 RX ORDER — SODIUM CHLORIDE, SODIUM GLUCONATE, SODIUM ACETATE, POTASSIUM CHLORIDE AND MAGNESIUM CHLORIDE 526; 502; 368; 37; 30 MG/100ML; MG/100ML; MG/100ML; MG/100ML; MG/100ML
INJECTION, SOLUTION INTRAVENOUS
Status: DISCONTINUED | OUTPATIENT
Start: 2022-02-14 | End: 2022-02-14 | Stop reason: SURG

## 2022-02-14 RX ORDER — HYDROMORPHONE HYDROCHLORIDE 1 MG/ML
0.2 INJECTION, SOLUTION INTRAMUSCULAR; INTRAVENOUS; SUBCUTANEOUS
Status: DISCONTINUED | OUTPATIENT
Start: 2022-02-14 | End: 2022-02-15

## 2022-02-14 RX ORDER — HALOPERIDOL 5 MG/ML
1 INJECTION INTRAMUSCULAR
Status: CANCELLED | OUTPATIENT
Start: 2022-02-14

## 2022-02-14 RX ORDER — OXYCODONE HYDROCHLORIDE 5 MG/1
5 TABLET ORAL EVERY 4 HOURS PRN
Status: DISCONTINUED | OUTPATIENT
Start: 2022-02-14 | End: 2022-02-15

## 2022-02-14 RX ORDER — OXYTOCIN 10 [USP'U]/ML
INJECTION, SOLUTION INTRAMUSCULAR; INTRAVENOUS PRN
Status: DISCONTINUED | OUTPATIENT
Start: 2022-02-14 | End: 2022-02-14 | Stop reason: SURG

## 2022-02-14 RX ORDER — BUPIVACAINE HYDROCHLORIDE 7.5 MG/ML
INJECTION, SOLUTION INTRASPINAL PRN
Status: DISCONTINUED | OUTPATIENT
Start: 2022-02-14 | End: 2022-02-14 | Stop reason: SURG

## 2022-02-14 RX ORDER — BISACODYL 10 MG
10 SUPPOSITORY, RECTAL RECTAL PRN
Status: DISCONTINUED | OUTPATIENT
Start: 2022-02-14 | End: 2022-02-16 | Stop reason: HOSPADM

## 2022-02-14 RX ORDER — ACETAMINOPHEN 500 MG
1000 TABLET ORAL EVERY 6 HOURS
Status: COMPLETED | OUTPATIENT
Start: 2022-02-14 | End: 2022-02-15

## 2022-02-14 RX ORDER — IBUPROFEN 800 MG/1
800 TABLET ORAL EVERY 8 HOURS PRN
Status: DISCONTINUED | OUTPATIENT
Start: 2022-02-19 | End: 2022-02-15

## 2022-02-14 RX ORDER — SODIUM CHLORIDE, SODIUM LACTATE, POTASSIUM CHLORIDE, CALCIUM CHLORIDE 600; 310; 30; 20 MG/100ML; MG/100ML; MG/100ML; MG/100ML
INJECTION, SOLUTION INTRAVENOUS ONCE
Status: DISPENSED | OUTPATIENT
Start: 2022-02-14 | End: 2022-02-15

## 2022-02-14 RX ORDER — ACETAMINOPHEN 500 MG
1000 TABLET ORAL EVERY 6 HOURS PRN
Status: DISCONTINUED | OUTPATIENT
Start: 2022-02-19 | End: 2022-02-15

## 2022-02-14 RX ORDER — ONDANSETRON 2 MG/ML
4 INJECTION INTRAMUSCULAR; INTRAVENOUS EVERY 6 HOURS PRN
Status: DISCONTINUED | OUTPATIENT
Start: 2022-02-14 | End: 2022-02-15

## 2022-02-14 RX ORDER — CARBOPROST TROMETHAMINE 250 UG/ML
250 INJECTION, SOLUTION INTRAMUSCULAR
Status: DISCONTINUED | OUTPATIENT
Start: 2022-02-14 | End: 2022-02-16 | Stop reason: HOSPADM

## 2022-02-14 RX ORDER — DOCUSATE SODIUM 100 MG/1
100 CAPSULE, LIQUID FILLED ORAL 2 TIMES DAILY PRN
Status: DISCONTINUED | OUTPATIENT
Start: 2022-02-14 | End: 2022-02-16 | Stop reason: HOSPADM

## 2022-02-14 RX ORDER — VITAMIN A ACETATE, BETA CAROTENE, ASCORBIC ACID, CHOLECALCIFEROL, .ALPHA.-TOCOPHEROL ACETATE, DL-, THIAMINE MONONITRATE, RIBOFLAVIN, NIACINAMIDE, PYRIDOXINE HYDROCHLORIDE, FOLIC ACID, CYANOCOBALAMIN, CALCIUM CARBONATE, FERROUS FUMARATE, ZINC OXIDE, CUPRIC OXIDE 3080; 12; 120; 400; 1; 1.84; 3; 20; 22; 920; 25; 200; 27; 10; 2 [IU]/1; UG/1; MG/1; [IU]/1; MG/1; MG/1; MG/1; MG/1; MG/1; [IU]/1; MG/1; MG/1; MG/1; MG/1; MG/1
1 TABLET, FILM COATED ORAL
Status: DISCONTINUED | OUTPATIENT
Start: 2022-02-15 | End: 2022-02-16 | Stop reason: HOSPADM

## 2022-02-14 RX ORDER — SODIUM CHLORIDE, SODIUM LACTATE, POTASSIUM CHLORIDE, CALCIUM CHLORIDE 600; 310; 30; 20 MG/100ML; MG/100ML; MG/100ML; MG/100ML
INJECTION, SOLUTION INTRAVENOUS PRN
Status: DISCONTINUED | OUTPATIENT
Start: 2022-02-14 | End: 2022-02-16 | Stop reason: HOSPADM

## 2022-02-14 RX ORDER — IBUPROFEN 800 MG/1
800 TABLET ORAL EVERY 8 HOURS
Status: DISCONTINUED | OUTPATIENT
Start: 2022-02-16 | End: 2022-02-15

## 2022-02-14 RX ORDER — CEFAZOLIN SODIUM 1 G/3ML
2 INJECTION, POWDER, FOR SOLUTION INTRAMUSCULAR; INTRAVENOUS ONCE
Status: COMPLETED | OUTPATIENT
Start: 2022-02-14 | End: 2022-02-14

## 2022-02-14 RX ORDER — SODIUM CHLORIDE, SODIUM LACTATE, POTASSIUM CHLORIDE, CALCIUM CHLORIDE 600; 310; 30; 20 MG/100ML; MG/100ML; MG/100ML; MG/100ML
INJECTION, SOLUTION INTRAVENOUS CONTINUOUS
Status: CANCELLED | OUTPATIENT
Start: 2022-02-14

## 2022-02-14 RX ORDER — SODIUM CHLORIDE, SODIUM LACTATE, POTASSIUM CHLORIDE, CALCIUM CHLORIDE 600; 310; 30; 20 MG/100ML; MG/100ML; MG/100ML; MG/100ML
INJECTION, SOLUTION INTRAVENOUS CONTINUOUS
Status: DISCONTINUED | OUTPATIENT
Start: 2022-02-14 | End: 2022-02-14

## 2022-02-14 RX ADMIN — OXYTOCIN 125 ML/HR: 10 INJECTION, SOLUTION INTRAMUSCULAR; INTRAVENOUS at 20:16

## 2022-02-14 RX ADMIN — SODIUM CHLORIDE, SODIUM GLUCONATE, SODIUM ACETATE, POTASSIUM CHLORIDE AND MAGNESIUM CHLORIDE: 526; 502; 368; 37; 30 INJECTION, SOLUTION INTRAVENOUS at 17:25

## 2022-02-14 RX ADMIN — KETOROLAC TROMETHAMINE 30 MG: 30 INJECTION, SOLUTION INTRAMUSCULAR at 18:00

## 2022-02-14 RX ADMIN — PHENYLEPHRINE HYDROCHLORIDE 20 MCG/MIN: 10 INJECTION INTRAVENOUS at 17:39

## 2022-02-14 RX ADMIN — SODIUM CITRATE AND CITRIC ACID MONOHYDRATE 30 ML: 500; 334 SOLUTION ORAL at 17:05

## 2022-02-14 RX ADMIN — DIPHENHYDRAMINE HYDROCHLORIDE 25 MG: 50 INJECTION, SOLUTION INTRAMUSCULAR; INTRAVENOUS at 21:41

## 2022-02-14 RX ADMIN — CEFAZOLIN 2 G: 330 INJECTION, POWDER, FOR SOLUTION INTRAMUSCULAR; INTRAVENOUS at 17:39

## 2022-02-14 RX ADMIN — OXYTOCIN 20 UNITS: 10 INJECTION, SOLUTION INTRAMUSCULAR; INTRAVENOUS at 17:51

## 2022-02-14 RX ADMIN — BUPIVACAINE HYDROCHLORIDE IN DEXTROSE 12 MG: 7.5 INJECTION, SOLUTION SUBARACHNOID at 17:37

## 2022-02-14 RX ADMIN — ACETAMINOPHEN 1000 MG: 500 TABLET ORAL at 21:43

## 2022-02-14 RX ADMIN — METOCLOPRAMIDE 10 MG: 5 INJECTION, SOLUTION INTRAMUSCULAR; INTRAVENOUS at 17:05

## 2022-02-14 RX ADMIN — MORPHINE SULFATE 0.15 MG: 0.5 INJECTION, SOLUTION EPIDURAL; INTRATHECAL; INTRAVENOUS at 17:37

## 2022-02-14 RX ADMIN — ONDANSETRON 4 MG: 2 INJECTION INTRAMUSCULAR; INTRAVENOUS at 17:58

## 2022-02-14 RX ADMIN — FAMOTIDINE 20 MG: 10 INJECTION, SOLUTION INTRAVENOUS at 17:06

## 2022-02-14 ASSESSMENT — PAIN SCALES - GENERAL
PAIN_LEVEL: 0
PAINLEVEL: 0 - NO PAIN

## 2022-02-14 ASSESSMENT — PATIENT HEALTH QUESTIONNAIRE - PHQ9
SUM OF ALL RESPONSES TO PHQ9 QUESTIONS 1 AND 2: 0
2. FEELING DOWN, DEPRESSED, IRRITABLE, OR HOPELESS: NOT AT ALL
1. LITTLE INTEREST OR PLEASURE IN DOING THINGS: NOT AT ALL

## 2022-02-14 ASSESSMENT — FIBROSIS 4 INDEX: FIB4 SCORE: 0.37

## 2022-02-15 LAB
ERYTHROCYTE [DISTWIDTH] IN BLOOD BY AUTOMATED COUNT: 43.1 FL (ref 35.9–50)
HCT VFR BLD AUTO: 28.8 % (ref 37–47)
HGB BLD-MCNC: 9.8 G/DL (ref 12–16)
MCH RBC QN AUTO: 30.7 PG (ref 27–33)
MCHC RBC AUTO-ENTMCNC: 34 G/DL (ref 33.6–35)
MCV RBC AUTO: 90.3 FL (ref 81.4–97.8)
PLATELET # BLD AUTO: 230 K/UL (ref 164–446)
PMV BLD AUTO: 10.7 FL (ref 9–12.9)
RBC # BLD AUTO: 3.19 M/UL (ref 4.2–5.4)
WBC # BLD AUTO: 11.1 K/UL (ref 4.8–10.8)

## 2022-02-15 PROCEDURE — 770002 HCHG ROOM/CARE - OB PRIVATE (112)

## 2022-02-15 PROCEDURE — 36415 COLL VENOUS BLD VENIPUNCTURE: CPT

## 2022-02-15 PROCEDURE — 700102 HCHG RX REV CODE 250 W/ 637 OVERRIDE(OP): Performed by: SPECIALIST

## 2022-02-15 PROCEDURE — A9270 NON-COVERED ITEM OR SERVICE: HCPCS | Performed by: SPECIALIST

## 2022-02-15 PROCEDURE — 700111 HCHG RX REV CODE 636 W/ 250 OVERRIDE (IP): Performed by: ANESTHESIOLOGY

## 2022-02-15 PROCEDURE — 700102 HCHG RX REV CODE 250 W/ 637 OVERRIDE(OP): Performed by: ANESTHESIOLOGY

## 2022-02-15 PROCEDURE — 700111 HCHG RX REV CODE 636 W/ 250 OVERRIDE (IP): Performed by: SPECIALIST

## 2022-02-15 PROCEDURE — 85027 COMPLETE CBC AUTOMATED: CPT

## 2022-02-15 PROCEDURE — A9270 NON-COVERED ITEM OR SERVICE: HCPCS | Performed by: ANESTHESIOLOGY

## 2022-02-15 RX ORDER — IBUPROFEN 800 MG/1
800 TABLET ORAL EVERY 8 HOURS PRN
Status: DISCONTINUED | OUTPATIENT
Start: 2022-02-19 | End: 2022-02-16 | Stop reason: HOSPADM

## 2022-02-15 RX ORDER — ACETAMINOPHEN 500 MG
1000 TABLET ORAL EVERY 6 HOURS PRN
Status: DISCONTINUED | OUTPATIENT
Start: 2022-02-19 | End: 2022-02-16 | Stop reason: HOSPADM

## 2022-02-15 RX ORDER — IBUPROFEN 800 MG/1
800 TABLET ORAL EVERY 8 HOURS
Status: DISCONTINUED | OUTPATIENT
Start: 2022-02-15 | End: 2022-02-16 | Stop reason: HOSPADM

## 2022-02-15 RX ORDER — ACETAMINOPHEN 500 MG
1000 TABLET ORAL EVERY 6 HOURS
Status: DISCONTINUED | OUTPATIENT
Start: 2022-02-16 | End: 2022-02-16 | Stop reason: HOSPADM

## 2022-02-15 RX ADMIN — OXYCODONE 5 MG: 5 TABLET ORAL at 22:08

## 2022-02-15 RX ADMIN — ACETAMINOPHEN 1000 MG: 500 TABLET ORAL at 10:15

## 2022-02-15 RX ADMIN — ENOXAPARIN SODIUM 40 MG: 40 INJECTION SUBCUTANEOUS at 06:10

## 2022-02-15 RX ADMIN — IBUPROFEN 800 MG: 800 TABLET, FILM COATED ORAL at 20:16

## 2022-02-15 RX ADMIN — PRENATAL WITH FERROUS FUM AND FOLIC ACID 1 TABLET: 3080; 920; 120; 400; 22; 1.84; 3; 20; 10; 1; 12; 200; 27; 25; 2 TABLET ORAL at 10:15

## 2022-02-15 RX ADMIN — ACETAMINOPHEN 1000 MG: 500 TABLET ORAL at 18:08

## 2022-02-15 RX ADMIN — KETOROLAC TROMETHAMINE 30 MG: 30 INJECTION, SOLUTION INTRAMUSCULAR at 12:11

## 2022-02-15 RX ADMIN — KETOROLAC TROMETHAMINE 30 MG: 30 INJECTION, SOLUTION INTRAMUSCULAR at 06:09

## 2022-02-15 RX ADMIN — KETOROLAC TROMETHAMINE 30 MG: 30 INJECTION, SOLUTION INTRAMUSCULAR at 00:06

## 2022-02-15 RX ADMIN — ACETAMINOPHEN 1000 MG: 500 TABLET ORAL at 04:11

## 2022-02-15 NOTE — PROGRESS NOTES
Progress Note    Subjective:   Doing well. No issues or concerns. Pain well controlled. No sig bleeding or discharge. BF well.    Objective Data:  Recent Labs     02/14/22  1630 02/15/22  0526   WBC 10.7 11.1*   RBC 4.25 3.19*   HEMOGLOBIN 13.2 9.8*   HEMATOCRIT 37.4 28.8*   MCV 88.0 90.3   MCH 31.1 30.7   MCHC 35.3* 34.0   RDW 42.0 43.1   PLATELETCT 289 230   MPV 10.5 10.7           Vitals:    02/15/22 0400 02/15/22 0500 02/15/22 0609 02/15/22 0800   BP:   (!) 91/53    Pulse: 91 83 82 94   Resp: 17 16 16 20   Temp:   37.1 °C (98.7 °F)    TempSrc:   Temporal    SpO2: 95% 94% 94% 96%   Weight:       Height:         Abdomen: soft non tender fundus with covered incision with only min ttp noted 1/4  Perineum: no sig bleeding or discharge  Ext: non tender calves    Intake/Output Summary (Last 24 hours) at 2/15/2022 0927  Last data filed at 2/15/2022 0645  Gross per 24 hour   Intake 800 ml   Output 1600 ml   Net -800 ml       Current Facility-Administered Medications   Medication Dose Route Frequency Provider Last Rate Last Admin   • famotidine (PEPCID) injection 20 mg  20 mg Intravenous BID Jorge Parker M.D.   20 mg at 02/14/22 1706   • oxytocin (PITOCIN) infusion (for postpartum)  2,000 mL/hr Intravenous Once Robel Justin M.D.        Followed by   • oxytocin (PITOCIN) infusion (for postpartum)  125 mL/hr Intravenous Continuous Robel Justin M.D. 125 mL/hr at 02/14/22 2016 125 mL/hr at 02/14/22 2016   • lactated ringers infusion   Intravenous PRN Robel Justin M.D.       • [START ON 2/16/2022] ibuprofen (MOTRIN) tablet 800 mg  800 mg Oral Q8HRS Robel Justin M.D.        Followed by   • [START ON 2/19/2022] ibuprofen (MOTRIN) tablet 800 mg  800 mg Oral Q8HRS PRN Robel Justin M.D.       • acetaminophen (TYLENOL) tablet 1,000 mg  1,000 mg Oral Q6HRS Robel Justin M.D.        Followed by   • [START ON 2/19/2022] acetaminophen (TYLENOL) tablet 1,000 mg  1,000 mg Oral Q6HRS PRN Robel Justin M.D.        • oxyCODONE immediate-release (ROXICODONE) tablet 5 mg  5 mg Oral Q4HRS PRN Robel Justin M.D.       • oxyCODONE immediate release (ROXICODONE) tablet 10 mg  10 mg Oral Q4HRS PRN Robel Justin M.D.       • ondansetron (ZOFRAN) syringe/vial injection 4 mg  4 mg Intravenous Q6HRS PRN Robel Justin M.D.        Or   • ondansetron (ZOFRAN ODT) dispertab 4 mg  4 mg Oral Q6HRS PRN Robel Justin M.D.       • diphenhydrAMINE (BENADRYL) tablet/capsule 25 mg  25 mg Oral Q6HRS PRN Robel Justin M.D.        Or   • diphenhydrAMINE (BENADRYL) injection 25 mg  25 mg Intravenous Q6HRS PRN Robel Justin M.D.       • docusate sodium (COLACE) capsule 100 mg  100 mg Oral BID PRN Robel Justin M.D.       • tetanus-dipth-acell pertussis (Tdap) inj 0.5 mL  0.5 mL Intramuscular Once PRN Robel Justin M.D.       • measles, mumps and rubella vaccine (MMR) injection 0.5 mL  0.5 mL Subcutaneous Once PRN Robel Justin M.D.       • LR infusion   Intravenous Once Robel Justin M.D.   Dose not Required at 02/14/22 2130   • oxytocin (PITOCIN) infusion (for postpartum)  2,000 mL/hr Intravenous Once Robel Justin M.D.   Dose not Required at 02/14/22 2130    Followed by   • oxytocin (PITOCIN) infusion (for postpartum)  125 mL/hr Intravenous Continuous Robel Justin M.D.   Dose not Required at 02/14/22 2130   • oxytocin (PITOCIN) injection 10 Units  10 Units Intramuscular Once PRN Robel Justin M.D.       • miSOPROStol (CYTOTEC) tablet 800 mcg  800 mcg Rectal Once PRN Robel Justin M.D.       • methylergonovine (METHERGINE) injection 0.2 mg  0.2 mg Intramuscular Once PRN Robel Justin M.D.       • carboPROST (HEMABATE) injection 250 mcg  250 mcg Intramuscular Once PRN Robel Justin M.D.       • enoxaparin (LOVENOX) inj 40 mg  40 mg Subcutaneous DAILY Robel Justin M.D.   40 mg at 02/15/22 0610   • metoclopramide (REGLAN) injection 10 mg  10 mg Intravenous Q6HRS PRN Robel Justin M.D.       •  bisacodyl (DULCOLAX) suppository 10 mg  10 mg Rectal PRN Robel Justin M.D.       • magnesium hydroxide (MILK OF MAGNESIA) suspension 30 mL  30 mL Oral Q6HRS PRN Robel Justin M.D.       • prenatal plus vitamin (STUARTNATAL 1+1) 27-1 MG tablet 1 Tablet  1 Tablet Oral Daily-0800 Robel Justin M.D.       • acetaminophen (TYLENOL) tablet 1,000 mg  1,000 mg Oral Q6HR Jorge Parker M.D.   1,000 mg at 02/15/22 0411   • ketorolac (TORADOL) injection 30 mg  30 mg Intravenous Q6HRS Jorge Parker M.D.   30 mg at 02/15/22 0609   • oxyCODONE immediate-release (ROXICODONE) tablet 5 mg  5 mg Oral Q4HRS PRN Jorge Parker M.D.       • oxyCODONE immediate release (ROXICODONE) tablet 10 mg  10 mg Oral Q4HRS PRN Jorge Parker M.D.       • HYDROmorphone (Dilaudid) injection 0.2 mg  0.2 mg Intravenous Q2HRS PRJESUS Parker M.D.       • HYDROmorphone (Dilaudid) injection 0.4 mg  0.4 mg Intravenous Q2HRS PRJESUS Parker M.D.       • ePHEDrine injection 10 mg  10 mg Intravenous Q5 MIN PRN Jorge Parker M.D.       • diphenhydrAMINE (BENADRYL) injection 12.5 mg  12.5 mg Intravenous Q6HRS PRJESUS Parker M.D.        Or   • diphenhydrAMINE (BENADRYL) injection 25 mg  25 mg Intravenous Q6HRS PRJESUS Parker M.D.   25 mg at 02/14/22 2141    Or   • naloxone (NARCAN) 0.4 mg in NS 1,000 mL infusion  0.4 mg Intravenous PRN Jorge Parker M.D.       • ondansetron (ZOFRAN) syringe/vial injection 4 mg  4 mg Intravenous Q6HRS PRJESUS Parker M.D.       • diphenhydrAMINE (BENADRYL) injection 12.5 mg  12.5 mg Intravenous Q6HRS PRN Jorge Parker M.D.       • naloxone (NARCAN) 0.4 mg in NS 1,000 mL infusion  0.4 mg Intravenous PRN Jorge Parker M.D.           A/P S/P Primary LTCS now POD #1. Plan to proceed with the usual pp management and will discharge home tomorrow if continues to meet discharge criteria per patients wishes. All questions were answered.

## 2022-02-15 NOTE — PROGRESS NOTES
1600 assumed care of pt in LDA6. Checked [place] and US were applied. Sergio labs, prepped pt for primary c/s d/t IUGR and HSV.     1725 in OR. Spinal performed, pt tolerated procedure well.     1744 RT called to OR. Tied up in pt delivery    1745 skin incision    1746 Transition nurse called to OR. Tied up with RT in pt room    1750 AROM clear. No RT or transition yet, RN circulator Francis gowned up sterile and acted as transition nurse. Vacuum required as baby was wedged in pelvis. No pop off. Applied by Justin. 15 seconds, 1 pull     1751 viable baby girl born. 8/8 apgars. RT and transition in room, Francis handoff baby at warmer. Placenta removed manually intact    Out OR @ 1825    In PACU 1827    Recovery complete at 1927    Transported up to PP at 1945 after taking baby's BS for jittery s/sx. Pt handoff report given to MATT Santiago. pts stable. Fundal rubbed, bands checked, POC discussed. Passed on Dr molina requests about starting pt on blood thinners and need to f/u.

## 2022-02-15 NOTE — ANESTHESIA TIME REPORT
Anesthesia Start and Stop Event Times     Date Time Event    2022 1702 Ready for Procedure     1725 Anesthesia Start     1830 Anesthesia Stop        Responsible Staff  22    Name Role Begin End    Jorge Parker M.D. Anesth 1725 1830        Preop Diagnosis (Free Text):  Pre-op Diagnosis     HERPES OUTBREAK, IUGR   38.7 WEEKS        Preop Diagnosis (Codes):  Diagnosis Information     Diagnosis Code(s): Status post primary low transverse  section [Z98.891]        Premium Reason  A. 3PM - 7AM    Comments:

## 2022-02-15 NOTE — PROGRESS NOTES
Received patient from labor and delivery via gurney with Jessica WINTER. Second bag of pitocin is running at 125 mL/hr, jasmine catheter in place, SCD's are on. Assessment done. Fundus firm. Lochia light rubra. Reviewed POC for this evening with the pt. Pt breast fed baby in L&D. Advised pt to call for assistance with latching or any needs.  Call light within reach. Will continue to monitor VS.    0330: got pt up to the bathroom. Pt walked with a steady gait using hand held assistance. Danyelle care performed. Pt walked back to the bed. Educated pt on using arms and legs to get her into bed and positioned comfortably in bed. Educated pt on jasmine catheter removal later this morning and explained that pt has 6 hours to void after removal of catheter.     0605: order for abdominal binder placed.    0645: jasmine catheter removed.

## 2022-02-15 NOTE — CARE PLAN
The patient is Stable - Low risk of patient condition declining or worsening    Shift Goals  Clinical Goals:  (pain control, lochia wnl)  Patient Goals: healthy baby healthy mom  Family Goals: healthy baby healthy mom    Progress made toward(s) clinical / shift goals:  Fundus firm, lochia light. Pt's pain controlled by PRN pain medications    Patient is not progressing towards the following goals:

## 2022-02-15 NOTE — PROGRESS NOTES
Assumed care. Assessment complete. VSS. Fundus firm, lochia light. Incision dressing CDI. Pt ambulating and voiding without difficulty. Pt would like pain medication as needed. Call light within reach. Will continue to monitor.

## 2022-02-15 NOTE — OR SURGEON
Immediate Post OP Note    PreOp Diagnosis: Intrauterine pregnancy at 38 6/7 weeks gestation, intrauterine growth restriction, genital herpes right labial outbreak.      PostOp Diagnosis: Same; delivered      Procedure(s):   SECTION, PRIMARY    Surgeon(s):  NISSA Brooks M.D.    Anesthesiologist/Type of Anesthesia:  Anesthesiologist: Jorge Parker M.D./* No anesthesia type entered *    Surgical Staff:  Circulator: Jessica Blanco R.N.  Scrub Person: Anabell LEE&NILSA Circulator Assistant: Danyelle Pruitt R.N.    Specimens removed if any:  None    Estimated Blood Loss: 600 ccs    Findings: Normal uterus with normal appearing adnexa, vigorous female infant with Apgars of 8/8 at one and five minutes respectively, placenta delivered spontaneous and intact.    Complications: None         2022 6:20 PM Robel Justin M.D.

## 2022-02-15 NOTE — LACTATION NOTE
This note was copied from a baby's chart.   p c/s at 38+6w on 22 at 1751.  Pregnancy complicated with IUGR, infnat with Shox gene.  Primary c/s indicated for active herpes outbreak at time of delivery.     Birth Weight 2770 g     Upon entering the room MOB skin to skin with infant after finishing a feed.  MOB states breastfeeding is going well without any concerns of difficulties.  Discussed normal feeding patterns of babies including tendencies of cluster feeding on second night.  Reviewed key points of a deep latch, benefits of skin to skin.  Ensure 8 feeds within 24hrs and to feed baby at 4hrs by spoon or breast.  Reviewed hand expression and given [place] Breastfeeding and hand expression videos to review.    Will follow up tomorrow.

## 2022-02-15 NOTE — PROGRESS NOTES
Call placed to Dr. Justin and informed him of the pt morning CBC results and BP at 91/53. Pt has no c/o headache, blurred vision, dizziness, or nausea. Pt ambulated this morning and walked with a steady, slow gait. Pt bleeding has been light rubra. No new orders at this time.

## 2022-02-15 NOTE — ANESTHESIA POSTPROCEDURE EVALUATION
Patient: Flaca Nickerson    Procedure Summary     Date: 22 Room / Location: [facility] OR 01 / SURGERY LABOR AND DELIVERY    Anesthesia Start:  Anesthesia Stop:     Procedure:  SECTION, PRIMARY Diagnosis:       Status post primary low transverse  section      (HERPES OUTBREAK, IUGR )      (38.7 WEEKS)    Surgeons: Robel Justin M.D. Responsible Provider: Jorge Parker M.D.    Anesthesia Type: spinal ASA Status: 2          Final Anesthesia Type: spinal  Last vitals  BP        Temp   36.7 °C (98 °F)    Pulse       Resp   16    SpO2   95 %      Anesthesia Post Evaluation    Patient location during evaluation: floor  Patient participation: complete - patient participated  Level of consciousness: awake and alert  Pain score: 0    Airway patency: patent  Anesthetic complications: no  Cardiovascular status: hemodynamically stable  Respiratory status: acceptable  Hydration status: euvolemic    PONV: none          No complications documented.     Nurse Pain Score: 0 (NPRS)

## 2022-02-15 NOTE — ANESTHESIA PREPROCEDURE EVALUATION
Case: [id number] Date/Time: 22    Procedure:  SECTION, PRIMARY    Diagnosis: Status post primary low transverse  section [Z98.891]    Pre-op diagnosis:       HERPES OUTBREAK, IUGR       38.7 WEEKS    Location: [facility] OR 01 / SURGERY LABOR AND DELIVERY    Surgeons: Robel Justin M.D.          Relevant Problems   NEURO   (positive) History of pulmonary embolism      CARDIAC   (positive) Behcet's disease (HCC)       Physical Exam    Airway   Mallampati: II  TM distance: >3 FB  Neck ROM: full       Cardiovascular - normal exam  Rhythm: regular  Rate: normal  (-) murmur     Dental - normal exam           Pulmonary - normal exam  Breath sounds clear to auscultation     Abdominal    Neurological - normal exam                 Anesthesia Plan    ASA 2       Plan - spinal   Neuraxial block will be primary anesthetic                Postoperative Plan: Postoperative administration of opioids is intended.    Pertinent diagnostic labs and testing reviewed    Informed Consent:    Anesthetic plan and risks discussed with patient.

## 2022-02-15 NOTE — OP REPORT
DATE OF SERVICE:  2022     PREOPERATIVE DIAGNOSES:  Intrauterine pregnancy at 38 6/7 weeks gestation, intrauterine growth restriction, genital herpes right labial outbreak.     POSTOPERATIVE DIAGNOSES: Same; delivered     PROCEDURE PERFORMED:  Primary low transverse  section.     SURGEON:  Robel Justin MD     ASSISTANT:  Federico Orosco MD     ANESTHESIA:  Spinal     ANESTHESIOLOGIST:  Anesthesiologist: Jorge Parker M.D.     ESTIMATED BLOOD LOSS FOR THE PROCEDURE: 600 mL.     FINDINGS:  Normal uterus, tubes, ovaries.  Vigorous  female infant with Apgars of 8 and 8  at 1 and 5 minutes respectively.  Placenta intact with 3-vessel cord.     DESCRIPTION OF PROCEDURE:  The patient was taken to the operating room where    spinal anesthetic was placed without difficulty.  The patient was then prepped  and draped in the usual sterile fashion.  A Pfannenstiel skin incision was    made and carried down sharply to the level of the rectus fascia.  Rectus fascia was nicked in the midline.  Fascial incision was extended laterally in both directions with the use of Melendez scissors.  Kocher clamps were placed on the inferior and superior edge of the    rectus fascia, which was used to dissect away from the underlying rectus    muscles.  Rectus muscles were  in the midline.  Peritoneum was    entered sharply.  Peritoneal incision was extended superior and inferior    carefully avoiding the bladder.  Bladder blade was placed.  Vesicouterine    peritoneum was grasped, incised, the bladder flap was created.  Uterine    incision was made with the use of scalpel and extended laterally in both    directions.  Clear amniotic fluid was noted upon entry into the uterine    cavity.  With gentle fundal pressure, the infant's head was delivered with the use of the vacuum given an unengaged head over one application without any pop offs followed by easy delivery of the shoulders and body.  Cord was clamped and cut.   Infant was handed to waiting nursing staff. Apgars were 8 and 8 at 1 and 5 minutes respectively.  Placenta was then  delivered intact with 3-vessel cord.  The uterus was then brought into maternal abdomen, wrapped in a wet lap sponge, dry gauze curetted.  Uterine incision was reapproximated with #1 chromic in a running interlocking fashion    in one segment.  Excellent hemostasis was appreciated.  The uterus was then    returned back to the maternal abdomen.  Gutters were freed of clots.  Uterine    incision and subfascial space were noted to be hemostatic.  Peritoneum was    then reapproximated with 2-0 Vicryl running fashion in one segment.  The    fascia was then reapproximated with 0 Vicryl in running fashion in one    segment.  Subcutaneous tissue was irrigated.  Hemostasis was achieved with the   use of electrocautery.  Skin was closed with a 4-0 subcuticular stitch.     Patient tolerated the procedure well and was taken to the recovery in stable    condition.        ____________________________________     JABIER GRAHAM MD

## 2022-02-15 NOTE — CARE PLAN
The patient is Stable - Low risk of patient condition declining or worsening    Shift Goals  Clinical Goals: maintain tolerable pain level  Patient Goals: healthy baby healthy mom  Family Goals: healthy baby healthy mom    Progress made toward(s) clinical / shift goals:  pt pain has been between a 2-4 and made tolerable with the use of scheduled pain medication. Pt aware of PRN medicine available to her and will call for it when needed. Pt did get up and ambulate in the room using hand held assistance.     Patient is not progressing towards the following goals:

## 2022-02-15 NOTE — ANESTHESIA PROCEDURE NOTES
Spinal Block    Date/Time: 2/14/2022 5:30 PM  Performed by: Jorge Parker M.D.  Authorized by: Jorge Parker M.D.     Start Time:  2/14/2022 5:30 PM  End Time:  2/14/2022 5:38 PM  Reason for Block: primary anesthetic    patient identified, IV checked, site marked, risks and benefits discussed, surgical consent, monitors and equipment checked, pre-op evaluation and timeout performed    Patient Position:  Sitting  Prep: ChloraPrep, patient draped and sterile technique    Monitoring:  Blood pressure, continuous pulse oximetry and heart rate  Approach:  Midline  Location:  L3-4  Injection Technique:  Single-shot  Skin infiltration:  Lidocaine  Strength:  1%  Dose:  3ml  Needle Type:  Pencan  Needle Gauge:  25 G  CSF flowing pre/post injection:  Yes  Sensory Level:  T4

## 2022-02-16 VITALS
TEMPERATURE: 98 F | SYSTOLIC BLOOD PRESSURE: 101 MMHG | OXYGEN SATURATION: 94 % | WEIGHT: 162 LBS | HEART RATE: 76 BPM | DIASTOLIC BLOOD PRESSURE: 60 MMHG | BODY MASS INDEX: 30.58 KG/M2 | HEIGHT: 61 IN | RESPIRATION RATE: 18 BRPM

## 2022-02-16 PROCEDURE — 700102 HCHG RX REV CODE 250 W/ 637 OVERRIDE(OP): Performed by: SPECIALIST

## 2022-02-16 PROCEDURE — A9270 NON-COVERED ITEM OR SERVICE: HCPCS | Performed by: SPECIALIST

## 2022-02-16 PROCEDURE — 700111 HCHG RX REV CODE 636 W/ 250 OVERRIDE (IP): Performed by: SPECIALIST

## 2022-02-16 RX ADMIN — ACETAMINOPHEN 1000 MG: 500 TABLET ORAL at 00:10

## 2022-02-16 RX ADMIN — OXYCODONE 5 MG: 5 TABLET ORAL at 10:27

## 2022-02-16 RX ADMIN — PRENATAL WITH FERROUS FUM AND FOLIC ACID 1 TABLET: 3080; 920; 120; 400; 22; 1.84; 3; 20; 10; 1; 12; 200; 27; 25; 2 TABLET ORAL at 08:33

## 2022-02-16 RX ADMIN — ACETAMINOPHEN 1000 MG: 500 TABLET ORAL at 13:11

## 2022-02-16 RX ADMIN — ACETAMINOPHEN 1000 MG: 500 TABLET ORAL at 06:08

## 2022-02-16 RX ADMIN — IBUPROFEN 800 MG: 800 TABLET, FILM COATED ORAL at 04:01

## 2022-02-16 RX ADMIN — ENOXAPARIN SODIUM 40 MG: 40 INJECTION SUBCUTANEOUS at 06:08

## 2022-02-16 RX ADMIN — IBUPROFEN 800 MG: 800 TABLET, FILM COATED ORAL at 13:10

## 2022-02-16 RX ADMIN — DOCUSATE SODIUM 100 MG: 100 CAPSULE, LIQUID FILLED ORAL at 08:33

## 2022-02-16 ASSESSMENT — EDINBURGH POSTNATAL DEPRESSION SCALE (EPDS)
I HAVE BEEN ANXIOUS OR WORRIED FOR NO GOOD REASON: YES, SOMETIMES
I HAVE FELT SAD OR MISERABLE: NO, NOT AT ALL
I HAVE BEEN SO UNHAPPY THAT I HAVE HAD DIFFICULTY SLEEPING: NOT AT ALL
THE THOUGHT OF HARMING MYSELF HAS OCCURRED TO ME: NEVER
I HAVE FELT SCARED OR PANICKY FOR NO GOOD REASON: NO, NOT MUCH
THINGS HAVE BEEN GETTING ON TOP OF ME: NO, MOST OF THE TIME I HAVE COPED QUITE WELL
I HAVE LOOKED FORWARD WITH ENJOYMENT TO THINGS: AS MUCH AS I EVER DID
I HAVE BEEN ABLE TO LAUGH AND SEE THE FUNNY SIDE OF THINGS: AS MUCH AS I ALWAYS COULD
I HAVE BEEN SO UNHAPPY THAT I HAVE BEEN CRYING: NO, NEVER
I HAVE BLAMED MYSELF UNNECESSARILY WHEN THINGS WENT WRONG: NOT VERY OFTEN

## 2022-02-16 ASSESSMENT — PAIN DESCRIPTION - PAIN TYPE: TYPE: SURGICAL PAIN

## 2022-02-16 NOTE — PROGRESS NOTES
Discharge instructions reviewed by patient.  screen, patient and baby f/u appts also reviewed by patient . Car seat present. Birth certificate done.  patient to  medications from her pharmacy.

## 2022-02-16 NOTE — DISCHARGE INSTRUCTIONS
PATIENT DISCHARGE EDUCATION INSTRUCTION SHEET  REASONS TO CALL YOUR OBSTETRICIAN  · Persistent fever, shaking, chills (Temperature higher than 100.4) may indicate you have an infection  · Heavy bleeding: soaking more than 1 pad per hour; Passing clots an egg-sized clot or bigger may mean you have an postpartum hemorrhage  · Foul odor from vagina or bad smelling or discolored discharge or blood  · Breast infection (Mastitis symptoms); breast pain, chills, fever, redness or red streaks, may feel flu like symptoms  · Urinary pain, burning or frequency  · Incision that is not healing, increased redness, swelling, tenderness or pain, or any pus from episiotomy or  site may mean you have an infection  · Redness, swelling, warmth, or painful to touch in the calf area of your leg may mean you have a blood clot  · Severe or intensified depression, thoughts or feelings of wanting to hurt yourself or someone else   · Pain in chest, obstructed breathing or shortness of breath (trouble catching your breath) may mean you are having a postpartum complication. Call your provider immediately   · Headache that does not get better, even after taking medicine, a bad headache with vision changes or pain in the upper right area of your belly may mean you have high blood pressure or post birth preeclampsia. Call your provider immediately    HAND WASHING  All family and friends should wash their hands:  · Before and after holding the baby  · Before feeding the baby  · After using the restroom or changing the baby's diaper    WOUND CARE  Ask your physician for additional care instructions. In general:  ·  Incision:  · May shower and pat incision dry   · Keep the incision clean and dry  · There should not be any opening or pus from the incision  · Continue to walk at home 3 times a day   · Do NOT lift anything heavier than your baby (over 10 pounds)  · Encourage family to help participate in care of the  to allow  rest and mom time to heal  · Episiotomy/Laceration  · May use finn-spray bottle, witch hazel pads and dermaplast spray for comfort  · Use finn-spray bottle after urinating to cleanse perineal area  · To prevent burning during urination spray finn-water bottle on labial area   · Pat perineal area dry until episiotomy/laceration is healed  · Continue to use finn-bottle until bleeding stops as needed  · If have a 2nd degree laceration or greater, a Sitz bath can offer relief from soreness, burning, and inflammation   · Sitz Bath   · Sit in 6 inches of warm water and soak laceration as needed until the laceration heals    VAGINAL CARE AND BLEEDING  · Nothing inside vagina for 6 weeks:   · No sexual intercourse, tampons or douching  · Bleeding may continue for 2-4 weeks. Amount and color may vary  · Soaking 1 pad or more in an hour for several hours is considered heavy bleeding  · Passing large egg sized blood clots can be concerning  · If you feel like you have heavy bleeding or are having increasing amount of blood clots call your Obstetrician immediately  · If you begin feeling faint upon standing, feeling sick to your stomach, have clammy skin, a really fast heartbeat, have chills, start feeling confused, dizzy, sleepy or weak, or feeling like you're going to faint call your Obstetrician immediately    HYPERTENSION   Preeclampsia or gestational hypertension are types of high blood pressure that only pregnant women can get. It is important for you to be aware of symptoms to seek early intervention and treatment. If you have any of these symptoms immediately call your Obstetrician    · Vision changes or blurred vision   · Severe headache or pain that is unrelieved with medication and will not go away  · Persistent pain in upper abdomen or shoulder   · Increased swelling of face, feet, or hands  · Difficulty breathing or shortness of breath at rest  · Urinating less than usual    URINATION AND BOWEL MOVEMENTS  · Eating  "more fiber (bran cereal, fruits, and vegetables) and drinking plenty of fluids will help to avoid constipation  · Urinary frequency and urgency after childbirth is normal  · If you experience any urinary pain, burning or frequency call your provider    BIRTH CONTROL  · It is possible to become pregnant at any time after delivery and while breastfeeding  · Plan to discuss a method of birth control with your physician at your post delivery follow up visit    POSTPARTUM BLUES  During the first few days after birth, you may experience a sense of the \"blues\" which may include impatience, irritability or even crying. These feelings come and go quickly. However, as many as 1 in 10 women experience emotional symptoms known as postpartum depression.     POSTPARTUM DEPRESSION    May start as early as the second or third day after delivery or take several weeks or months to develop. Symptoms of \"blues\" are present, but are more intense: Crying spells; loss of appetite; feelings of hopelessness or loss of control; fear of touching the baby; over concern or no concern at all about the baby; little or no concern about your own appearance/caring for yourself; and/or inability to sleep or excessive sleeping. Contact your Obstetrician if you are experiencing any of these symptoms     PREVENTING SHAKEN BABY  If you are angry or stressed, PUT THE BABY IN THE CRIB, step away, take some deep breaths, and wait until you are calm to care for the baby. DO NOT SHAKE THE BABY. You are not alone, call a supporter for help.  · [facility] 24/7 crisis call line (701-128-7840) or (1-492.171.4060)  · You can also text them, text \"ANSWER\" (562806)      "

## 2022-02-16 NOTE — DISCHARGE SUMMARY
DATE OF ADMISSION:  2022   DATE OF DISCHARGE:  2022     DISCHARGE DIAGNOSES:    1.  Status post a primary low transverse  section for intrauterine   growth restriction and a genital herpes outbreak on the right labia.  2.  Uncomplicated postoperative and postpartum course.     HISTORY OF PRESENT ILLNESS:  This is a 31-year-old  4, para 1,   spontaneous  2, presenting at 38 and 6/7th weeks' gestation based on a   recommendation by the [facility] risk pregnancy center to deliver between 38 and 39   weeks' gestation for a known intrauterine growth restriction presenting days   prior to delivery with a vesicular eruption on the right labia with a known   history of genital herpes and after counseling, the patient now elects to   proceed forward with a primary low transverse  section for delivery.    Risks, benefits and alternatives have been addressed.  She has asked   appropriate questions, signed the appropriate consents and wishes to proceed   forward with admission and delivery at this time.     PAST MEDICAL HISTORY AND PHYSICAL EXAMINATION:  Can be found in dictated   history and physical.     ASSESSMENT AND PLAN:  A 31-year-old  4, para 1 at term with overall   reassuring fetal status with known intrauterine growth restriction, followed   by our office in the high [facility] pregnancy center, now with a genital herpes   outbreak, electing to proceed forward with delivery via primary low transverse    section.     HOSPITAL COURSE:  As stated above, the patient did undergo a primary low   transverse  section with an estimated blood loss of 600 mL, Apgars   were 8 and 8 at one and five minutes respectively.  Placenta was delivered   spontaneous and intact with 3-vessel cord.  Her postoperative course was   unremarkable and on postoperative day #2, she was ambulating and voiding well,   tolerating a regular diet.  Her pain was well controlled.  She was afebrile    throughout her entire postoperative course.  Her incision was clean, dry and   intact, covered with a dressing.  She had met all discharge criteria and was   interested in proceeding forward with discharge home with follow up in 1 week   for an incisional evaluation.     DISCHARGE MEDICATIONS:  Motrin and Norco.     DISCHARGE INSTRUCTIONS:  She is to call with any increased temperature greater   than 100.4, increasing vaginal bleeding, abdominal pain unrelieved with any   p.o. pain medication.  Call with any other questions or concerns.        ______________________________  MD APPLE Patel/JULIANNE/MARCIA    DD:  02/16/2022 07:57  DT:  02/16/2022 09:20    Job#:  [id number]

## 2022-02-16 NOTE — PROGRESS NOTES
Progress Note    Subjective:   Doing well. Pain well controlled with po pain medication. She is breastfeeding well. No sig bleeding or discharge noted. She is ambulating and voiding well. No issues or concerns and wishes to go home today.    Objective Data:  Recent Labs     02/14/22  1630 02/15/22  0526   WBC 10.7 11.1*   RBC 4.25 3.19*   HEMOGLOBIN 13.2 9.8*   HEMATOCRIT 37.4 28.8*   MCV 88.0 90.3   MCH 31.1 30.7   MCHC 35.3* 34.0   RDW 42.0 43.1   PLATELETCT 289 230   MPV 10.5 10.7           Vitals:    02/15/22 1000 02/15/22 1400 02/15/22 1800 02/16/22 0600   BP: 105/65 102/62 110/71 101/60   Pulse: 91 84 96 76   Resp: 18 16 16 18   Temp: 37.1 °C (98.8 °F) 36.7 °C (98 °F) 36.6 °C (97.9 °F) 36.7 °C (98 °F)   TempSrc: Temporal Temporal Temporal Temporal   SpO2: 94% 94% 95% 94%   Weight:       Height:         Abdomen: soft non tender fundus at umbilicus with covered incision with 1/4 ttp at incision  Perineum: no sig bleeding or discharge noted  Ext: non tender calves    No intake or output data in the 24 hours ending 02/16/22 0751    Current Facility-Administered Medications   Medication Dose Route Frequency Provider Last Rate Last Admin   • ibuprofen (MOTRIN) tablet 800 mg  800 mg Oral Q8HRS Robel Justin M.D.   800 mg at 02/16/22 0401    Followed by   • [START ON 2/19/2022] ibuprofen (MOTRIN) tablet 800 mg  800 mg Oral Q8HRS PRN Robel Justin M.D.       • acetaminophen (TYLENOL) tablet 1,000 mg  1,000 mg Oral Q6HR Robel Justin M.D.   1,000 mg at 02/16/22 0608    Followed by   • [START ON 2/19/2022] acetaminophen (TYLENOL) tablet 1,000 mg  1,000 mg Oral Q6HRS PRN Robel Justin M.D.       • famotidine (PEPCID) injection 20 mg  20 mg Intravenous BID Jorge Parker M.D.   20 mg at 02/14/22 1706   • oxytocin (PITOCIN) infusion (for postpartum)  125 mL/hr Intravenous Continuous Robel Justin M.D. 125 mL/hr at 02/14/22 2016 125 mL/hr at 02/14/22 2016   • lactated ringers infusion   Intravenous PRN  Robel Justin M.D.       • oxyCODONE immediate-release (ROXICODONE) tablet 5 mg  5 mg Oral Q4HRS PRN Robel Justin M.D.   5 mg at 02/15/22 2208   • oxyCODONE immediate release (ROXICODONE) tablet 10 mg  10 mg Oral Q4HRS PRN Robel Justin M.D.       • ondansetron (ZOFRAN) syringe/vial injection 4 mg  4 mg Intravenous Q6HRS PRN Robel Justin M.D.        Or   • ondansetron (ZOFRAN ODT) dispertab 4 mg  4 mg Oral Q6HRS PRN Robel Justin M.D.       • diphenhydrAMINE (BENADRYL) tablet/capsule 25 mg  25 mg Oral Q6HRS PRN Robel Justin M.D.        Or   • diphenhydrAMINE (BENADRYL) injection 25 mg  25 mg Intravenous Q6HRS PRN Robel Justin M.D.       • docusate sodium (COLACE) capsule 100 mg  100 mg Oral BID PRN Robel Justin M.D.       • tetanus-dipth-acell pertussis (Tdap) inj 0.5 mL  0.5 mL Intramuscular Once PRN Robel Justin M.D.       • measles, mumps and rubella vaccine (MMR) injection 0.5 mL  0.5 mL Subcutaneous Once PRN Robel Justin M.D.       • oxytocin (PITOCIN) infusion (for postpartum)  125 mL/hr Intravenous Continuous Robel Justin M.D.   Dose not Required at 02/14/22 2130   • oxytocin (PITOCIN) injection 10 Units  10 Units Intramuscular Once PRN Robel Justin M.D.       • miSOPROStol (CYTOTEC) tablet 800 mcg  800 mcg Rectal Once PRN Robel Justin M.D.       • methylergonovine (METHERGINE) injection 0.2 mg  0.2 mg Intramuscular Once PRN Robel Justin M.D.       • carboPROST (HEMABATE) injection 250 mcg  250 mcg Intramuscular Once PRN Robel Justin M.D.       • enoxaparin (LOVENOX) inj 40 mg  40 mg Subcutaneous DAILY Robel Justin M.D.   40 mg at 02/16/22 0608   • metoclopramide (REGLAN) injection 10 mg  10 mg Intravenous Q6HRS PRN Robel Justin M.D.       • bisacodyl (DULCOLAX) suppository 10 mg  10 mg Rectal PRN Robel Justin M.D.       • magnesium hydroxide (MILK OF MAGNESIA) suspension 30 mL  30 mL Oral Q6HRS PRN Robel Justin M.D.       • prenatal  plus vitamin (STUARTNATAL 1+1) 27-1 MG tablet 1 Tablet  1 Tablet Oral Daily-0800 Robel Justin M.D.   1 Tablet at 02/15/22 1015       A/P S/P Primary LTCS now POD #2. She is doing well. No issues or concerns and the patient wishes to go home today and has met discharge criteria. Will discharge home today with f/u in one week for incisional evaluation.

## 2022-02-16 NOTE — PROGRESS NOTES
Report received from Jen WINTER. Pt assessment complete. Pt states pain at a 4; motrin given and Roxicodone 5 mg offered but declined at this time. Pt will call for it if needed. Reviewed POC with pt. Pt has been ambulating in the room and has showered earlier today. Pt states only voiding a small amount. I brought in pitcher of cranberry juice for pt to drink. Will continue postpartum care.     2205: pt called for pain of 5; Roxicodone 5 mg given. Abdominal binder placed on pt.     Pt has been voiding adequate amount. Pt does have c/o burning sensation internally while she voids.

## 2022-02-16 NOTE — PROGRESS NOTES
Assessment done. Pt.states pain is at a 2 and pain medication is not needed.Incision covered with mepilex dressing  without drainage, swelling or redness.Lochia scant to light. Pt able to ambulate in room without dizziness or assistance.Pt. not passing flatus. no problems urinating. Fob in room ,supportive.plan of care for today discussed.

## 2022-02-16 NOTE — CARE PLAN
The patient is Stable - Low risk of patient condition declining or worsening    Shift Goals  Clinical Goals: maintain tolerable pain level; ambulate  Patient Goals: healthy baby healthy mom  Family Goals: healthy baby healthy mom    Progress made toward(s) clinical / shift goals:  pt pain has been 2-5. The use of pain medication has helped lower pain to a tolerable level for the pt. Pt has ambulated in the room and to the bathroom.     Patient is not progressing towards the following goals:

## 2022-02-17 NOTE — LACTATION NOTE
This note was copied from a baby's chart.  Mom is a P2 who delivered baby girl weighing 6 # 1.7 oz at 38.6 wks.   Mom reports darker and enlarged areolas during pregnancy. Mom denies any breast surgeries, diabetes, thyroid or fertility issues. Mom breast fed her first child for 8 months without difficulty.   Reviewed normal  behavior and feeding patterns. Encouraged to do skin to skin during waking hours and feed when noting early feeding cues of licking lips, stirring in sleep and putting hand to mouth.   Discussed demand feed baby 8 or more times in 24 hours. Be aware that periods of cluster feeding are normal. Note rhythmic, effective jaw glide   Discussed observing for early feeding cues and starting a feed at that time, remain skin to skin if no immediate latch and call bedside RN for assist.  Mom may be discharged to day and has a pump at home.   Breastfeeding plan:    Feed baby with feeding cues and at least a minimum of 8x/24 hours.  Expect cluster feeding as this is normal during early days of life and growth spurts.  It is not recommended to let baby sleep longer than 4 hours between feedings and if sleepy, place skin to skin to promote feeding interest and milk production.  Baby's usually feed more frequently and longer when skin to skin with mother.   Make sure infant is getting enough by ensuring frequent feedings as well as looking for transitioning stools from dark meconium to bright yellow/green seedy loose stools by day 5.     Follow up with PEDS PCP as scheduled for weight checks and to make sure feeding is progressing appropriately.    Call for breastfeeding for 1:1 lactation consultation through  [facility] (see information sheet) as needed and attend the  [facility] without need for appointment.

## 2022-09-20 NOTE — PROGRESS NOTES
"Subjective:   Flaca Feldman a 29 y.o. female who presents for Sinus Problem (sinus pressure x5 days, facial swelling, tender to the touch)    Sinus Problem   This is a new problem. The current episode started in the past 7 days. The problem has been rapidly worsening since onset. There has been no fever. The pain is moderate. Associated symptoms include congestion and sinus pressure. Pertinent negatives include no coughing, headaches, shortness of breath or sore throat.     Review of Systems   HENT: Positive for congestion and sinus pressure. Negative for sore throat.    Respiratory: Negative for cough and shortness of breath.    Neurological: Negative for headaches.     Allergies   Allergen Reactions   • Iodine Hives and Rash         • Morphine Rash     \"Skin burny rash.\"      Objective:   /80 (BP Location: Right arm, Patient Position: Sitting, BP Cuff Size: Small adult)   Pulse (!) 110   Temp 37.7 °C (99.8 °F) (Temporal)   Resp 18   SpO2 97%   Physical Exam  Constitutional:       General: She is not in acute distress.     Appearance: She is well-developed.   HENT:      Head: Normocephalic and atraumatic.      Nose: Mucosal edema and rhinorrhea present.      Right Sinus: Maxillary sinus tenderness present.      Left Sinus: Maxillary sinus tenderness present.   Eyes:      Conjunctiva/sclera: Conjunctivae normal.      Pupils: Pupils are equal, round, and reactive to light.   Cardiovascular:      Rate and Rhythm: Normal rate and regular rhythm.      Heart sounds: No murmur.   Pulmonary:      Effort: Pulmonary effort is normal. No respiratory distress.      Breath sounds: Normal breath sounds.   Abdominal:      General: There is no distension.      Palpations: Abdomen is soft.      Tenderness: There is no tenderness.   Skin:     General: Skin is warm and dry.   Neurological:      Mental Status: She is alert and oriented to person, place, and time.      Sensory: No sensory deficit.      Deep " Tendon Reflexes: Reflexes are normal and symmetric.       Assessment/Plan:   Assessment    1. Acute non-recurrent maxillary sinusitis  - doxycycline (VIBRAMYCIN) 100 MG Tab; Take 1 Tab by mouth 2 times a day for 10 days.  Dispense: 20 Tab; Refill: 0  - MONONUCLEOSIS TEST QUAL; Future    2. Concern about STD in female without diagnosis  - HIV AG/AB COMBO ASSAY SCREENING; Future  - RPR (SYPHILIS); Future  - HEP A AB IGM; Future  - HEP A AB TOTAL; Future  - HEP B CORE AB IGM; Future  - HEP B CORE AB TOTAL; Future  - HEP B SURFACE ANTIGEN; Future  - HEP C VIRUS ANTIBODY; Future  - HSV I/II IGG & IGM SERUM; Future    PATIENT WAS TOLD SHE HAD TO FOLLOW UP WITH PCP FOR ALL BLOOD TEST RESULTS.    Differential diagnosis, natural history, supportive care, and indications for immediate follow-up discussed.  Return if symptoms worsen or fail to improve.     Topical Retinoid counseling:  Patient advised to apply a pea-sized amount only at bedtime and wait 30 minutes after washing their face before applying.  If too drying, patient may add a non-comedogenic moisturizer. The patient verbalized understanding of the proper use and possible adverse effects of retinoids.  All of the patient's questions and concerns were addressed.

## (undated) DEVICE — PACK ROOM TURNOVER L&D (12/CA)

## (undated) DEVICE — GLOVE BIOGEL SZ 8 SURGICAL PF LTX - (50PR/BX 4BX/CA)

## (undated) DEVICE — BLANKET UNDERBODY FULL ACCES - (5/CA)

## (undated) DEVICE — SET EXTENSION WITH 2 PORTS (48EA/CA) ***PART #2C8610 IS A SUBSTITUTE*****

## (undated) DEVICE — PENCIL ELECTSURG 10FT HLSTR - WITH BLADE (50EA/CA)

## (undated) DEVICE — SUTURE 2-0 VICRYL PLUS CT-1 36 (36PK/BX)"

## (undated) DEVICE — HEAD HOLDER JUNIOR/ADULT

## (undated) DEVICE — SOLUTION PLASMA-LYTE PH 7.4 INJ 1000ML  (14EA/CA)

## (undated) DEVICE — CANISTER SUCTION 3000ML MECHANICAL FILTER AUTO SHUTOFF MEDI-VAC NONSTERILE LF DISP  (40EA/CA)

## (undated) DEVICE — KIT  I.V. START (100EA/CA)

## (undated) DEVICE — BANDAGE TENSOPLAST 3 X 5 YDS - (1/EA)

## (undated) DEVICE — SODIUM CHL IRRIGATION 0.9% 1000ML (12EA/CA)

## (undated) DEVICE — WATER IRRIGATION STERILE 1000ML (12EA/CA)

## (undated) DEVICE — TRAY SPINAL ANESTHESIA NON-SAFETY (10/CA)

## (undated) DEVICE — PUMP MITYONE W/SOFT CUP (12EA/BX)

## (undated) DEVICE — CHLORAPREP 26 ML APPLICATOR - ORANGE TINT(25/CA)

## (undated) DEVICE — CLOSURE SKIN STRIP 1/4 X 3 IN - (STERI STRIP) (50/BX)

## (undated) DEVICE — CATHETER IV NON-SAFETY 18 GA X 1 1/4 (50/BX 4BX/CA)

## (undated) DEVICE — DRESSING POST OP BORDER 4 X 10 (5EA/BX)

## (undated) DEVICE — SLEEVE, SEQUENTIAL CALF REG

## (undated) DEVICE — TAPE CLOTH MEDIPORE 6 INCH - (12RL/CA)

## (undated) DEVICE — TUBING CLEARLINK DUO-VENT - C-FLO (48EA/CA)

## (undated) DEVICE — DRESSING ABDOMINAL PAD STERILE 8 X 10" (360EA/CA)"

## (undated) DEVICE — STERI STRIP COMPOUND BENZOIN - TINCTURE 0.6ML WITH APPLICATOR (40EA/BX)

## (undated) DEVICE — SUTURE 0 VICRYL PLUS CT-1 - 36 INCH (36/BX)

## (undated) DEVICE — CLOSURE SKIN STRIP 1/2 X 4 IN - (STERI STRIP) (50/BX 4BX/CA)

## (undated) DEVICE — SUTURE 1 CHROMIC CTX ETHICON - (36PK/BX)

## (undated) DEVICE — PACK C-SECTION (2EA/CA)

## (undated) DEVICE — SUTURE 4-0 VICRYL PLUSFS-1 - 27 INCH (36/BX)